# Patient Record
Sex: FEMALE | Race: WHITE | NOT HISPANIC OR LATINO | Employment: FULL TIME
[De-identification: names, ages, dates, MRNs, and addresses within clinical notes are randomized per-mention and may not be internally consistent; named-entity substitution may affect disease eponyms.]

---

## 2017-04-04 DIAGNOSIS — Z12.39 SCREENING FOR BREAST CANCER: Primary | ICD-10-CM

## 2017-07-01 ENCOUNTER — HEALTH MAINTENANCE LETTER (OUTPATIENT)
Age: 57
End: 2017-07-01

## 2017-10-24 ENCOUNTER — OFFICE VISIT (OUTPATIENT)
Dept: FAMILY MEDICINE | Facility: CLINIC | Age: 57
End: 2017-10-24

## 2017-10-24 VITALS
HEART RATE: 62 BPM | SYSTOLIC BLOOD PRESSURE: 140 MMHG | WEIGHT: 222 LBS | TEMPERATURE: 98.2 F | BODY MASS INDEX: 36.6 KG/M2 | DIASTOLIC BLOOD PRESSURE: 80 MMHG | OXYGEN SATURATION: 97 %

## 2017-10-24 DIAGNOSIS — R03.0 ELEVATED BLOOD PRESSURE READING WITHOUT DIAGNOSIS OF HYPERTENSION: ICD-10-CM

## 2017-10-24 DIAGNOSIS — R53.83 FATIGUE, UNSPECIFIED TYPE: Primary | ICD-10-CM

## 2017-10-24 DIAGNOSIS — Z12.31 ENCOUNTER FOR SCREENING MAMMOGRAM FOR BREAST CANCER: ICD-10-CM

## 2017-10-24 DIAGNOSIS — Z13.220 LIPID SCREENING: ICD-10-CM

## 2017-10-24 DIAGNOSIS — Z13.1 SCREENING FOR DIABETES MELLITUS: ICD-10-CM

## 2017-10-24 DIAGNOSIS — J06.9 VIRAL UPPER RESPIRATORY TRACT INFECTION: ICD-10-CM

## 2017-10-24 LAB
ANION GAP SERPL CALCULATED.3IONS-SCNC: 6 MMOL/L (ref 3–14)
BASOPHILS # BLD AUTO: 0 10E9/L (ref 0–0.2)
BASOPHILS NFR BLD AUTO: 0.5 %
BUN SERPL-MCNC: 17 MG/DL (ref 7–30)
CALCIUM SERPL-MCNC: 9.6 MG/DL (ref 8.5–10.1)
CHLORIDE SERPL-SCNC: 104 MMOL/L (ref 94–109)
CHOLEST SERPL-MCNC: 259 MG/DL
CO2 SERPL-SCNC: 28 MMOL/L (ref 20–32)
CREAT SERPL-MCNC: 0.9 MG/DL (ref 0.52–1.04)
DIFFERENTIAL METHOD BLD: ABNORMAL
EOSINOPHIL # BLD AUTO: 0.3 10E9/L (ref 0–0.7)
EOSINOPHIL NFR BLD AUTO: 3.4 %
ERYTHROCYTE [DISTWIDTH] IN BLOOD BY AUTOMATED COUNT: 14.5 % (ref 10–15)
GFR SERPL CREATININE-BSD FRML MDRD: 65 ML/MIN/1.7M2
GLUCOSE SERPL-MCNC: 97 MG/DL (ref 70–99)
HBA1C MFR BLD: 5.6 % (ref 4.1–5.7)
HCT VFR BLD AUTO: 43.4 % (ref 35–47)
HDLC SERPL-MCNC: 59 MG/DL
HGB BLD-MCNC: 14.2 G/DL (ref 11.7–15.7)
IMM GRANULOCYTES # BLD: 0 10E9/L (ref 0–0.4)
IMM GRANULOCYTES NFR BLD: 0.1 %
LDLC SERPL CALC-MCNC: 178 MG/DL
LYMPHOCYTES # BLD AUTO: 1.5 10E9/L (ref 0.8–5.3)
LYMPHOCYTES NFR BLD AUTO: 20.1 %
MCH RBC QN AUTO: 27.2 PG (ref 26.5–33)
MCHC RBC AUTO-ENTMCNC: 32.7 G/DL (ref 31.5–36.5)
MCV RBC AUTO: 83 FL (ref 78–100)
MONOCYTES # BLD AUTO: 0.4 10E9/L (ref 0–1.3)
MONOCYTES NFR BLD AUTO: 5.5 %
MONONUCLEOSIS SCREEN: NEGATIVE
NEUTROPHILS # BLD AUTO: 5.2 10E9/L (ref 1.6–8.3)
NEUTROPHILS NFR BLD AUTO: 70.4 %
NONHDLC SERPL-MCNC: 200 MG/DL
NRBC # BLD AUTO: 0 10*3/UL
NRBC BLD AUTO-RTO: 0 /100
PLATELET # BLD AUTO: 291 10E9/L (ref 150–450)
POTASSIUM SERPL-SCNC: 4.9 MMOL/L (ref 3.4–5.3)
RBC # BLD AUTO: 5.23 10E12/L (ref 3.8–5.2)
SODIUM SERPL-SCNC: 139 MMOL/L (ref 133–144)
TRIGL SERPL-MCNC: 110 MG/DL
TSH SERPL DL<=0.005 MIU/L-ACNC: 1.64 MU/L (ref 0.4–4)
WBC # BLD AUTO: 7.4 10E9/L (ref 4–11)

## 2017-10-24 ASSESSMENT — PAIN SCALES - GENERAL: PAINLEVEL: NO PAIN (0)

## 2017-10-24 NOTE — NURSING NOTE
Chief Complaint   Patient presents with     URI     Nasal Congestion     Breast Pain     left breast x 2 weeks, goes down under her arm     57 year old      Blood pressure 140/80, pulse 62, temperature 98.2  F (36.8  C), temperature source Oral, weight 222 lb (100.7 kg), last menstrual period 02/01/2015, SpO2 97 %, not currently breastfeeding. Body mass index is 36.6 kg/(m^2).    Wt Readings from Last 2 Encounters:   10/24/17 222 lb (100.7 kg)   03/29/16 194 lb (88 kg)     BP Readings from Last 3 Encounters:   10/24/17 140/80   03/29/16 127/87   05/13/15 128/80       Patient Active Problem List   Diagnosis     Allergic rhinitis       Current Outpatient Prescriptions   Medication Sig Dispense Refill     fexofenadine (ALLEGRA) 180 MG tablet Take 1 tablet by mouth daily.       Glucosamine-Chondroitin (GLUCOSAMINE CHONDR COMPLEX PO) Take 1 capsule by mouth 2 times daily.       Multiple Vitamin (MULTIVITAMIN) per tablet Take 1 tablet by mouth daily.         Social History   Substance Use Topics     Smoking status: Never Smoker     Smokeless tobacco: Never Used     Alcohol use No         Health Maintenance Due   Topic Date Due     HEPATITIS C SCREENING  04/28/1979     MAMMO SCREEN Q2 YR (SYSTEM ASSIGNED)  04/14/2016     ADVANCE DIRECTIVE PLANNING Q5 YRS  04/28/2016     PAP Q5 YEARS  06/19/2017     HPV Q5 YEARS (Complete with PAP)  06/19/2017     INFLUENZA VACCINE (SYSTEM ASSIGNED)  09/01/2017       JIGNESH QUINONEZ CMA  October 24, 2017 8:39 AM

## 2017-10-24 NOTE — PATIENT INSTRUCTIONS
Labs today to evaluate your recent infection and fatigue.    Your mono test is negative    Use flonase for allergy symptoms.  I do not think you have a sinus infection at this time and will recommend we avoid an antibiotic.    Please make an appointment for your physical.    Please let me know if you have any questions.  Thank you for allowing me to participate in your care.  It was my pleasure meeting you.  Nancy Kasper PA-C      Welcome to [facility], where we are committed to the art of inspired primary care.  Thank you for choosing us to be a part of your well-being.    The clinic is open Monday through Friday, 8:00 a.m. - 5:00 p.m., and Saturdays from 8:00 a.m. - 12:00 p.m.  After-hours questions are directed to our 24-hour nurse line, which can be reached by calling the clinic at [phone].  You can also contact the clinic through [employer], our online patient portal.  (Please allow 1-2 business days for a response via [employer].)  If you are not already enrolled in [employer], access instructions are below.    If you need a refill on your prescription, please contact the pharmacy where you filled it, and they will contact our clinic with the details of what is needed.  If your prescription is a controlled substance, you will have a conversation with your provider to determine if you would like to  your prescription at the clinic or have it mailed to your pharmacy.  Please allow 2-3 business days for all refill requests to be handled.    We look forward to providing you with great care!

## 2017-10-24 NOTE — MR AVS SNAPSHOT
After Visit Summary   10/24/2017    Yola Amin    MRN: [id number]           Patient Information     Date Of Birth          1960        Visit Information        Provider Department      10/24/2017 8:40 AM Falguni Kasper PA-C [facility]        Today's Diagnoses     Fatigue, unspecified type    -  1    Screening for diabetes mellitus        Lipid screening        Elevated blood pressure reading without diagnosis of hypertension        Encounter for screening mammogram for breast cancer          Care Instructions    Labs today to evaluate your recent infection and fatigue.    Your mono test is negative    Use flonase for allergy symptoms.  I do not think you have a sinus infection at this time and will recommend we avoid an antibiotic.    Please make an appointment for your physical.    Please let me know if you have any questions.  Thank you for allowing me to participate in your care.  It was my pleasure meeting you.  Nancy Kasper PA-C      Welcome to [facility], where we are committed to the art of inspired primary care.  Thank you for choosing us to be a part of your well-being.    The clinic is open Monday through Friday, 8:00 a.m. - 5:00 p.m., and Saturdays from 8:00 a.m. - 12:00 p.m.  After-hours questions are directed to our 24-hour nurse line, which can be reached by calling the clinic at [phone].  You can also contact the clinic through [email or web address], our online patient portal.  (Please allow 1-2 business days for a response via [email or web address].)  If you are not already enrolled in [email or web address], access instructions are below.    If you need a refill on your prescription, please contact the pharmacy where you filled it, and they will contact our clinic with the details of what is needed.  If your prescription is a controlled substance, you will have a conversation with your provider to determine if you would like to  your prescription at the  clinic or have it mailed to your pharmacy.  Please allow 2-3 business days for all refill requests to be handled.    We look forward to providing you with great care!          Follow-ups after your visit        Future tests that were ordered for you today     Open Future Orders        Priority Expected Expires Ordered    Mammogram - routine screening Routine  10/24/2018 10/24/2017            Who to contact     Please call your clinic at [phone] to:    Ask questions about your health    Make or cancel appointments    Discuss your medicines    Learn about your test results    Speak to your doctor   If you have compliments or concerns about an experience at your clinic, or if you wish to file a complaint, please contact [facility] Patient Relations at [phone] or email us at [email or web address]         Additional Information About Your Visit        [employer] Information     [employer] gives you secure access to your electronic health record. If you see a primary care provider, you can also send messages to your care team and make appointments. If you have questions, please call your primary care clinic.  If you do not have a primary care provider, please call [phone] and they will assist you.      [employer] is an electronic gateway that provides easy, online access to your medical records. With [employer], you can request a clinic appointment, read your test results, renew a prescription or communicate with your care team.     To access your existing account, please contact your [facility] or call [phone] for assistance.        Care EveryWhere ID     This is your Care EveryWhere ID. This could be used by other organizations to access your [facility] medical records  [id number]        Your Vitals Were     Pulse Temperature Last Period Pulse Oximetry BMI (Body Mass Index)       62 98.2  F (36.8  C) (Oral) 02/01/2015 97% 36.6 kg/m2        Blood  Pressure from Last 3 Encounters:   10/24/17 140/80   03/29/16 127/87   05/13/15 128/80    Weight from Last 3 Encounters:   10/24/17 222 lb (100.7 kg)   03/29/16 194 lb (88 kg)   05/13/15 196 lb 1.3 oz (88.9 kg)              We Performed the Following     Basic metabolic panel     CBC with platelets differential     Hemoglobin A1c ([place])     Mononucleosis Screen ([facility])     TSH with free T4 reflex     Vitamin D deficiency screening        Primary Care Provider Office Phone # Fax #    [facility] [phone]       [address], [address]  [place] [address]        Equal Access to Services     ALFRED WILLIS : Celsa Majano, fran parra, gale gallardo, jerod stein. So [facility] [phone].    ATENCIÓN: Si habla español, tiene a augustine disposición servicios gratuitos de asistencia lingüística. Llame al [phone].    We comply with applicable federal civil rights laws and [place] laws. We do not discriminate on the basis of race, color, national origin, age, disability, sex, sexual orientation, or gender identity.            Thank you!     Thank you for choosing [facility]  for your care. Our goal is always to provide you with excellent care. Hearing back from our patients is one way we can continue to improve our services. Please take a few minutes to complete the written survey that you may receive in the mail after your visit with us. Thank you!             Your Updated Medication List - Protect others around you: Learn how to safely use, store and throw away your medicines at www.disposemymeds.org.          This list is accurate as of: 10/24/17  9:30 AM.  Always use your most recent med list.                   Brand Name Dispense Instructions for use Diagnosis    fexofenadine 180 MG tablet    ALLEGRA     Take 1 tablet by mouth daily.        GLUCOSAMINE CHONDR COMPLEX PO      Take 1 capsule by mouth 2 times daily.         multivitamin per tablet      Take 1 tablet by mouth daily.

## 2017-10-24 NOTE — PROGRESS NOTES
SUBJECTIVE:   Yola Amin is a 57 year old female who presents to clinic today for the following health issues:    Acute Illness   Acute illness concerns: Cough cold and congestion started couple weeks at least.  Feels fatigued and is concerned about the possibility of mono.  Has not checked fever but has felt feverish.  Minimal sore throat.  Her sister currently has mono.  Has a history of allergies and they have been bothering for the same amount of time.  She denies facial pain and pressure.  No cough.     Fever: no     Chills/Sweats: YES    Headache (location?): YES    Sinus Pressure:YES    Conjunctivitis:  no    Ear Pain: no    Rhinorrhea: YES    Congestion: YES    Sore Throat: YES- minimal     Cough: no    Wheeze: no    Decreased Appetite: no    Nausea: no    Vomiting: no    Diarrhea:  no    Dysuria/Freq.: no    Fatigue/Achiness: YES    Sick/Strep Exposure: no     Therapies Tried and outcome: Taking allergy meds/antihistamine    Multiple other concerns:    Pain under the left upper arm that radiates into the left lateral chest and breast. Symptoms have been present for the past couple months. Started as just a fleeting  And now she notes it most of the time. She denies an associated history of trauma.    Fatigue: has been present for the past several months.  Patient admits to being under a great deal of stress with her work and her mom's illness and recent death.  She denies depression but has had issues with this in the past.She admits to neglecting her physical/health issues due to these stresses but now she is ready to work on them.  She is getting a normal amount of sleep and denies sleep disturbances.      Problem list and histories reviewed & adjusted, as indicated.  Additional history: as documented    Patient Active Problem List   Diagnosis     Allergic rhinitis     Elevated blood pressure reading without diagnosis of hypertension     Past Surgical History:   Procedure Laterality Date      CYSTECTOMY OVARIAN BENIGN      left     ENDOCERVICAL CURETTAGE         Social History   Substance Use Topics     Smoking status: Never Smoker     Smokeless tobacco: Never Used     Alcohol use No     Family History   Problem Relation Age of Onset     DIABETES Mother      Hypertension Mother      Rheumatologic Disease Mother      CANCER Maternal Grandmother      Liver Disease Maternal Grandmother      CANCER Paternal Grandmother          Current Outpatient Prescriptions   Medication Sig Dispense Refill     fexofenadine (ALLEGRA) 180 MG tablet Take 1 tablet by mouth daily.       Glucosamine-Chondroitin (GLUCOSAMINE CHONDR COMPLEX PO) Take 1 capsule by mouth 2 times daily.       Multiple Vitamin (MULTIVITAMIN) per tablet Take 1 tablet by mouth daily.       No Known Allergies      Reviewed and updated as needed this visit by clinical staffTobacco  Allergies  Meds  Problems       Reviewed and updated as needed this visit by Provider  Allergies  Meds  Problems         ROS:  Constitutional, HEENT, cardiovascular, pulmonary, gi and gu systems are negative, except as otherwise noted.      OBJECTIVE:   /80 (BP Location: Right arm, Patient Position: Sitting, Cuff Size: Adult Large)  Pulse 62  Temp 98.2  F (36.8  C) (Oral)  Wt 222 lb (100.7 kg)  LMP 02/01/2015  SpO2 97%  BMI 36.6 kg/m2  Body mass index is 36.6 kg/(m^2).  GENERAL: healthy, alert and no distress  EYES: Eyes grossly normal to inspection, PERRL and conjunctivae and sclerae normal  HENT: ear canals and TM's normal, nose and mouth without ulcers or lesions  NECK: no adenopathy, no asymmetry, masses, or scars and thyroid normal to palpation  RESP: lungs clear to auscultation - no rales, rhonchi or wheezes  BREAST: normal without masses, tenderness or nipple discharge and no palpable axillary masses or adenopathy  CV: regular rate and rhythm, normal S1 S2, no S3 or S4, no murmur, click or rub, no peripheral edema and peripheral pulses strong  ABDOMEN:  soft, nontender, no hepatosplenomegaly, no masses and bowel sounds normal  SKIN: no suspicious lesions or rashes  PSYCH: well dressed and groomed.  Good eye contact and is cooperative. Thoughts linear.  No delusions, compulsions or paranoia.  Affect flat.  Patient denies homicidal and suicidal ideation as well as no thoughts or actions of self-harm.  LYMPH: no cervical, supraclavicular, axillary, or inguinal adenopathy    Diagnostic Test Results:  Results for orders placed or performed in visit on 10/24/17 (from the past 24 hour(s))   Mononucleosis Screen ([facility])   Result Value Ref Range    Mononucleosis Screen NEGATIVE    Hemoglobin A1c ([place])   Result Value Ref Range    Hemoglobin A1C 5.6 4.1 - 5.7 %       ASSESSMENT/PLAN:      Diagnosis Comments   1. Fatigue, unspecified type  CBC with platelets differential, TSH with free T4 reflex, Vitamin D deficiency screening, Mononucleosis Screen ([facility])   Unclear etiology.  Suspect post viral   2. Viral upper respiratory tract infection  Continue with symptomatic treatment.  Avoid antibiotics   3. Elevated blood pressure reading without diagnosis of hypertension  Hemoglobin A1c ([place]), Basic metabolic panel   Make appointment for CPE and recheck   4. Screening for diabetes mellitus  Hemoglobin A1c ([place])    5. Lipid screening  Lipid test   6. Encounter for screening mammogram for breast cancer  Mammogram - routine screening      See patient instructions.  Consider CPE in the near future.  Discussed causes of fatigue.  Consider taking vitamin D daily.      Patient Instructions   Labs today to evaluate your recent infection and fatigue.    Your mono test is negative    Use flonase for allergy symptoms.  I do not think you have a sinus infection at this time and will recommend we avoid an antibiotic.    Please make an appointment for your physical.        Falguni Kasper PA-C  [facility]

## 2017-10-25 LAB — DEPRECATED CALCIDIOL+CALCIFEROL SERPL-MC: 36 UG/L (ref 20–75)

## 2017-10-28 ENCOUNTER — HEALTH MAINTENANCE LETTER (OUTPATIENT)
Age: 57
End: 2017-10-28

## 2017-12-11 NOTE — PROGRESS NOTES
SUBJECTIVE:   CC: Yola Amin is an 57 year old woman who presents for preventive health visit. Her last CPE was 2015 with JAMES Trejo CNP. She has the following concerns:  None.  Overall doing well.  She has been exercising and lost 6 pounds since her last visit. Heart healthy diet.  We will plan to recheck lipids in 6 months.  Future orders placed.    GYN history:  Currently sexually active: yes with wife    Contraception: Not needed/menopausal.  Patient's last menstrual period was 2015.  Vaginal symptoms: None  Concern for STD: no    Accepts/requests STD testing: declines  Last PAP and HPV 2012    Breast concerns:  Back and shoulder pain.  Has been seeing chiropractor for this and is concerned it may be related to her breast size and she would like to talk to someone about breast reduction.    She describes skin irritation and yeast infection under her breasts. No rash currently.  Has tried multiple kinds of bras.  Two sisters have had breast reduction.      Healthy Habits:    Do you get at least three servings of calcium containing foods daily (dairy, green leafy vegetables, etc.)? yes    Amount of exercise or daily activities, outside of work: 45 minutes on the treadmill 5 days a week    Problems taking medications regularly No    Medication side effects: No    Have you had an eye exam in the past two years? yes    Do you see a dentist twice per year? yes    Do you have sleep apnea, excessive snoring or daytime drowsiness?no        Hyperlipidemia Follow-Up    Recent Labs   Lab Test  10/24/17   0923  05/13/15   1037  14   1111   CHOL  259*  199  206.0*   HDL  59  73  62.0   LDL  178*  114  122.0   TRIG  110  61  112.0   CHOLHDLRATIO   --   2.7  3.3     The 10-year ASCVD risk score ([place] THEODORA Jr, et al., 2013) is: 2.8%    Values used to calculate the score:      Age: 57 years      Sex: Female      Is Non- : No      Diabetic: No      Tobacco smoker: No       Systolic Blood Pressure: 132 mmHg      Is BP treated: No      HDL Cholesterol: 59 mg/dL      Total Cholesterol: 259 mg/dL  BP Readings from Last 6 Encounters:   12/21/17 132/86   10/24/17 140/80   03/29/16 127/87   05/13/15 128/80   04/01/14 118/81   05/15/13 126/86     Wt Readings from Last 2 Encounters:   12/21/17 216 lb 4 oz (98.1 kg)   10/24/17 222 lb (100.7 kg)       Rate your low fat/cholesterol diet?: good    Taking statin?  No    Other lipid medications/supplements?:  Fish oil/Omega 3,  without side effects      Today's PHQ-2 Score:   PHQ-2 ( 1999 Pfizer) 12/21/2017 3/29/2016   Q1: Little interest or pleasure in doing things 0 0   Q2: Feeling down, depressed or hopeless 0 0   PHQ-2 Score 0 0       Patient Active Problem List    Diagnosis Date Noted     Elevated blood pressure reading without diagnosis of hypertension 10/24/2017     Priority: Medium     Allergic rhinitis 08/17/2012     Priority: Medium     Problem list name updated by automated process. Provider to review         History reviewed. No pertinent past medical history.    Past Surgical History:   Procedure Laterality Date     CYSTECTOMY OVARIAN BENIGN      left     ENDOCERVICAL CURETTAGE         Family History   Problem Relation Age of Onset     DIABETES Mother      Hypertension Mother      Rheumatologic Disease Mother      CANCER Maternal Grandmother      Liver Disease Maternal Grandmother      CANCER Paternal Grandmother        Social History   Substance Use Topics     Smoking status: Never Smoker     Smokeless tobacco: Never Used     Alcohol use 1.8 oz/week     3 Standard drinks or equivalent per week       Social History     Social History Narrative    Work is going well.  Lives with wife.  Two cats.        Has a good support system.    Feels safe in all environments.    Wears seatbelt 100% of the time    Wears helmet while biking.    Denies history of abuse, past or present, physical, sexual or emotional.    12/21/17    Nancy Kasper PA-C                Current Outpatient Prescriptions   Medication Sig Dispense Refill     fexofenadine (ALLEGRA) 180 MG tablet Take 1 tablet by mouth daily.       Glucosamine-Chondroitin (GLUCOSAMINE CHONDR COMPLEX PO) Take 1 capsule by mouth 2 times daily.       Multiple Vitamin (MULTIVITAMIN) per tablet Take 1 tablet by mouth daily.           Reviewed orders with patient.  Reviewed health maintenance and updated orders accordingly - Yes  BP Readings from Last 3 Encounters:   12/21/17 132/86   10/24/17 140/80   03/29/16 127/87    Wt Readings from Last 3 Encounters:   12/21/17 216 lb 4 oz (98.1 kg)   10/24/17 222 lb (100.7 kg)   03/29/16 194 lb (88 kg)                      Patient over age 50, mutual decision to screen reflected in health maintenance.      Pertinent mammograms are reviewed under the imaging tab.  History of abnormal Pap smear: NO - age 30-65 PAP every 5 years with negative HPV co-testing recommended    Reviewed and updated as needed this visit by clinical staffTobacco  Allergies  Meds  Med Hx  Surg Hx  Fam Hx  Soc Hx        Reviewed and updated as needed this visit by Provider  Tobacco  Med Hx  Surg Hx  Fam Hx  Soc Hx         ROS:  C: NEGATIVE for fever, chills, change in weight  I: NEGATIVE for worrisome rashes, moles or lesions  E: NEGATIVE for vision changes or irritation  ENT: NEGATIVE for ear, mouth and throat problems  R: NEGATIVE for significant cough or SOB  B: NEGATIVE for masses, tenderness or discharge  CV: NEGATIVE for chest pain, palpitations or peripheral edema  GI: NEGATIVE for nausea, abdominal pain, heartburn, or change in bowel habits  : NEGATIVE for unusual urinary or vaginal symptoms. No vaginal bleeding.  M: NEGATIVE for significant arthralgias or myalgia  N: NEGATIVE for weakness, dizziness or paresthesias  E: NEGATIVE for temperature intolerance, skin/hair changes  H: NEGATIVE for bleeding problems  P: NEGATIVE for changes in mood or affect     OBJECTIVE:   /86  Pulse  "66  Temp 97.5  F (36.4  C) (Oral)  Ht 5' 6.69\" (169.4 cm)  Wt 216 lb 4 oz (98.1 kg)  LMP 02/01/2015  SpO2 96%  BMI 34.18 kg/m2  EXAM:  GENERAL: healthy, alert and no distress  EYES: Eyes grossly normal to inspection, PERRL and conjunctivae and sclerae normal  HENT: ear canals and TM's normal, nose and mouth without ulcers or lesions  NECK: no adenopathy, no asymmetry, masses, or scars and thyroid normal to palpation  RESP: lungs clear to auscultation - no rales, rhonchi or wheezes  BREAST: normal without masses, tenderness or nipple discharge and no palpable axillary masses or adenopathy  CV: regular rate and rhythm, normal S1 S2, no S3 or S4, no murmur, click or rub, no peripheral edema and peripheral pulses strong  ABDOMEN: soft, nontender, no hepatosplenomegaly, no masses and bowel sounds normal   (female): normal female external genitalia, normal urethral meatus, vaginal mucosa pink, moist, well rugated, and normal cervix/adnexa/uterus without masses or discharge  MS: no gross musculoskeletal defects noted, no edema  SKIN: no suspicious lesions or rashes  NEURO: Normal strength and tone, mentation intact and speech normal  PSYCH: well dressed and groomed.  Good eye contact and is cooperative. Thoughts linear.  No delusions, compulsions or paranoia.  Affect flat.  Patient denies homicidal and suicidal ideation as well as no thoughts or actions of self-harm.  LYMPH: no cervical, supraclavicular, axillary, or inguinal adenopathy    ASSESSMENT/PLAN:       ICD-10-CM    1. Routine general medical examination at a health care facility Z00.00 Pap imaged thin layer screen with HPV - recommended age 30 - 65     HPV High Risk Types DNA Cervical     Hepatitis C antibody   2. Need for hepatitis C screening test Z11.59 Hepatitis C antibody   3. Cervical cancer screening Z12.4 Pap imaged thin layer screen with HPV - recommended age 30 - 65     HPV High Risk Types DNA Cervical   4. Hyperlipidemia LDL goal <160 E78.5 " "Lipid Panel Plus ([place])   5. Pendulous breast N64.89 BREAST CENTER REFERRAL   6. Chronic neck and back pain M54.2 BREAST CENTER REFERRAL    M54.9        COUNSELING:   Reviewed preventive health counseling, as reflected in patient instructions  Special attention given to:        Regular exercise       Healthy diet/nutrition       Vision screening       Hearing screening       Immunizations    Reviewed and are up to date.         Osteoporosis Prevention/Bone Health       Colon cancer screening       Consider Hep C screening for patients born between 1945 and 1965       ([place])menopause management       Advance Care Planning     reports that she has never smoked. She has never used smokeless tobacco.    Estimated body mass index is 34.18 kg/(m^2) as calculated from the following:    Height as of this encounter: 5' 6.69\" (169.4 cm).    Weight as of this encounter: 216 lb 4 oz (98.1 kg).   Weight management plan: Discussed healthy diet and exercise guidelines and patient will follow up in 12 months in clinic to re-evaluate.    Counseling Resources:  ATP IV Guidelines  Pooled Cohorts Equation Calculator  Breast Cancer Risk Calculator  FRAX Risk Assessment  ICSI Preventive Guidelines  Dietary Guidelines for Americans, 2010  USDA's MyPlate  ASA Prophylaxis  Lung CA Screening    Falguni Kasper PA-C  [facility]  "

## 2017-12-21 ENCOUNTER — OFFICE VISIT (OUTPATIENT)
Dept: FAMILY MEDICINE | Facility: CLINIC | Age: 57
End: 2017-12-21
Payer: COMMERCIAL

## 2017-12-21 VITALS
DIASTOLIC BLOOD PRESSURE: 86 MMHG | HEART RATE: 66 BPM | SYSTOLIC BLOOD PRESSURE: 132 MMHG | BODY MASS INDEX: 33.94 KG/M2 | OXYGEN SATURATION: 96 % | TEMPERATURE: 97.5 F | HEIGHT: 67 IN | WEIGHT: 216.25 LBS

## 2017-12-21 DIAGNOSIS — E78.5 HYPERLIPIDEMIA LDL GOAL <160: ICD-10-CM

## 2017-12-21 DIAGNOSIS — G89.29 CHRONIC NECK AND BACK PAIN: ICD-10-CM

## 2017-12-21 DIAGNOSIS — Z12.4 CERVICAL CANCER SCREENING: ICD-10-CM

## 2017-12-21 DIAGNOSIS — N64.89 PENDULOUS BREAST: ICD-10-CM

## 2017-12-21 DIAGNOSIS — M54.2 CHRONIC NECK AND BACK PAIN: ICD-10-CM

## 2017-12-21 DIAGNOSIS — Z11.59 NEED FOR HEPATITIS C SCREENING TEST: ICD-10-CM

## 2017-12-21 DIAGNOSIS — Z00.00 ROUTINE GENERAL MEDICAL EXAMINATION AT A HEALTH CARE FACILITY: Primary | ICD-10-CM

## 2017-12-21 DIAGNOSIS — M54.9 CHRONIC NECK AND BACK PAIN: ICD-10-CM

## 2017-12-21 NOTE — MR AVS SNAPSHOT
After Visit Summary   12/21/2017    Yola Amin    MRN: [id number]           Patient Information     Date Of Birth          1960        Visit Information        Provider Department      12/21/2017 10:00 AM Falguni Kasper PA-C [facility]        Today's Diagnoses     Routine general medical examination at a health care facility    -  1    Need for hepatitis C screening test        Cervical cancer screening        Hyperlipidemia LDL goal <160          Care Instructions      Preventive Health Recommendations  Female Ages 50 - 64    Yearly exam: See your health care provider every year in order to  o Review health changes.   o Discuss preventive care.    o Review your medicines if your doctor has prescribed any.      Get a Pap test every three years (unless you have an abnormal result and your provider advises testing more often).    If you get Pap tests with HPV test, you only need to test every 5 years, unless you have an abnormal result.     You do not need a Pap test if your uterus was removed (hysterectomy) and you have not had cancer.    You should be tested each year for STDs (sexually transmitted diseases) if you're at risk.     Have a mammogram every 1 to 2 years.    Have a colonoscopy at age 50, or have a yearly FIT test (stool test). These exams screen for colon cancer.      Have a cholesterol test every 5 years, or more often if advised.    Have a diabetes test (fasting glucose) every three years. If you are at risk for diabetes, you should have this test more often.     If you are at risk for osteoporosis (brittle bone disease), think about having a bone density scan (DEXA).    Shots: Get a flu shot each year. Get a tetanus shot every 10 years.    Nutrition:     Eat at least 5 servings of fruits and vegetables each day.    Eat whole-grain bread, whole-wheat pasta and brown rice instead of white grains and rice.    Talk to your provider about Calcium and Vitamin D.      Lifestyle    Exercise at least 150 minutes a week (30 minutes a day, 5 days a week). This will help you control your weight and prevent disease.    Limit alcohol to one drink per day.    No smoking.     Wear sunscreen to prevent skin cancer.     See your dentist every six months for an exam and cleaning.    See your eye doctor every 1 to 2 years.            Follow-ups after your visit        Future tests that were ordered for you today     Open Future Orders        Priority Expected Expires Ordered    Hepatitis C antibody Routine 6/21/2018 12/21/2018 12/21/2017    Lipid Panel Plus ([place]) Routine 6/21/2018 12/21/2018 12/21/2017            Who to contact     Please call your clinic at [phone] to:    Ask questions about your health    Make or cancel appointments    Discuss your medicines    Learn about your test results    Speak to your doctor   If you have compliments or concerns about an experience at your clinic, or if you wish to file a complaint, please contact [facility] Patient Relations at [phone] or email us at [email or web address]         Additional Information About Your Visit        Catacelhart Information     [employer] gives you secure access to your electronic health record. If you see a primary care provider, you can also send messages to your care team and make appointments. If you have questions, please call your primary care clinic.  If you do not have a primary care provider, please call [phone] and they will assist you.      [employer] is an electronic gateway that provides easy, online access to your medical records. With [employer], you can request a clinic appointment, read your test results, renew a prescription or communicate with your care team.     To access your existing account, please contact your [facility] or call [phone] for assistance.        Care EveryWhere ID     This is your Care EveryWhere ID.  "This could be used by other organizations to access your [facility] medical records  [id number]        Your Vitals Were     Pulse Temperature Height Last Period Pulse Oximetry BMI (Body Mass Index)    66 97.5  F (36.4  C) (Oral) 5' 6.69\" (169.4 cm) 02/01/2015 96% 34.18 kg/m2       Blood Pressure from Last 3 Encounters:   12/21/17 132/86   10/24/17 140/80   03/29/16 127/87    Weight from Last 3 Encounters:   12/21/17 216 lb 4 oz (98.1 kg)   10/24/17 222 lb (100.7 kg)   03/29/16 194 lb (88 kg)              We Performed the Following     HPV High Risk Types DNA Cervical     Pap imaged thin layer screen with HPV - recommended age 30 - 65        Primary Care Provider Office Phone # Fax #    Falguni Kasper PA-C [phone]       0 [address] [place] [address]        Equal Access to Services     ALFRED WILLIS : Hadii aad ku hadasho Soomaali, waaxda luqadaha, qaybta kaalmada adeegyada, waxay lylain rere ren . So [facility] [phone].    ATENCIÓN: Si habla español, tiene a augustine disposición servicios gratuitos de asistencia lingüística. [facility] [phone].    We comply with applicable federal civil rights laws and [place] laws. We do not discriminate on the basis of race, color, national origin, age, disability, sex, sexual orientation, or gender identity.            Thank you!     Thank you for choosing [facility]  for your care. Our goal is always to provide you with excellent care. Hearing back from our patients is one way we can continue to improve our services. Please take a few minutes to complete the written survey that you may receive in the mail after your visit with us. Thank you!             Your Updated Medication List - Protect others around you: Learn how to safely use, store and throw away your medicines at www.disposemymeds.org.          This list is accurate as of: 12/21/17 10:57 AM.  Always use your most recent med list.                   Brand Name " Dispense Instructions for use Diagnosis    fexofenadine 180 MG tablet    ALLEGRA     Take 1 tablet by mouth daily.        GLUCOSAMINE CHONDR COMPLEX PO      Take 1 capsule by mouth 2 times daily.        multivitamin per tablet      Take 1 tablet by mouth daily.

## 2017-12-21 NOTE — NURSING NOTE
"57 year old  Chief Complaint   Patient presents with     Physical     with pap test     Blood Draw     hep C testing     Patient Request     pt wants to discuss the possibility of having a breast reduction       Blood pressure 132/86, pulse 66, temperature 97.5  F (36.4  C), temperature source Oral, height 5' 6.69\" (169.4 cm), weight 216 lb 4 oz (98.1 kg), last menstrual period 02/01/2015, SpO2 96 %, not currently breastfeeding. Body mass index is 34.18 kg/(m^2).  Patient Active Problem List   Diagnosis     Allergic rhinitis     Elevated blood pressure reading without diagnosis of hypertension       Wt Readings from Last 2 Encounters:   12/21/17 216 lb 4 oz (98.1 kg)   10/24/17 222 lb (100.7 kg)     BP Readings from Last 3 Encounters:   12/21/17 132/86   10/24/17 140/80   03/29/16 127/87         Current Outpatient Prescriptions   Medication     fexofenadine (ALLEGRA) 180 MG tablet     Glucosamine-Chondroitin (GLUCOSAMINE CHONDR COMPLEX PO)     Multiple Vitamin (MULTIVITAMIN) per tablet     No current facility-administered medications for this visit.        Social History   Substance Use Topics     Smoking status: Never Smoker     Smokeless tobacco: Never Used     Alcohol use No       Health Maintenance Due   Topic Date Due     HEPATITIS C SCREENING  04/28/1979     ADVANCE DIRECTIVE PLANNING Q5 YRS  04/28/2016     PAP Q5 YEARS  06/19/2017     HPV Q5 YEARS (Complete with PAP)  06/19/2017       Lab Results   Component Value Date    PAP NIL 06/19/2012 December 21, 2017 10:00 AM  "

## 2017-12-26 LAB
COPATH REPORT: NORMAL
PAP: NORMAL

## 2017-12-27 LAB
FINAL DIAGNOSIS: NORMAL
HPV HR 12 DNA CVX QL NAA+PROBE: NEGATIVE
HPV16 DNA SPEC QL NAA+PROBE: NEGATIVE
HPV18 DNA SPEC QL NAA+PROBE: NEGATIVE
SPECIMEN DESCRIPTION: NORMAL

## 2018-02-03 ASSESSMENT — ENCOUNTER SYMPTOMS
BREAST MASS: 0
JOINT SWELLING: 0
MYALGIAS: 1
NECK PAIN: 1
ARTHRALGIAS: 0
MUSCLE CRAMPS: 0
STIFFNESS: 0
BREAST PAIN: 1
BACK PAIN: 1
MUSCLE WEAKNESS: 0

## 2018-02-06 ENCOUNTER — OFFICE VISIT (OUTPATIENT)
Dept: PLASTIC SURGERY | Facility: CLINIC | Age: 58
End: 2018-02-06
Attending: PLASTIC SURGERY
Payer: COMMERCIAL

## 2018-02-06 VITALS
SYSTOLIC BLOOD PRESSURE: 123 MMHG | WEIGHT: 209.9 LBS | BODY MASS INDEX: 33.73 KG/M2 | HEIGHT: 66 IN | OXYGEN SATURATION: 96 % | TEMPERATURE: 97.4 F | DIASTOLIC BLOOD PRESSURE: 81 MMHG | HEART RATE: 71 BPM

## 2018-02-06 DIAGNOSIS — M54.9 CHRONIC NECK AND BACK PAIN: ICD-10-CM

## 2018-02-06 DIAGNOSIS — G89.29 CHRONIC NECK AND BACK PAIN: ICD-10-CM

## 2018-02-06 DIAGNOSIS — N62 HYPERTROPHY OF BREAST: Primary | ICD-10-CM

## 2018-02-06 DIAGNOSIS — M54.2 CHRONIC NECK AND BACK PAIN: ICD-10-CM

## 2018-02-06 DIAGNOSIS — N64.89 PENDULOUS BREAST: ICD-10-CM

## 2018-02-06 PROCEDURE — G0463 HOSPITAL OUTPT CLINIC VISIT: HCPCS | Mod: ZF

## 2018-02-06 ASSESSMENT — PAIN SCALES - GENERAL: PAINLEVEL: MILD PAIN (3)

## 2018-02-06 NOTE — LETTER
2/6/2018       RE: Yola Amin  [address]    [facility] [address]     Dear Colleague,    Thank you for referring your patient, Yola Amin, to the [facility] at [facility]. Please see a copy of my visit note below.    REQUESTING PHYSICIAN:  Falguni Kasper PA-C.       PRESENTING COMPLAINT:  Consultation for breast reduction.      HISTORY OF PRESENTING COMPLAINT:  Ms. Amin is 57 years old.  She is here to discuss possible breast reduction surgery.  She has a lot of upper back, neck and shoulder pain, shoulder grooving from the bra strap and inframammary fold rashes in the summers.  She sees a massage therapist, a chiropractor and has been taking over-the-counter pain medication many years without continued relief.  She wears a DD to DDD bra.  She would like to be around a B cup.  She had a mammogram in 10/2017 that was benign, BI-RADS 2.  She has no personal history of breast cancer and no family history of breast cancer.      PAST MEDICAL HISTORY:  Nil.      PAST SURGICAL HISTORY:  Endocervical curettage and benign cyst removal from the ovary left side.      MEDICATIONS:  None.      ALLERGIES:  None.      SOCIAL HISTORY:  Does not smoke, socially drinks.  Works as a psychologist.      REVIEW OF SYSTEMS:  Denies chest pain, shortness of breath, MI, CVA, DVT and PE.      PHYSICAL EXAMINATION:  Vital signs are stable.  She is afebrile, in no obvious distress.  She is 5 feet 6-1/2 inches, 216 pounds, BMI of 34 kg/m2.  Body surface area 2.05 m2.  On examination of her breasts, she has grade 2 on 3 ptosis.  Left breast is about 5% larger than the right side.  Sternal notch to nipple distance under 40 cm.  No palpable masses, no axillary or cervical lymphadenopathy.      ASSESSMENT AND PLAN:  Based on the above findings, a diagnosis of symptomatic bilateral breast hypertrophy was made.  I had a long discussion with the patient and her  significant other about breast reduction surgery.  Explained to them what that would entail and showed them where the scars would be.  I was very clear that based on her Schnur scale, 687 grams needs to be removed from each breast and that if I took that amount off she would be at least 90% smaller than she currently is which means she would be disproportionately small for her body habitus and may require a free nipple graft.  She was okay with that.  Plan is to get prior authorization and then proceed as indicated.  Went over all the risks, benefits and alternatives including pain, infection, bleeding, scarring, asymmetry, seromas, hematomas, wound breakdown, wound dehiscence, loss of sensation in the nipple and the breast, prominent scar, hypertrophic scar, keloid scar, asymmetry of the breasts, seromas, hematomas, fat necrosis, skin necrosis, T-junction site necrosis, DVT, PE, MI, CVA, pneumonia, renal failure and death.  Consent was obtained and photographs obtained.  All exam was done in the presence of my nurse.  She was happy with the visit.  I look forward to helping her out in the near future.      Total time spent with the patient was 30 minutes, more than half was counseling.        Again, thank you for allowing me to participate in the care of your patient.      Sincerely,    AKIRA Vizcarra MD    cc:   Falguni Kasper PA-C   [facility]   [address] [place] [address]  [id number]

## 2018-02-06 NOTE — NURSING NOTE
"Oncology Rooming Note    February 6, 2018 7:40 AM   Yola Amin is a 57 year old female who presents for:    Chief Complaint   Patient presents with     Oncology Clinic Visit     New Patient-Breast Reduction     Initial Vitals: /81 (BP Location: Right arm, Patient Position: Sitting, Cuff Size: Adult Large)  Pulse 71  Temp 97.4  F (36.3  C) (Oral)  Ht 1.676 m (5' 6\")  Wt 95.2 kg (209 lb 14.4 oz)  LMP 02/01/2015  SpO2 96%  BMI 33.88 kg/m2 Estimated body mass index is 33.88 kg/(m^2) as calculated from the following:    Height as of this encounter: 1.676 m (5' 6\").    Weight as of this encounter: 95.2 kg (209 lb 14.4 oz). Body surface area is 2.11 meters squared.  Mild Pain (3) Comment: Both Shoulder-Left Worse   Patient's last menstrual period was 02/01/2015.  Allergies reviewed: Yes  Medications reviewed: Yes    Medications: Medication refills not needed today.  Pharmacy name entered into [employer]: [id number] - [address]    Clinical concerns: Questions Dr. Vizcarra was notified.    10 minutes for nursing intake (face to face time)     Denise Samayoa LPN              "

## 2018-02-06 NOTE — PROGRESS NOTES
REQUESTING PHYSICIAN:  Falguni Kasper PA-C.       PRESENTING COMPLAINT:  Consultation for breast reduction.      HISTORY OF PRESENTING COMPLAINT:  Ms. Amin is 57 years old.  She is here to discuss possible breast reduction surgery.  She has a lot of upper back, neck and shoulder pain, shoulder grooving from the bra strap and inframammary fold rashes in the summers.  She sees a massage therapist, a chiropractor and has been taking over-the-counter pain medication many years without continued relief.  She wears a DD to DDD bra.  She would like to be around a B cup.  She had a mammogram in 10/2017 that was benign, BI-RADS 2.  She has no personal history of breast cancer and no family history of breast cancer.      PAST MEDICAL HISTORY:  Nil.      PAST SURGICAL HISTORY:  Endocervical curettage and benign cyst removal from the ovary left side.      MEDICATIONS:  None.      ALLERGIES:  None.      SOCIAL HISTORY:  Does not smoke, socially drinks.  Works as a psychologist.      REVIEW OF SYSTEMS:  Denies chest pain, shortness of breath, MI, CVA, DVT and PE.      PHYSICAL EXAMINATION:  Vital signs are stable.  She is afebrile, in no obvious distress.  She is 5 feet 6-1/2 inches, 216 pounds, BMI of 34 kg/m2.  Body surface area 2.05 m2.  On examination of her breasts, she has grade 2 on 3 ptosis.  Left breast is about 5% larger than the right side.  Sternal notch to nipple distance under 40 cm.  No palpable masses, no axillary or cervical lymphadenopathy.      ASSESSMENT AND PLAN:  Based on the above findings, a diagnosis of symptomatic bilateral breast hypertrophy was made.  I had a long discussion with the patient and her significant other about breast reduction surgery.  Explained to them what that would entail and showed them where the scars would be.  I was very clear that based on her Schnur scale, 687 grams needs to be removed from each breast and that if I took that amount off she would be at least 90% smaller  than she currently is which means she would be disproportionately small for her body habitus and may require a free nipple graft.  She was okay with that.  Plan is to get prior authorization and then proceed as indicated.  Went over all the risks, benefits and alternatives including pain, infection, bleeding, scarring, asymmetry, seromas, hematomas, wound breakdown, wound dehiscence, loss of sensation in the nipple and the breast, prominent scar, hypertrophic scar, keloid scar, asymmetry of the breasts, seromas, hematomas, fat necrosis, skin necrosis, T-junction site necrosis, DVT, PE, MI, CVA, pneumonia, renal failure and death.  Consent was obtained and photographs obtained.  All exam was done in the presence of my nurse.  She was happy with the visit.  I look forward to helping her out in the near future.      Total time spent with the patient was 30 minutes, more than half was counseling.      cc:   Falguni Kasper PA-C   [facility]   [address] [facility], [place]  [address]

## 2018-02-06 NOTE — MR AVS SNAPSHOT
"              After Visit Summary   2/6/2018    Yola Amin    MRN: [id number]           Patient Information     Date Of Birth          1960        Visit Information        Provider Department      2/6/2018 8:00 AM AKIRA Vizcarra MD [facility]        Today's Diagnoses     Hypertrophy of breast    -  1    Pendulous breast        Chronic neck and back pain           Follow-ups after your visit        Follow-up notes from your care team     Return if symptoms worsen or fail to improve.      Who to contact     If you have questions or need follow up information about today's clinic visit or your schedule please contact [facility] directly at [phone].  Normal or non-critical lab and imaging results will be communicated to you by MyChart, letter or phone within 4 business days after the clinic has received the results. If you do not hear from us within 7 days, please contact the clinic through [employer] or phone. If you have a critical or abnormal lab result, we will notify you by phone as soon as possible.  Submit refill requests through [employer] or call your pharmacy and they will forward the refill request to us. Please allow 3 business days for your refill to be completed.          Additional Information About Your Visit        MyChart Information     [employer] gives you secure access to your electronic health record. If you see a primary care provider, you can also send messages to your care team and make appointments. If you have questions, please call your primary care clinic.  If you do not have a primary care provider, please call [phone] and they will assist you.        Care EveryWhere ID     This is your Care EveryWhere ID. This could be used by other organizations to access your [facility] medical records  [id number]        Your Vitals Were     Pulse Temperature Height Last Period Pulse Oximetry BMI (Body Mass Index)    71 97.4  F (36.3  C) (Oral) 5' 6\" 02/01/2015 " 96% 33.88 kg/m2       Blood Pressure from Last 3 Encounters:   02/06/18 123/81   12/21/17 132/86   10/24/17 140/80    Weight from Last 3 Encounters:   02/06/18 209 lb 14.4 oz   12/21/17 216 lb 4 oz   10/24/17 222 lb              Today, you had the following     No orders found for display       Primary Care Provider Office Phone # Fax #    Falguni Kasper PA-C [phone]       [address] [place] [address]        Equal Access to Services     [facility]: Hadii tae bruce hadasho Sorebecca, waaxda luqadaha, qaybta kaalmada sami, jerod ren . So [facility] [phone].    ATENCIÓN: Si habla español, tiene a augustine disposición servicios gratuitos de asistencia lingüística. [facility] [phone].    We comply with applicable federal civil rights laws and [place] laws. We do not discriminate on the basis of race, color, national origin, age, disability, sex, sexual orientation, or gender identity.            Thank you!     Thank you for choosing [facility]  for your care. Our goal is always to provide you with excellent care. Hearing back from our patients is one way we can continue to improve our services. Please take a few minutes to complete the written survey that you may receive in the mail after your visit with us. Thank you!             Your Updated Medication List - Protect others around you: Learn how to safely use, store and throw away your medicines at www.disposemymeds.org.          This list is accurate as of 2/6/18 11:02 AM.  Always use your most recent med list.                   Brand Name Dispense Instructions for use Diagnosis    fexofenadine 180 MG tablet    ALLEGRA     Take 1 tablet by mouth daily.        GLUCOSAMINE CHONDR COMPLEX PO      Take 1 capsule by mouth 2 times daily.        IBUPROFEN PO      Take 200 mg by mouth every 4 hours as needed for moderate pain        multivitamin per tablet      Take 1 tablet by mouth daily.

## 2018-02-12 ENCOUNTER — TELEPHONE (OUTPATIENT)
Dept: SURGERY | Facility: CLINIC | Age: 58
End: 2018-02-12

## 2018-02-12 DIAGNOSIS — N62 HYPERTROPHY OF BREAST: Primary | ICD-10-CM

## 2018-02-14 ENCOUNTER — TELEPHONE (OUTPATIENT)
Dept: SURGERY | Facility: CLINIC | Age: 58
End: 2018-02-14

## 2018-02-22 ENCOUNTER — TELEPHONE (OUTPATIENT)
Dept: SURGERY | Facility: CLINIC | Age: 58
End: 2018-02-22

## 2018-02-22 NOTE — TELEPHONE ENCOUNTER
Received fax from Kevin Zambrano -request for more documentation.  I tried calling him, but he is out of office.  So faxed him notes from ov 10/24/17 with Dr Kasper, no photograghs were taken

## 2018-02-23 ENCOUNTER — MEDICAL CORRESPONDENCE (OUTPATIENT)
Dept: HEALTH INFORMATION MANAGEMENT | Facility: CLINIC | Age: 58
End: 2018-02-23

## 2018-02-26 NOTE — PROGRESS NOTES
[facility]  [address]  [place] [facility] [place] [place] [address]  [phone]  Dept: [phone]    PRE-OP EVALUATION:  Today's date: 3/8/2018    Yola Amin (: 1960) presents for pre-operative evaluation assessment as requested by Dr. AKIRA Vizcarra.  She requires evaluation and anesthesia risk assessment prior to undergoing surgery/procedure for treatment of Mammoplasty Reduction (Bilateral) .    Proposed Surgery/ Procedure:Mammoplasty Reduction (Bilateral)  Date of Surgery/ Procedure: 3/22/2018  Time of Surgery/ Procedure: 9:40 AM  [place] [facility] [place] Physician: Falguni Kasper  Type of Anesthesia Anticipated: Combined general with block    Patient has a Health Care Directive or Living Will:  YES     1. NO - Do you have a history of heart attack, stroke, stent, bypass or surgery on an artery in the head, neck, heart or legs?  2. NO - Do you ever have any pain or discomfort in your chest?  3. NO - Do you have a history of  Heart Failure?  4. NO - Are you troubled by shortness of breath when: walking on the level, up a slight hill or at night?  5. NO - Do you currently have a cold, bronchitis or other respiratory infection?  6. NO - Do you have a cough, shortness of breath or wheezing?  7. NO - Do you sometimes get pains in the calves of your legs when you walk?  8. NO - Do you or anyone in your family have previous history of blood clots?  9. NO - Do you or does anyone in your family have a serious bleeding problem such as prolonged bleeding following surgeries or cuts?  10. NO - Have you ever had problems with anemia or been told to take iron pills?  11. NO - Have you had any abnormal blood loss such as black, tarry or bloody stools, or abnormal vaginal bleeding?  12. NO - Have you ever had a blood transfusion?  13. NO - Have you or any of your relatives ever had problems with anesthesia?  14. NO - Do you have sleep apnea, excessive  snoring or daytime drowsiness?  15. NO - Do you have any prosthetic heart valves?  16. NO - Do you have prosthetic joints?  17. NO - Is there any chance that you may be pregnant?      HPI:     HPI related to upcoming procedure: Long history of chronic neck and back pain with pendulous breast with persistent local skin irritation under her breasts.      HYPERLIPIDEMIA - Patient has a history of Hyperlipidemia requiring medication for treatment with recent good control. Patient has been taking fish oil and working on life style modifications. No other cardiac risk factors.                                                                                                                                                    .    MEDICAL HISTORY:     Patient Active Problem List    Diagnosis Date Noted     Chronic neck and back pain 03/08/2018     Priority: Medium     Pendulous breast 03/08/2018     Priority: Medium     Hypertrophy of breast 02/06/2018     Priority: Medium     Elevated blood pressure reading without diagnosis of hypertension 10/24/2017     Priority: Medium     Allergic rhinitis 08/17/2012     Priority: Medium     Problem list name updated by automated process. Provider to review        History reviewed. No pertinent past medical history.  Past Surgical History:   Procedure Laterality Date     bone marrow donation  1988     CYSTECTOMY OVARIAN BENIGN      left     ENDOCERVICAL CURETTAGE       Current Outpatient Prescriptions   Medication Sig Dispense Refill     IBUPROFEN PO Take 200 mg by mouth every 4 hours as needed for moderate pain       fexofenadine (ALLEGRA) 180 MG tablet Take 1 tablet by mouth daily.       Glucosamine-Chondroitin (GLUCOSAMINE CHONDR COMPLEX PO) Take 1 capsule by mouth daily        Multiple Vitamin (MULTIVITAMIN) per tablet Take 1 tablet by mouth daily.       OTC products: Advised to avoid fish oil,ibuprofen and aleve for one week prior to surgery.    No Known Allergies   Latex Allergy:  "NO    Social History   Substance Use Topics     Smoking status: Never Smoker     Smokeless tobacco: Never Used     Alcohol use 1.8 oz/week     3 Standard drinks or equivalent per week     History   Drug Use No       REVIEW OF SYSTEMS:   CONSTITUTIONAL: NEGATIVE for fever, chills, change in weight  INTEGUMENTARY/SKIN: NEGATIVE for worrisome rashes, moles or lesions  EYES: NEGATIVE for vision changes or irritation  ENT/MOUTH: NEGATIVE for ear, mouth and throat problems  RESP: NEGATIVE for significant cough or SOB  CV: NEGATIVE for chest pain, palpitations or peripheral edema  GI: NEGATIVE for nausea, abdominal pain, heartburn, or change in bowel habits  : NEGATIVE for frequency, dysuria, or hematuria  MUSCULOSKELETAL: NEGATIVE for significant arthralgias or myalgia  NEURO: NEGATIVE for weakness, dizziness or paresthesias  ENDOCRINE: NEGATIVE for temperature intolerance, skin/hair changes  HEME: NEGATIVE for bleeding problems  PSYCHIATRIC: NEGATIVE for changes in mood or affect    EXAM:   /77  Pulse 60  Temp 98  F (36.7  C) (Oral)  Ht 5' 5.98\" (167.6 cm)  Wt 205 lb 8 oz (93.2 kg)  LMP 02/01/2015  SpO2 96%  BMI 33.18 kg/m2    GENERAL APPEARANCE: healthy, alert and no distress     EYES: EOMI, PERRL     HENT: ear canals and TM's normal and nose and mouth without ulcers or lesions     NECK: no adenopathy, no asymmetry, masses, or scars and thyroid normal to palpation     RESP: lungs clear to auscultation - no rales, rhonchi or wheezes     CV: regular rates and rhythm, normal S1 S2, no S3 or S4 and no murmur, click or rub     ABDOMEN:  soft, nontender, no HSM or masses and bowel sounds normal     MS: extremities normal- no gross deformities noted, no evidence of inflammation in joints, FROM in all extremities.     SKIN: no suspicious lesions or rashes     NEURO: Normal strength and tone, sensory exam grossly normal, mentation intact and speech normal     PSYCH: mentation appears normal. and affect " normal/bright     LYMPHATICS: No cervical adenopathy    DIAGNOSTICS:   EKG: Not indicated due to non-vascular surgery and low risk of event (age <65 and without cardiac risk factors)    Recent Labs   Lab Test  10/24/17   0923  10/24/17   0854   HGB  14.2   --    PLT  291   --    NA  139   --    POTASSIUM  4.9   --    CR  0.90   --    A1C   --   5.6        IMPRESSION:   Reason for surgery/procedure:     ICD-10-CM    1. Preop general physical exam Z01.818    2. Hypertrophy of breast N62    3. Pendulous breast N64.89    4. Chronic neck and back pain M54.2     M54.9        Diagnosis/reason for consult: Preopevaluation    The proposed surgical procedure is considered INTERMEDIATE risk.    REVISED CARDIAC RISK INDEX  The patient has the following serious cardiovascular risks for perioperative complications such as (MI, PE, VFib and 3  AV Block):  No serious cardiac risks  INTERPRETATION: 0 risks: Class I (very low risk - 0.4% complication rate)    The patient has the following additional risks for perioperative complications:  No identified additional risks        RECOMMENDATIONS:     --Patient is on no chronic medications    APPROVAL GIVEN to proceed with proposed procedure, without further diagnostic evaluation   Patient advised to bring her advance directives with her for scanning to EMR.  See patient instructions  Signed Electronically by: Falguni Kasper PA-C    Copy of this evaluation report is provided to requesting physician.    Ana M Preop Guidelines

## 2018-02-26 NOTE — PATIENT INSTRUCTIONS
Before Your Surgery    Call your surgeon if there is any change in your health. This includes signs of a cold or flu (such as a sore throat, runny nose, cough, rash or fever).    Do not smoke, drink alcohol or take over the counter medicine (unless your surgeon or primary care doctor tells you to) for the 24 hours before and after surgery.    If you take prescribed drugs: Follow your doctor s orders about which medicines to take and which to stop until after surgery.    Eating and drinking prior to surgery: follow the instructions from your surgeon    Take a shower or bath the night before surgery. Use the soap your surgeon gave you to gently clean your skin. If you do not have soap from your surgeon, use your regular soap. Do not shave or scrub the surgery site.  Wear clean pajamas and have clean sheets on your bed.

## 2018-02-26 NOTE — PROGRESS NOTES
"  [facility]  [address]  [place] [facility] [address]  [phone]  Dept: [phone]    PRE-OP EVALUATION:  Today's date: 3/8/2018    Yola Amin (: 1960) presents for pre-operative evaluation assessment as requested by  ***.  She requires evaluation and anesthesia risk assessment prior to undergoing surgery/procedure for treatment of *** .    {PREOP QUESTIONNAIRE OPTIONS (by MA):232869}    HPI:     HPI related to upcoming procedure: ***      {Ch. Problems:961348}    MEDICAL HISTORY:     Patient Active Problem List    Diagnosis Date Noted     Hypertrophy of breast 2018     Priority: Medium     Elevated blood pressure reading without diagnosis of hypertension 10/24/2017     Priority: Medium     Allergic rhinitis 2012     Priority: Medium     Problem list name updated by automated process. Provider to review        No past medical history on file.  Past Surgical History:   Procedure Laterality Date     CYSTECTOMY OVARIAN BENIGN      left     ENDOCERVICAL CURETTAGE       Current Outpatient Prescriptions   Medication Sig Dispense Refill     IBUPROFEN PO Take 200 mg by mouth every 4 hours as needed for moderate pain       fexofenadine (ALLEGRA) 180 MG tablet Take 1 tablet by mouth daily.       Glucosamine-Chondroitin (GLUCOSAMINE CHONDR COMPLEX PO) Take 1 capsule by mouth 2 times daily.       Multiple Vitamin (MULTIVITAMIN) per tablet Take 1 tablet by mouth daily.       OTC products: {OTC ANALGESICS:698923}    No Known Allergies   Latex Allergy: {YES/NO WITH DEFAULT:636868::\"NO\"}    Social History   Substance Use Topics     Smoking status: Never Smoker     Smokeless tobacco: Never Used     Alcohol use 1.8 oz/week     3 Standard drinks or equivalent per week     History   Drug Use No       REVIEW OF SYSTEMS:   {ROS Preop Choices:130404}    EXAM:   LMP 2015  {EXAM Preop Choices:876871}    DIAGNOSTICS:   {DIAGNOSTIC FOR PREOP:468475}    Recent " "Labs   Lab Test  10/24/17   0923  10/24/17   0854   HGB  14.2   --    PLT  291   --    NA  139   --    POTASSIUM  4.9   --    CR  0.90   --    A1C   --   5.6        IMPRESSION:   {PREOP REASONS:041793::\"Reason for surgery/procedure: ***\",\"Diagnosis/reason for consult: ***\"}    The proposed surgical procedure is considered {HIGH=major cardiovascular or procedures requiring prolonged anesthesia >4 hours or large fluid shifts;    INTERMEDIATE=abdominal, most orthopedic and intrathoracic surgery; LOW= endoscopy, cataract and breast surgery:035757} risk.    REVISED CARDIAC RISK INDEX  The patient has the following serious cardiovascular risks for perioperative complications such as (MI, PE, VFib and 3  AV Block):  {PREOP REVISED CARDIAC INDEX (RCI):534206:p:\"No serious cardiac risks\"}  INTERPRETATION: {REVISED CARDIAC RISK INTERPRETATION:382619}    The patient has the following additional risks for perioperative complications:  {Additional perioperative risks:277959:p:\"No identified additional risks\"}      ICD-10-CM    1. Preop general physical exam Z01.818        RECOMMENDATIONS:     {IMPORTANT - Conditions - complete carefully!!:836209}    {IMPORTANT - Medications:821257::\"--Patient is to take all scheduled medications on the day of surgery EXCEPT for modifications listed below.\"}    {IMPORTANT - Approval:444679:p:\"APPROVAL GIVEN to proceed with proposed procedure, without further diagnostic evaluation\"}       Signed Electronically by: Falguni Kasper PA-C    Copy of this evaluation report is provided to requesting physicianMary Ann Viramontes Preop Guidelines  "

## 2018-02-28 ENCOUNTER — TELEPHONE (OUTPATIENT)
Dept: SURGERY | Facility: CLINIC | Age: 58
End: 2018-02-28

## 2018-02-28 NOTE — TELEPHONE ENCOUNTER
I called and talked to [email or web address] today, she will request denial letter be faxed to me. Usually they take up to 10 days to receive, I should receive this one within 24 hrs.  Breast reduction surgery is an exclusion under patient's policy, if I appeal, I need that denial letter.  Patient has asked the other day, if Dr Vizcarra could write a letter that might help.  She also faxed me a letter from chiropractor visit, which I will have scanned to HIM.  If I do appeal, I am being told these can take up to 30 days to review.  Patient does want to keep her 3/22/18 surgery date.  Sent email to Bhupinder to call and give her a quote.  He replied stating he would.  Staff message sent to care team

## 2018-03-02 ENCOUNTER — TELEPHONE (OUTPATIENT)
Dept: SURGERY | Facility: CLINIC | Age: 58
End: 2018-03-02

## 2018-03-03 ASSESSMENT — ENCOUNTER SYMPTOMS
PARALYSIS: 0
TREMORS: 0
STIFFNESS: 0
MEMORY LOSS: 0
DISTURBANCES IN COORDINATION: 0
NUMBNESS: 0
MUSCLE WEAKNESS: 0
ARTHRALGIAS: 0
HEADACHES: 1
JOINT SWELLING: 0
SPEECH CHANGE: 0
MUSCLE CRAMPS: 1
BACK PAIN: 1
WEAKNESS: 0
MYALGIAS: 1
NECK PAIN: 1
SEIZURES: 0
TINGLING: 0
LOSS OF CONSCIOUSNESS: 0
DIZZINESS: 0

## 2018-03-06 ENCOUNTER — TELEPHONE (OUTPATIENT)
Dept: SURGERY | Facility: CLINIC | Age: 58
End: 2018-03-06

## 2018-03-06 ENCOUNTER — OFFICE VISIT (OUTPATIENT)
Dept: PLASTIC SURGERY | Facility: CLINIC | Age: 58
End: 2018-03-06
Attending: PLASTIC SURGERY
Payer: COMMERCIAL

## 2018-03-06 VITALS
BODY MASS INDEX: 33.23 KG/M2 | HEART RATE: 84 BPM | TEMPERATURE: 97.3 F | WEIGHT: 206.8 LBS | OXYGEN SATURATION: 94 % | HEIGHT: 66 IN | DIASTOLIC BLOOD PRESSURE: 79 MMHG | SYSTOLIC BLOOD PRESSURE: 125 MMHG | RESPIRATION RATE: 18 BRPM

## 2018-03-06 DIAGNOSIS — N62 HYPERTROPHY OF BREAST: Primary | ICD-10-CM

## 2018-03-06 PROCEDURE — G0463 HOSPITAL OUTPT CLINIC VISIT: HCPCS | Mod: ZF

## 2018-03-06 ASSESSMENT — PAIN SCALES - GENERAL: PAINLEVEL: MODERATE PAIN (5)

## 2018-03-06 NOTE — LETTER
3/6/2018       RE: Yola Amin  [address]    [facility] [address]     Dear Colleague,    Thank you for referring your patient, Yola Amin, to the [facility] at [facility]. Please see a copy of my visit note below.    PRESENTING COMPLAINT:  Preoperative visit for upcoming bilateral breast reduction scheduled for 03/22/2018.      HISTORY OF PRESENTING COMPLAINT:  Ms. Amin is 57 years old.  She is scheduled to undergo a bilateral breast reduction.  We are still in the process of working with her insurance company to see if they will cover this.  She has an exclusion to the breast reduction procedure and therefore an appeal has been sent but we have not heard back.  The patient, regardless of the insurance, wants to proceed on 03/22/2018 and will pay out-of-pocket if necessary.  Otherwise, no change in her history and physical exam.      ASSESSMENT AND PLAN:  Based on above findings, a diagnosis of symptomatic bilateral breast hypertrophy was made.  I had a long discussion with the patient about the planned procedure.  If insurance pays for it, our plan is to get at least 687 grams from each side which would mean she will be at least 90% smaller and may require free nipple graft.  If she pays out-of-pocket, the patient wants to be about a B cup size and of course wants to try and prevent a free nipple graft.  This was explained in detail and we discussed this in detail.  All risks, benefits and alternatives of the potential procedures including pain, infection, bleeding, scarring, asymmetry, seromas, hematomas, wound breakdown, wound dehiscence, loss of sensation in the nipple and breast, prominent scar, hypertrophic scar, keloid scar, asymmetry of the breasts, seromas, hematomas, fat necrosis, skin necrosis, T-junction site necrosis, DVT, PE, MI, CVA, pneumonia, renal failure and death were all explained.  She understood everything  and wants to proceed.  I look forward to helping her out in the near future.  We will send her quotes for the procedure.      Total time spent with patient was 15 minutes, more than half was counseling.         Again, thank you for allowing me to participate in the care of your patient.      Sincerely,    AKIRA Vizcarra MD

## 2018-03-06 NOTE — TELEPHONE ENCOUNTER
Left voice message with patient per staff message from dr Vizcarra, see what patient would like to do at this point, not sure if denial for prior authorization will be overturned

## 2018-03-06 NOTE — TELEPHONE ENCOUNTER
I did call on this appeal, insurance states they will know for sure by 4/1/18, but the appeal is on file and in review, I can keep calling back,   So will let you know

## 2018-03-06 NOTE — NURSING NOTE
"Oncology Rooming Note    March 6, 2018 7:36 AM   Yola Amin is a 57 year old female who presents for:    Chief Complaint   Patient presents with     Oncology Clinic Visit     Pre-Op for 3/22 Surgery.     Initial Vitals: /79  Pulse 84  Temp 97.3  F (36.3  C) (Oral)  Resp 18  Ht 1.676 m (5' 5.98\")  Wt 93.8 kg (206 lb 12.8 oz)  LMP 02/01/2015  SpO2 94%  BMI 33.39 kg/m2 Estimated body mass index is 33.39 kg/(m^2) as calculated from the following:    Height as of this encounter: 1.676 m (5' 5.98\").    Weight as of this encounter: 93.8 kg (206 lb 12.8 oz). Body surface area is 2.09 meters squared.  Moderate Pain (5) Comment: left neck   Patient's last menstrual period was 02/01/2015.  Allergies reviewed: Yes  Medications reviewed: Yes    Medications: Medication refills not needed today.  Pharmacy name entered into [facility]: [id number] - [address]    Clinical concerns: None Dr Vizcarra was NOT notified.    7 minutes for nursing intake (face to face time)     Barbara Patino LPN              "

## 2018-03-06 NOTE — MR AVS SNAPSHOT
After Visit Summary   3/6/2018    Yola Amin    MRN: [id number]           Patient Information     Date Of Birth          1960        Visit Information        Provider Department      3/6/2018 7:45 AM AKIRA Vizcarra MD [facility]        Today's Diagnoses     Hypertrophy of breast    -  1       Follow-ups after your visit        Follow-up notes from your care team     Return if symptoms worsen or fail to improve.      Your next 10 appointments already scheduled     Mar 08, 2018  9:20 AM CST   PRE-OP with Falguni Kasper PA-C   [facility] ([facility])    [address], [address]  [facility] [address]   [phone]            Mar 22, 2018   Procedure with AKIRA Vizcarra MD   [facility] ([facility])    [address] [place] [address]  5th Floor  [facility] [address] [phone]           Located in the Clinics and Surgery Center at [address] [place] [address], [place] [address].   parking is very convenient and highly recommended.  is a $6 flat rate fee.  Both  and self parkers should enter the main arrival plaza from [facility]; parking attendants will direct you based on your parking preference.            Apr 03, 2018  8:00 AM CDT   (Arrive by 7:45 AM)   Post-Op with AKIRA Vizcarra MD   [facility] ([facility])    [address] [place] [address]  [address]  [facility] [address] [phone]              Who to contact     If you have questions or need follow up information about today's clinic visit or your schedule please contact [facility] directly at [phone].  Normal or non-critical lab and imaging results will be communicated to you by MyChart, letter or phone within 4 business days after the clinic has received the results. If you do not hear from us within 7 days, please contact the clinic  "through [employer] or phone. If you have a critical or abnormal lab result, we will notify you by phone as soon as possible.  Submit refill requests through [employer] or call your pharmacy and they will forward the refill request to us. Please allow 3 business days for your refill to be completed.          Additional Information About Your Visit        [employer] Information     [employer] gives you secure access to your electronic health record. If you see a primary care provider, you can also send messages to your care team and make appointments. If you have questions, please call your primary care clinic.  If you do not have a primary care provider, please call [phone] and they will assist you.        Care EveryWhere ID     This is your Care EveryWhere ID. This could be used by other organizations to access your [facility] medical records  [id number]        Your Vitals Were     Pulse Temperature Respirations Height Last Period Pulse Oximetry    84 97.3  F (36.3  C) (Oral) 18 5' 5.98\" 02/01/2015 94%    BMI (Body Mass Index)                   33.39 kg/m2            Blood Pressure from Last 3 Encounters:   03/06/18 125/79   02/06/18 123/81   12/21/17 132/86    Weight from Last 3 Encounters:   03/06/18 206 lb 12.8 oz   02/06/18 209 lb 14.4 oz   12/21/17 216 lb 4 oz              Today, you had the following     No orders found for display       Primary Care Provider Office Phone # Fax #    Falguni Kasper PA-C [phone]       [address] [place] [address]        Equal Access to Services     ALFRED WILLIS : Hadii aad ku hadasho Soomaali, waaxda luqadaha, qaybta kaalmada adeegyada, jerod ren . So [facility] [phone].    ATENCIÓN: Si habla español, tiene a augustine disposición servicios gratuitos de asistencia lingüística. Jayson al [phone].    We comply with applicable federal civil rights laws and [place] laws. We do not discriminate on the basis of race, color, " national origin, age, disability, sex, sexual orientation, or gender identity.            Thank you!     Thank you for choosing [facility]  for your care. Our goal is always to provide you with excellent care. Hearing back from our patients is one way we can continue to improve our services. Please take a few minutes to complete the written survey that you may receive in the mail after your visit with us. Thank you!             Your Updated Medication List - Protect others around you: Learn how to safely use, store and throw away your medicines at www.disposemymeds.org.          This list is accurate as of 3/6/18  1:31 PM.  Always use your most recent med list.                   Brand Name Dispense Instructions for use Diagnosis    fexofenadine 180 MG tablet    ALLEGRA     Take 1 tablet by mouth daily.        GLUCOSAMINE CHONDR COMPLEX PO      Take 1 capsule by mouth daily        IBUPROFEN PO      Take 200 mg by mouth every 4 hours as needed for moderate pain        multivitamin per tablet      Take 1 tablet by mouth daily.

## 2018-03-06 NOTE — PROGRESS NOTES
PRESENTING COMPLAINT:  Preoperative visit for upcoming bilateral breast reduction scheduled for 03/22/2018.      HISTORY OF PRESENTING COMPLAINT:  Ms. Amin is 57 years old.  She is scheduled to undergo a bilateral breast reduction.  We are still in the process of working with her insurance company to see if they will cover this.  She has an exclusion to the breast reduction procedure and therefore an appeal has been sent but we have not heard back.  The patient, regardless of the insurance, wants to proceed on 03/22/2018 and will pay out-of-pocket if necessary.  Otherwise, no change in her history and physical exam.      ASSESSMENT AND PLAN:  Based on above findings, a diagnosis of symptomatic bilateral breast hypertrophy was made.  I had a long discussion with the patient about the planned procedure.  If insurance pays for it, our plan is to get at least 687 grams from each side which would mean she will be at least 90% smaller and may require free nipple graft.  If she pays out-of-pocket, the patient wants to be about a B cup size and of course wants to try and prevent a free nipple graft.  This was explained in detail and we discussed this in detail.  All risks, benefits and alternatives of the potential procedures including pain, infection, bleeding, scarring, asymmetry, seromas, hematomas, wound breakdown, wound dehiscence, loss of sensation in the nipple and breast, prominent scar, hypertrophic scar, keloid scar, asymmetry of the breasts, seromas, hematomas, fat necrosis, skin necrosis, T-junction site necrosis, DVT, PE, MI, CVA, pneumonia, renal failure and death were all explained.  She understood everything and wants to proceed.  I look forward to helping her out in the near future.  We will send her quotes for the procedure.      Total time spent with patient was 15 minutes, more than half was counseling.

## 2018-03-07 ENCOUNTER — TELEPHONE (OUTPATIENT)
Dept: SURGERY | Facility: CLINIC | Age: 58
End: 2018-03-07

## 2018-03-07 NOTE — TELEPHONE ENCOUNTER
Received phone call from patient Tuesday before I left for day.  She would like to wait as long as possible to see if the appeal is overturned and approved.  Insurance is unable to give me any status, other than it can take up to 4/1/18 to finish reviewing.  Will send staff messge to Dr Vizcarra

## 2018-03-08 ENCOUNTER — OFFICE VISIT (OUTPATIENT)
Dept: FAMILY MEDICINE | Facility: CLINIC | Age: 58
End: 2018-03-08
Payer: COMMERCIAL

## 2018-03-08 VITALS
HEART RATE: 60 BPM | BODY MASS INDEX: 33.03 KG/M2 | OXYGEN SATURATION: 96 % | TEMPERATURE: 98 F | HEIGHT: 66 IN | DIASTOLIC BLOOD PRESSURE: 77 MMHG | WEIGHT: 205.5 LBS | SYSTOLIC BLOOD PRESSURE: 112 MMHG

## 2018-03-08 DIAGNOSIS — N62 HYPERTROPHY OF BREAST: ICD-10-CM

## 2018-03-08 DIAGNOSIS — N64.89 PENDULOUS BREAST: ICD-10-CM

## 2018-03-08 DIAGNOSIS — M54.2 CHRONIC NECK AND BACK PAIN: ICD-10-CM

## 2018-03-08 DIAGNOSIS — E78.5 HYPERLIPIDEMIA LDL GOAL <160: ICD-10-CM

## 2018-03-08 DIAGNOSIS — Z01.818 PREOP GENERAL PHYSICAL EXAM: Primary | ICD-10-CM

## 2018-03-08 DIAGNOSIS — M54.9 CHRONIC NECK AND BACK PAIN: ICD-10-CM

## 2018-03-08 DIAGNOSIS — Z11.59 NEED FOR HEPATITIS C SCREENING TEST: ICD-10-CM

## 2018-03-08 DIAGNOSIS — G89.29 CHRONIC NECK AND BACK PAIN: ICD-10-CM

## 2018-03-08 LAB
ALT SERPL-CCNC: 23 U/L (ref 0–50)
AST SERPL-CCNC: 25 U/L (ref 0–45)
CHOLEST SERPL-MCNC: 198 MG/DL (ref 0–200)
CHOLEST/HDLC SERPL: 3.4 {RATIO} (ref 0–5)
FASTING SPECIMEN: YES
GLUCOSE SERPL-MCNC: 103 MG/DL (ref 60–109)
HCV AB SERPL QL IA: NONREACTIVE
HDLC SERPL-MCNC: 59 MG/DL
LDLC SERPL CALC-MCNC: 122 MG/DL (ref 0–129)
TRIGL SERPL-MCNC: 86 MG/DL (ref 0–150)
VLDL-CHOLESTEROL: 17 (ref 7–32)

## 2018-03-08 NOTE — NURSING NOTE
"57 year old  Chief Complaint   Patient presents with     Pre-Op Exam     Breast Reduction     Patient Request     pt is requesting a Hep C check and also her chlesterol she came fasting       Blood pressure 112/77, pulse 60, temperature 98  F (36.7  C), temperature source Oral, height 5' 5.98\" (167.6 cm), weight 205 lb 8 oz (93.2 kg), last menstrual period 02/01/2015, SpO2 96 %, not currently breastfeeding. Body mass index is 33.18 kg/(m^2).  Patient Active Problem List   Diagnosis     Allergic rhinitis     Elevated blood pressure reading without diagnosis of hypertension     Hypertrophy of breast     Chronic neck and back pain     Pendulous breast       Wt Readings from Last 2 Encounters:   03/08/18 205 lb 8 oz (93.2 kg)   03/06/18 206 lb 12.8 oz (93.8 kg)     BP Readings from Last 3 Encounters:   03/08/18 112/77   03/06/18 125/79   02/06/18 123/81         Current Outpatient Prescriptions   Medication     IBUPROFEN PO     fexofenadine (ALLEGRA) 180 MG tablet     Glucosamine-Chondroitin (GLUCOSAMINE CHONDR COMPLEX PO)     Multiple Vitamin (MULTIVITAMIN) per tablet     No current facility-administered medications for this visit.        Social History   Substance Use Topics     Smoking status: Never Smoker     Smokeless tobacco: Never Used     Alcohol use 1.8 oz/week     3 Standard drinks or equivalent per week       Health Maintenance Due   Topic Date Due     HEPATITIS C SCREENING  04/28/1979     ADVANCE DIRECTIVE PLANNING Q5 YRS  04/28/2016       Lab Results   Component Value Date    PAP NIL 12/21/2017 March 8, 2018 9:16 AM  "

## 2018-03-08 NOTE — MR AVS SNAPSHOT
After Visit Summary   3/8/2018    Yola Amin    MRN: [id number]           Patient Information     Date Of Birth          1960        Visit Information        Provider Department      3/8/2018 9:20 AM Falguni Kasper PA-C [facility]        Today's Diagnoses     Preop general physical exam    -  1    Hypertrophy of breast        Pendulous breast        Chronic neck and back pain        Need for hepatitis C screening test        Hyperlipidemia LDL goal <160          Care Instructions      Before Your Surgery    Call your surgeon if there is any change in your health. This includes signs of a cold or flu (such as a sore throat, runny nose, cough, rash or fever).    Do not smoke, drink alcohol or take over the counter medicine (unless your surgeon or primary care doctor tells you to) for the 24 hours before and after surgery.    If you take prescribed drugs: Follow your doctor s orders about which medicines to take and which to stop until after surgery.    Eating and drinking prior to surgery: follow the instructions from your surgeon    Take a shower or bath the night before surgery. Use the soap your surgeon gave you to gently clean your skin. If you do not have soap from your surgeon, use your regular soap. Do not shave or scrub the surgery site.  Wear clean pajamas and have clean sheets on your bed.               Follow-ups after your visit        Your next 10 appointments already scheduled     Mar 22, 2018   Procedure with AKIRA Vizcarra MD   [facility] ([facility])    [address] [place], [place] [id number]   [phone]           Located in the Clinics and Surgery Center at [address] [place] [address].   parking is very convenient and highly recommended.  is a $6 flat rate fee.  Both  and self parkers should enter the main arrival plaza from [facility]; parking  "attendants will direct you based on your parking preference.            Apr 03, 2018  8:00 AM CDT   (Arrive by 7:45 AM)   Post-Op with AKIRA Vizcarra MD   [facility] ([facility])    [address] [facility]  [address] [place] [id number] [phone]              Who to contact     Please call your clinic at [phone] to:    Ask questions about your health    Make or cancel appointments    Discuss your medicines    Learn about your test results    Speak to your doctor            Additional Information About Your Visit        [facility] Information     [employer] gives you secure access to your electronic health record. If you see a primary care provider, you can also send messages to your care team and make appointments. If you have questions, please call your primary care clinic.  If you do not have a primary care provider, please call [phone] and they will assist you.      [employer] is an electronic gateway that provides easy, online access to your medical records. With [employer], you can request a clinic appointment, read your test results, renew a prescription or communicate with your care team.     To access your existing account, please contact your [facility] or call [phone] for assistance.        Care EveryWhere ID     This is your Care EveryWhere ID. This could be used by other organizations to access your [facility] medical records  [id number]        Your Vitals Were     Pulse Temperature Height Last Period Pulse Oximetry BMI (Body Mass Index)    60 98  F (36.7  C) (Oral) 5' 5.98\" (167.6 cm) 02/01/2015 96% 33.18 kg/m2       Blood Pressure from Last 3 Encounters:   03/08/18 112/77   03/06/18 125/79   02/06/18 123/81    Weight from Last 3 Encounters:   03/08/18 205 lb 8 oz (93.2 kg)   03/06/18 206 lb 12.8 oz (93.8 kg)   02/06/18 209 lb 14.4 oz (95.2 kg)              We Performed the Following     Hepatitis C antibody     Lipid " Panel Plus ([place])        Primary Care Provider Office Phone # Fax #    Falguni Kasper PA-C [phone]       [address] [place] [id number]        Equal Access to Services     ALFRED WILLIS : Hadii aad ku hadchaseo Sodeedeeali, waaxda luqadaha, qaybta kaalmada adeegyada, jerod jernigan mirianrose mary ziegler mikal stein. So [facility] [phone].    ATENCIÓN: Si habla español, tiene a augustine disposición servicios gratuitos de asistencia lingüística. Llame al [phone].    We comply with applicable federal civil rights laws and [place] laws. We do not discriminate on the basis of race, color, national origin, age, disability, sex, sexual orientation, or gender identity.            Thank you!     Thank you for choosing [facility]  for your care. Our goal is always to provide you with excellent care. Hearing back from our patients is one way we can continue to improve our services. Please take a few minutes to complete the written survey that you may receive in the mail after your visit with us. Thank you!             Your Updated Medication List - Protect others around you: Learn how to safely use, store and throw away your medicines at www.disposemymeds.org.          This list is accurate as of 3/8/18 10:40 AM.  Always use your most recent med list.                   Brand Name Dispense Instructions for use Diagnosis    fexofenadine 180 MG tablet    ALLEGRA     Take 1 tablet by mouth daily.        GLUCOSAMINE CHONDR COMPLEX PO      Take 1 capsule by mouth daily        IBUPROFEN PO      Take 200 mg by mouth every 4 hours as needed for moderate pain        multivitamin per tablet      Take 1 tablet by mouth daily.

## 2018-03-08 NOTE — PROGRESS NOTES
Yola Amin is here for her pre-op physical.  She also is wanting follow up labs drawn for her cholesterol.  She has lost weight and is eating a much healthier diet and getting regular exercise. No other cardiac risk factors.    Also due for hep C screening.    Will send results to patient through [facility] with recommendations for care.

## 2018-03-20 ENCOUNTER — TELEPHONE (OUTPATIENT)
Dept: SURGERY | Facility: CLINIC | Age: 58
End: 2018-03-20

## 2018-03-20 ENCOUNTER — ANESTHESIA EVENT (OUTPATIENT)
Dept: SURGERY | Facility: AMBULATORY SURGERY CENTER | Age: 58
End: 2018-03-20

## 2018-03-20 NOTE — TELEPHONE ENCOUNTER
Received a voice message from patient, wondering about appeal.  Lena-my co worker called [facility] on 3/14/18, appeal is still in review.  I called patient just now, she is interested in keeping Thursday's surgery appt.  I explained to her that once she pays for surgery and the appeal comes back 'approved' we can not bill it out to the insurance.  She understands, I called Bhupinder to call and collect from her, as she was already given a quote.

## 2018-03-22 ENCOUNTER — ANESTHESIA (OUTPATIENT)
Dept: SURGERY | Facility: AMBULATORY SURGERY CENTER | Age: 58
End: 2018-03-22

## 2018-03-22 ENCOUNTER — HOSPITAL ENCOUNTER (OUTPATIENT)
Facility: AMBULATORY SURGERY CENTER | Age: 58
End: 2018-03-22
Attending: PLASTIC SURGERY
Payer: COMMERCIAL

## 2018-03-22 ENCOUNTER — SURGERY (OUTPATIENT)
Age: 58
End: 2018-03-22

## 2018-03-22 VITALS
SYSTOLIC BLOOD PRESSURE: 140 MMHG | DIASTOLIC BLOOD PRESSURE: 91 MMHG | TEMPERATURE: 97.8 F | WEIGHT: 205.8 LBS | RESPIRATION RATE: 14 BRPM | BODY MASS INDEX: 33.07 KG/M2 | HEIGHT: 66 IN | OXYGEN SATURATION: 97 %

## 2018-03-22 DIAGNOSIS — Z98.890 S/P BILATERAL BREAST REDUCTION: Primary | ICD-10-CM

## 2018-03-22 RX ORDER — KETOROLAC TROMETHAMINE 30 MG/ML
30 INJECTION, SOLUTION INTRAMUSCULAR; INTRAVENOUS EVERY 6 HOURS PRN
Status: DISCONTINUED | OUTPATIENT
Start: 2018-03-22 | End: 2018-03-23 | Stop reason: HOSPADM

## 2018-03-22 RX ORDER — FENTANYL CITRATE 50 UG/ML
INJECTION, SOLUTION INTRAMUSCULAR; INTRAVENOUS PRN
Status: DISCONTINUED | OUTPATIENT
Start: 2018-03-22 | End: 2018-03-22

## 2018-03-22 RX ORDER — MEPERIDINE HYDROCHLORIDE 25 MG/ML
12.5 INJECTION INTRAMUSCULAR; INTRAVENOUS; SUBCUTANEOUS
Status: DISCONTINUED | OUTPATIENT
Start: 2018-03-22 | End: 2018-03-23 | Stop reason: HOSPADM

## 2018-03-22 RX ORDER — ONDANSETRON 2 MG/ML
INJECTION INTRAMUSCULAR; INTRAVENOUS PRN
Status: DISCONTINUED | OUTPATIENT
Start: 2018-03-22 | End: 2018-03-22

## 2018-03-22 RX ORDER — ONDANSETRON 2 MG/ML
4 INJECTION INTRAMUSCULAR; INTRAVENOUS EVERY 30 MIN PRN
Status: DISCONTINUED | OUTPATIENT
Start: 2018-03-22 | End: 2018-03-23 | Stop reason: HOSPADM

## 2018-03-22 RX ORDER — PROPOFOL 10 MG/ML
INJECTION, EMULSION INTRAVENOUS PRN
Status: DISCONTINUED | OUTPATIENT
Start: 2018-03-22 | End: 2018-03-22

## 2018-03-22 RX ORDER — CEFAZOLIN SODIUM 1 G/3ML
INJECTION, POWDER, FOR SOLUTION INTRAMUSCULAR; INTRAVENOUS PRN
Status: DISCONTINUED | OUTPATIENT
Start: 2018-03-22 | End: 2018-03-22

## 2018-03-22 RX ORDER — FENTANYL CITRATE 50 UG/ML
25-50 INJECTION, SOLUTION INTRAMUSCULAR; INTRAVENOUS
Status: DISCONTINUED | OUTPATIENT
Start: 2018-03-22 | End: 2018-03-22 | Stop reason: HOSPADM

## 2018-03-22 RX ORDER — OXYCODONE HYDROCHLORIDE 5 MG/1
5-10 TABLET ORAL EVERY 6 HOURS PRN
Qty: 30 TABLET | Refills: 0 | Status: SHIPPED | OUTPATIENT
Start: 2018-03-22 | End: 2018-04-03

## 2018-03-22 RX ORDER — FLUMAZENIL 0.1 MG/ML
0.2 INJECTION, SOLUTION INTRAVENOUS
Status: DISCONTINUED | OUTPATIENT
Start: 2018-03-22 | End: 2018-03-22 | Stop reason: HOSPADM

## 2018-03-22 RX ORDER — BUPIVACAINE HYDROCHLORIDE AND EPINEPHRINE 2.5; 5 MG/ML; UG/ML
INJECTION, SOLUTION INFILTRATION; PERINEURAL PRN
Status: DISCONTINUED | OUTPATIENT
Start: 2018-03-22 | End: 2018-03-22

## 2018-03-22 RX ORDER — SODIUM CHLORIDE, SODIUM LACTATE, POTASSIUM CHLORIDE, CALCIUM CHLORIDE 600; 310; 30; 20 MG/100ML; MG/100ML; MG/100ML; MG/100ML
INJECTION, SOLUTION INTRAVENOUS CONTINUOUS
Status: DISCONTINUED | OUTPATIENT
Start: 2018-03-22 | End: 2018-03-23 | Stop reason: HOSPADM

## 2018-03-22 RX ORDER — AMOXICILLIN 250 MG
1-2 CAPSULE ORAL 2 TIMES DAILY
Qty: 30 TABLET | Refills: 0 | Status: SHIPPED | OUTPATIENT
Start: 2018-03-22 | End: 2018-04-03

## 2018-03-22 RX ORDER — OXYCODONE HYDROCHLORIDE 5 MG/1
5 TABLET ORAL EVERY 4 HOURS PRN
Status: DISCONTINUED | OUTPATIENT
Start: 2018-03-22 | End: 2018-03-23 | Stop reason: HOSPADM

## 2018-03-22 RX ORDER — EPHEDRINE SULFATE 50 MG/ML
INJECTION, SOLUTION INTRAMUSCULAR; INTRAVENOUS; SUBCUTANEOUS PRN
Status: DISCONTINUED | OUTPATIENT
Start: 2018-03-22 | End: 2018-03-22

## 2018-03-22 RX ORDER — ONDANSETRON 4 MG/1
4 TABLET, ORALLY DISINTEGRATING ORAL EVERY 6 HOURS PRN
Qty: 8 TABLET | Refills: 0 | Status: SHIPPED | OUTPATIENT
Start: 2018-03-22 | End: 2018-04-03

## 2018-03-22 RX ORDER — GLYCOPYRROLATE 0.2 MG/ML
INJECTION, SOLUTION INTRAMUSCULAR; INTRAVENOUS PRN
Status: DISCONTINUED | OUTPATIENT
Start: 2018-03-22 | End: 2018-03-22

## 2018-03-22 RX ORDER — DEXAMETHASONE SODIUM PHOSPHATE 4 MG/ML
INJECTION, SOLUTION INTRA-ARTICULAR; INTRALESIONAL; INTRAMUSCULAR; INTRAVENOUS; SOFT TISSUE PRN
Status: DISCONTINUED | OUTPATIENT
Start: 2018-03-22 | End: 2018-03-22

## 2018-03-22 RX ORDER — SODIUM CHLORIDE, SODIUM LACTATE, POTASSIUM CHLORIDE, CALCIUM CHLORIDE 600; 310; 30; 20 MG/100ML; MG/100ML; MG/100ML; MG/100ML
INJECTION, SOLUTION INTRAVENOUS CONTINUOUS PRN
Status: DISCONTINUED | OUTPATIENT
Start: 2018-03-22 | End: 2018-03-22

## 2018-03-22 RX ORDER — NALOXONE HYDROCHLORIDE 0.4 MG/ML
.1-.4 INJECTION, SOLUTION INTRAMUSCULAR; INTRAVENOUS; SUBCUTANEOUS
Status: DISCONTINUED | OUTPATIENT
Start: 2018-03-22 | End: 2018-03-23 | Stop reason: HOSPADM

## 2018-03-22 RX ORDER — ACETAMINOPHEN 325 MG/1
975 TABLET ORAL ONCE
Status: COMPLETED | OUTPATIENT
Start: 2018-03-22 | End: 2018-03-22

## 2018-03-22 RX ORDER — ONDANSETRON 4 MG/1
4 TABLET, ORALLY DISINTEGRATING ORAL EVERY 30 MIN PRN
Status: DISCONTINUED | OUTPATIENT
Start: 2018-03-22 | End: 2018-03-23 | Stop reason: HOSPADM

## 2018-03-22 RX ORDER — GABAPENTIN 300 MG/1
300 CAPSULE ORAL ONCE
Status: COMPLETED | OUTPATIENT
Start: 2018-03-22 | End: 2018-03-22

## 2018-03-22 RX ORDER — NALOXONE HYDROCHLORIDE 0.4 MG/ML
.1-.4 INJECTION, SOLUTION INTRAMUSCULAR; INTRAVENOUS; SUBCUTANEOUS
Status: DISCONTINUED | OUTPATIENT
Start: 2018-03-22 | End: 2018-03-22 | Stop reason: HOSPADM

## 2018-03-22 RX ORDER — PROPOFOL 10 MG/ML
INJECTION, EMULSION INTRAVENOUS CONTINUOUS PRN
Status: DISCONTINUED | OUTPATIENT
Start: 2018-03-22 | End: 2018-03-22

## 2018-03-22 RX ADMIN — OXYCODONE HYDROCHLORIDE 5 MG: 5 TABLET ORAL at 12:41

## 2018-03-22 RX ADMIN — FENTANYL CITRATE 25 MCG: 50 INJECTION, SOLUTION INTRAMUSCULAR; INTRAVENOUS at 10:17

## 2018-03-22 RX ADMIN — SODIUM CHLORIDE, SODIUM LACTATE, POTASSIUM CHLORIDE, CALCIUM CHLORIDE: 600; 310; 30; 20 INJECTION, SOLUTION INTRAVENOUS at 09:28

## 2018-03-22 RX ADMIN — PROPOFOL 40 MG: 10 INJECTION, EMULSION INTRAVENOUS at 10:59

## 2018-03-22 RX ADMIN — FENTANYL CITRATE 25 MCG: 50 INJECTION, SOLUTION INTRAMUSCULAR; INTRAVENOUS at 10:13

## 2018-03-22 RX ADMIN — FENTANYL CITRATE 25 MCG: 50 INJECTION, SOLUTION INTRAMUSCULAR; INTRAVENOUS at 12:17

## 2018-03-22 RX ADMIN — GABAPENTIN 300 MG: 300 CAPSULE ORAL at 08:39

## 2018-03-22 RX ADMIN — FENTANYL CITRATE 50 MCG: 50 INJECTION, SOLUTION INTRAMUSCULAR; INTRAVENOUS at 09:41

## 2018-03-22 RX ADMIN — BUPIVACAINE HYDROCHLORIDE AND EPINEPHRINE 30 ML: 2.5; 5 INJECTION, SOLUTION INFILTRATION; PERINEURAL at 09:35

## 2018-03-22 RX ADMIN — Medication 200 MCG: at 10:32

## 2018-03-22 RX ADMIN — PROPOFOL 160 MG: 10 INJECTION, EMULSION INTRAVENOUS at 09:43

## 2018-03-22 RX ADMIN — CEFAZOLIN SODIUM 2 G: 1 INJECTION, POWDER, FOR SOLUTION INTRAMUSCULAR; INTRAVENOUS at 10:05

## 2018-03-22 RX ADMIN — GLYCOPYRROLATE 0.2 MG: 0.2 INJECTION, SOLUTION INTRAMUSCULAR; INTRAVENOUS at 09:41

## 2018-03-22 RX ADMIN — ONDANSETRON 4 MG: 2 INJECTION INTRAMUSCULAR; INTRAVENOUS at 11:44

## 2018-03-22 RX ADMIN — FENTANYL CITRATE 50 MCG: 50 INJECTION, SOLUTION INTRAMUSCULAR; INTRAVENOUS at 09:29

## 2018-03-22 RX ADMIN — ONDANSETRON 4 MG: 2 INJECTION INTRAMUSCULAR; INTRAVENOUS at 13:18

## 2018-03-22 RX ADMIN — FENTANYL CITRATE 50 MCG: 50 INJECTION, SOLUTION INTRAMUSCULAR; INTRAVENOUS at 12:10

## 2018-03-22 RX ADMIN — Medication 0.2 MG: at 11:37

## 2018-03-22 RX ADMIN — PROPOFOL 50 MCG/KG/MIN: 10 INJECTION, EMULSION INTRAVENOUS at 09:43

## 2018-03-22 RX ADMIN — ACETAMINOPHEN 975 MG: 325 TABLET ORAL at 08:39

## 2018-03-22 RX ADMIN — EPHEDRINE SULFATE 5 MG: 50 INJECTION, SOLUTION INTRAMUSCULAR; INTRAVENOUS; SUBCUTANEOUS at 10:34

## 2018-03-22 RX ADMIN — DEXAMETHASONE SODIUM PHOSPHATE 4 MG: 4 INJECTION, SOLUTION INTRA-ARTICULAR; INTRALESIONAL; INTRAMUSCULAR; INTRAVENOUS; SOFT TISSUE at 09:50

## 2018-03-22 RX ADMIN — FENTANYL CITRATE 25 MCG: 50 INJECTION, SOLUTION INTRAMUSCULAR; INTRAVENOUS at 12:21

## 2018-03-22 ASSESSMENT — LIFESTYLE VARIABLES: TOBACCO_USE: 0

## 2018-03-22 NOTE — IP AVS SNAPSHOT
MRN:[id number]                      After Visit Summary   3/22/2018    Yola Amin    MRN: [id number]           Thank you!     Thank you for choosing [facility] for your care. Our goal is always to provide you with excellent care. Hearing back from our patients is one way we can continue to improve our services. Please take a few minutes to complete the written survey that you may receive in the mail after you visit with us. Thank you!        Patient Information     Date Of Birth          1960        About your hospital stay     You were admitted on:  March 22, 2018 You last received care in the:  [facility]    You were discharged on:  March 22, 2018       Who to Call     For medical emergencies, please call 911.  For non-urgent questions about your medical care, please call your primary care provider or clinic, [phone]  For questions related to your surgery, please call your surgery clinic        Attending Provider     Provider Specialty    AKIRA Vizcarra MD Plastic Surgery       Primary Care Provider Office Phone # Fax #    Falguni Kasper PA-C [phone]      Your next 10 appointments already scheduled     Apr 03, 2018  8:00 AM CDT   (Arrive by 7:45 AM)   Post-Op with AKIRA Vizcarra MD   [facility] ([facility])    [address] [place] [address]  [address] [place] [id number] [phone]              Further instructions from your care team       BREAST REDUCTION POST-OPERATIVE INSTRUCTIONS    Instructions       Have someone drive you home after surgery and help you at home for 1-2      days.      Get plenty of rest.      Follow balanced diet.      Decreased activity may promote constipation, so you may want to add      more raw fruit to your diet, and be sure to increase fluid intake. Your doctor       may also order a stool softener.      Take pain medication as prescribed. Do not  take aspirin or any products      containing aspirin.      Do not drink alcohol when taking pain medications.      Even when not taking pain medications, no alcohol for 3 weeks as it      causes fluid retention.      If you are taking vitamins with iron, resume these as tolerated.      Do not smoke, as smoking delays healing and increases the risk of      complications.    Activities      Do not drive fpr 10 days following your procedure and until you are no longer taking        any pain medications (narcotics).      Do not drive until you have full range of motion with your arms and can stop the car       or swerve in an emergency.      Start walking the evening of surgery, this helps to reduce swelling and       lowers the chance of blood clots.      Refrain from vigorous activities for 2-6 weeks.  Increase activity gradually as tolerated.      After 2 weeks, you may perform lower body exercise, but must wait the full 6 weeks       prior to performing upper body exercise      Avoid lifting anything over 5 pounds for 2 weeks.      Resume social and employment activities in about 2 weeks (if not too strenuous).    Incision Care      You may shower in 48 hours.  If drainage tubes have been used, you may shower       48 hours after removal of the drains. The ACE wrap (if used) may be rewrapped as needed (if too tight or loose). Use it for support and may subtitute with a sports bra if preferred.       Avoid exposing scars to sun for at least 12 months.      Always use a strong sunblock, if sun exposure is unavoidable (SPF 50 or      greater).      Keep the tape on until it starts to curl up on the ends, and then gently remove them.        You may use moisturizing cream at 10 days to help the tape come off. By two weeks,      you may pull off the tape if still in place.      Wear your surgical bra/wrap 24/7 as directed for 4 weeks.      Avoid bras with stays and underwires for 4-6 weeks.      You may pad the incisions  "with gauze for comfort (panty liners also work well as they        are absorbent and inexpensive).      Inspect daily for signs of infection.      No tub soaking, bathing, or swimming until wounds are healed.      If your breast skin is dry after surgery, you can apply a moisturizer several times a       day.     What to Expect      Despite the three layers of sutures closing your incisions, there will be some oozing       of tissue fluid from them for 2 days or so.  This will soak up on the gauze and the bra       to look like more than it really is.  Report any significant drainage to the clinic.      Most of the higher discomfort will subside after the first few days.      You may experience temporary soreness, bruising, swelling and tightnessin the       breasts as well as discomfort in the incision area.      You may have have normal sensation in the nipples.  This may be more or less than       usual, and usually returns over a couple of months.      Your first menstruation following surgery may cause your breasts to swell and hurt.      You may have random shooting pains, tingling, or other strange sensations in the            skin for a few months.  These will subside.    Appearance      Most of the discoloration and swelling will subside in 2-4 weeks.      Your breasts will feel firm to the touch initially, but will soften with time.      A more natural shape will occur as the breasts \"settle\" in a slightly lower position over     the first few months.      Scars may be red and thick for 6-12 months (longer in lighter-skinned patients).  IN          time, these usually soften and fade.    Follow-Up Care      Typically, you will have a post op check at 1-2 weeks, and again with your surgeon in      another month.      If drainage tubes have been used, will be removed when the drainage is less than 30      ml per day for 1-2 days.  This usually happens in 1-3 weeks.    When to Call      If you have increased " swelling or bruising, particular one side greater than the other.      If swelling and redness persist after a few days.      If you have increased redness along the incision.      If you have severe or increased pain not relieved by medication.      If you have any side effects to medications; such as, rash, nausea,      headache, vomiting, or constipation.      If you have an oral temperature over 100.4 degrees.      If you have any yellowish or greenish drainage from the incisions or      notice a foul odor.      If you have bleeding from the incisions that is difficult to control with      light pressure.      If you have loss of feeling or motion.      Any unanswered concern.    For Medical Questions, Please Call:      [phone], Monday - Friday, 8 a.m. - 4:30 p.m.      After hours and on weekends, call Hospital Paging at [phone] and      ask for the Plastic Surgeon on call.      [facility]  Home Care Following Anesthesia  For 24 hours after surgery:  1. Get plenty of rest.  A responsible adult must stay with you for at least 24 hours after you leave the surgery center.  2. Do not drive or use heavy equipment.  If you have weakness or tingling, don't drive or use heavy equipment until this feeling goes away.   3. Do not drink alcohol.   4. Avoid strenuous or risky activities.  Ask for help when climbing stairs.  5. You may feel lightheaded.  IF so, sit for a few minutes before standing.  Have someone help you get up.   6. If you have nausea (feel sick to your stomach): Drink only clear liquids such as apple juice, ginger ale, broth or 7-Up.  Rest may also help.  Be sure to drink enough fluids.  Move to a regular diet as you feel able.   7. You may have a slight fever.  Call the doctor if your fever is over 100 F (37.7 C) (taken under the tongue) or lasts longer than 24 hours.  8. You may have a dry mouth, a sore throat, muscle aches or trouble sleeping. These should  "go away after 24 hours.  9. Do not make important or legal decisions.        Today you received an Exparel block to numb the nerves near your surgery site.  This is a block using local anesthetic or \"numbing\" medication injected around the nerves to anesthetize or \"numb\" the area supplied by those nerves.  This block is injected into the muscle layer near your surgical site.  This medication may numb the location where you had surgery up to 72 hours.  If your surgical site is an arm or leg you should be careful with your affected limb, since it is possible to injure your limb without being aware of it due to the numbing.  Until full feeling returns, you should guard against bumping or hitting your limb, and avoid extreme hot or cold temperatures on the skin.  As the block wears off, the feeling will return as a tingling or prickly sensation near your surgical site.  You will experince more discomfort from your incision as the feeling returns.  You may want to take a pain pill (a narcotic or Tylenol if this was prescribed by your surgeon) when you start to experience mild pain before the pain beomes more severe.  If your pain medications do not control your pain, you should notify your surgeon.    Tips for taking pain medications  To get the best pain relief possible, remember these points:    Take pain medications as directed, before pain becomes severe.    Pain medication can upset your stomach: taking it with food may help.    Constipation is a common side effect of pain medication. Drink plenty of  fluids.    Eat foods high in fiber. Take a stool softener if recommended by your doctor or pharmacist.    Do not drink alcohol, drive or operate machinery while taking pain medications.    Ask about other ways to control pain, such as with heat, ice or relaxation.    Tylenol/Acetaminophen Consumption  To help encourage the safe use of acetaminophen, the makers of TYLENOL  have lowered the maximum daily dose for " "single-ingredient Extra Strength TYLENOL  (acetaminophen) products sold in the [place] from 8 pills per day (4,000 mg) to 6 pills per day (3,000 mg). The dosing interval has also changed from 2 pills every 4-6 hours to 2 pills every 6 hours.    If you feel your pain relief is insufficient, you may take Tylenol/Acetaminophen in addition to your narcotic pain medication.     Be careful not to exceed 3,000 mg of Tylenol/Acetaminophen in a 24 hour period from all sources.    If you are taking extra strength Tylenol/acetaminophen (500 mg), the maximum dose is 6 tablets in 24 hours.    If you are taking regular strength acetaminophen (325 mg), the maximum dose is 9 tablets in 24 hours.    Call a doctor for any of the followin. Signs of infection (fever, growing tenderness at the surgery site, a large amount of drainage or bleeding, severe pain, foul-smelling drainage, redness, swelling).  2. It has been over 8 to 10 hours since surgery and you are still not able to urinate (pass water).  3. Headache for over 24 hours.  4. Numbness, tingling or weakness the day after surgery (if you had spinal anesthesia).  Your doctor is:  Dr. Tyler Vizcarra, Plastic Surgery: [phone]                    Or dial [phone] and ask for the resident on call for:  Plastics  For emergency care, call the:  [facility]:  [phone] (TTY for hearing impaired: [phone])      Information about liposomal bupivacaine (Exparel)    What is Liposomal Bupivacaine?    Liposomal Bupivacaine is a numbing medication that can help you manage your pain after surgery.  This medication is similar to \"novacaine,\" which is often used by the dentist.  Liposomal bupivacaine is released slowly and can help control pain for up to 72 hours.    What is the purpose of Liposomal Bupivacaine?    To manage your pain after surgery    To help you sleep better, take deep breaths, walk more comfortable, and feel up to visiting with others    How " is the procedure done?    Liposomal bupivacaine is a medication given by an injection.    It is usually given right before your surgery.  If this is the case, you will be awake or sedated, but you should experience minimal pain during the procedure.    For some people, the injection may be given at the very end of your surgery.  It all depends on the type of surgery and your situation.    The procedure usually takes about 5-15 minutes.  An ultrasound machine will help the anesthesiologist insert it in the right place or the surgeon will inject it under direct vision.     A needle is used to place the numbing medication under your skin.  It provides pain relief by numbing the tissue in the area where your surgeon will make the incision.    What can I expect?    You may experience numbness, tingling, or a feeling of heaviness around the area that was injected.    If you experience any of the follow symptoms IMMEDIATELY CALL THE REGIONAL ANESTHESIA PAIN SERVICE:    Numbness or tingling occurs in areas other than around the injection site    Blurry vision    Ringing in your ears    A metallic taste in your mouth    PAGE: Dial [phone].  When prompted, enter the following 4-digit ID number:  [id number].  You will be prompted to enter your phone number; and then enter the # sign.  The clinician on call will call you back.    OR  CALL: Dial [phone].  Let the hospital  know that you are having a problem with a nerve block and that you would like to speak to the regional anesthesia pain service right away.    You should not receive any other type of numbing medication within 4 days after receiving liposomal bupivacaine unless your anesthesiologist approves.    Post Operative Instructions: Regional Anesthetic with Liposomal Bupivacaine for Chest and Abdominal Surgery  General Information:   Regional anesthesia is when local anesthetic or  numbing  medication is injected around the nerves to anesthetize or  numb   "the area supplied by that set of nerves.     Types of Regional Blocks:  Transversus Abdominis Plane (TAP): A block injected beneath the covering of a muscle layer of the abdomen for abdominal surgery  Pectoral: A block injected near the breast for surgery on the breast and armpit  Paravertebral: A block injected in the back for surgery on the chest, ribs, and breast    Procedure:  The type of anesthesia your doctor used to numb your chest or abdomen will usually not wear off for 24-48 hours, but may last as long as 72 hours.     Diet:  There are no restrictions on your diet. You should drink plenty of fluids.     Discomfort:  You will have a tingling and prickly sensation in your chest or abdomen as the feeling begins to return. You can also expect some discomfort. The amount of discomfort is unpredictable, but if you have more pain than can be controlled with pain medication you should notify your physician.     Pain Medicine:   Begin taking your oral pain pills before bedtime and during the night to avoid a sudden onset of pain as part of the block wears off.  Do not engage in drinking, driving, or hazardous occupations while taking pain medication.     Stitches:   You may have stitches or special skin closures. You doctor will inform you when to return to the office to have them removed.             Pending Results     Date and Time Order Name Status Description    3/22/2018 1112 Surgical pathology exam In process             Admission Information     Date & Time Provider Department Dept. Phone    3/22/2018 AKIRA Vizcarra MD [facility] [phone]      Your Vitals Were     Blood Pressure Temperature Respirations Height Weight Last Period    125/90 98.1  F (36.7  C) (Oral) 10 1.676 m (5' 5.98\") 93.4 kg (205 lb 12.8 oz) 02/01/2015    Pulse Oximetry BMI (Body Mass Index)                97% 33.24 kg/m2          [employer] Information     [employer] gives you secure access to your " electronic health record. If you see a primary care provider, you can also send messages to your care team and make appointments. If you have questions, please call your primary care clinic.  If you do not have a primary care provider, please call [phone] and they will assist you.      [employer] is an electronic gateway that provides easy, online access to your medical records. With [employer], you can request a clinic appointment, read your test results, renew a prescription or communicate with your care team.     To access your existing account, please contact your [facility] or call [phone] for assistance.        Care EveryWhere ID     This is your Care EveryWhere ID. This could be used by other organizations to access your [facility] medical records  [id number]        Equal Access to Services     ALFRED WILLIS : Celsa Majano, waalexa parra, qaapple kaalmaapoorva gallardo, jerod stein. So [facility] [phone].    ATENCIÓN: Si habla español, tiene a augustine disposición servicios gratuitos de asistencia lingüística. Llame al [phone].    We comply with applicable federal civil rights laws and [place] laws. We do not discriminate on the basis of race, color, national origin, age, disability, sex, sexual orientation, or gender identity.               Review of your medicines      START taking        Dose / Directions    ondansetron 4 MG ODT tab   Commonly known as:  ZOFRAN-ODT   Used for:  S/P bilateral breast reduction        Dose:  4 mg   Take 1 tablet (4 mg) by mouth every 6 hours as needed for nausea   Quantity:  8 tablet   Refills:  0       oxyCODONE IR 5 MG tablet   Commonly known as:  ROXICODONE   Used for:  S/P bilateral breast reduction        Dose:  5-10 mg   Take 1-2 tablets (5-10 mg) by mouth every 6 hours as needed for other (Moderate to Severe Pain)   Quantity:  30 tablet   Refills:  0       senna-docusate 8.6-50 MG per tablet    Commonly known as:  SENOKOT-S;PERICOLACE   Used for:  S/P bilateral breast reduction        Dose:  1-2 tablet   Take 1-2 tablets by mouth 2 times daily   Quantity:  30 tablet   Refills:  0         CONTINUE these medicines which have NOT CHANGED        Dose / Directions    fexofenadine 180 MG tablet   Commonly known as:  ALLEGRA        Dose:  1 tablet   Take 1 tablet by mouth daily.   Refills:  0       GLUCOSAMINE CHONDR COMPLEX PO        Dose:  1 capsule   Take 1 capsule by mouth daily   Refills:  0       IBUPROFEN PO        Dose:  200 mg   Take 200 mg by mouth every 4 hours as needed for moderate pain   Refills:  0       multivitamin per tablet        Dose:  1 tablet   Take 1 tablet by mouth daily.   Refills:  0            Where to get your medicines      These medications were sent to [facility] [address] [place] [address] [address] [place] [address] [address] [place] [id number]    Hours:  TRANSPLANT PHONE NUMBER [phone] Phone:  [phone]     ondansetron 4 MG ODT tab    senna-docusate 8.6-50 MG per tablet         Some of these will need a paper prescription and others can be bought over the counter. Ask your nurse if you have questions.     Bring a paper prescription for each of these medications     oxyCODONE IR 5 MG tablet                Protect others around you: Learn how to safely use, store and throw away your medicines at www.disposemymeds.org.        Information about OPIOIDS     PRESCRIPTION OPIOIDS: WHAT YOU NEED TO KNOW    Prescription opioids can be used to help relieve moderate to severe pain and are often prescribed following a surgery or injury, or for certain health conditions. These medications can be an important part of treatment but also come with serious risks. It is important to work with your health care provider to make sure you are getting the safest, most effective care.    WHAT ARE THE RISKS AND SIDE EFFECTS OF OPIOID USE?  Prescription  opioids carry serious risks of addiction and overdose, especially with prolonged use. An opioid overdose, often marked by slowed breathing can cause sudden death. The use of prescription opioids can have a number of side effects as well, even when taken as directed:      Tolerance - meaning you might need to take more of a medication for the same pain relief    Physical dependence - meaning you have symptoms of withdrawal when a medication is stopped    Increased sensitivity to pain    Constipation    Nausea, vomiting, and dry mouth    Sleepiness and dizziness    Confusion    Depression    Low levels of testosterone that can result in lower sex drive, energy, and strength    Itching and sweating    RISKS ARE GREATER WITH:    History of drug misuse, substance use disorder, or overdose    Mental health conditions (such as depression or anxiety)    Sleep apnea    Older age (65 years or older)    Pregnancy    Avoid alcohol while taking prescription opioids.   Also, unless specifically advised by your health care provider, medications to avoid include:    Benzodiazepines (such as Xanax or Valium)    Muscle relaxants (such as Soma or Flexeril)    Hypnotics (such as Ambien or Lunesta)    Other prescription opioids    KNOW YOUR OPTIONS:  Talk to your health care provider about ways to manage your pain that do not involve prescription opioids. Some of these options may actually work better and have fewer risks and side effects:    Pain relievers such as acetaminophen, ibuprofen, and naproxen    Some medications that are also used for depression or seizures    Physical therapy and exercise    Cognitive behavioral therapy, a psychological, goal-directed approach, in which patients learn how to modify physical, behavioral, and emotional triggers of pain and stress    IF YOU ARE PRESCRIBED OPIOIDS FOR PAIN:    Never take opioids in greater amounts or more often than prescribed    Follow up with your primary health care provider  and work together to create a plan on how to manage your pain.    Talk about ways to help manage your pain that do not involve prescription opioids    Talk about all concerns and side effects    Help prevent misuse and abuse    Never sell or share prescription opioids    Never use another person's prescription opioids    Store prescription opioids in a secure place and out of reach of others (this may include visitors, children, friends, and family)    Visit www.cdc.gov/drugoverdose to learn about risks of opioid abuse and overdose    If you believe you may be struggling with addiction, tell your health care provider and ask for guidance or call [facility]'s National Helpline at 7-684-249-HELP    LEARN MORE / www.cdc.gov/drugoverdose/prescribing/guideline.html    Safely dispose of unused prescription opioids: Find your local drug take-back programs and more information about the importance of safe disposal at www.doseofreality.mn.gov             Medication List: This is a list of all your medications and when to take them. Check marks below indicate your daily home schedule. Keep this list as a reference.      Medications           Morning Afternoon Evening Bedtime As Needed    fexofenadine 180 MG tablet   Commonly known as:  ALLEGRA   Take 1 tablet by mouth daily.                                GLUCOSAMINE CHONDR COMPLEX PO   Take 1 capsule by mouth daily                                IBUPROFEN PO   Take 200 mg by mouth every 4 hours as needed for moderate pain                                multivitamin per tablet   Take 1 tablet by mouth daily.                                ondansetron 4 MG ODT tab   Commonly known as:  ZOFRAN-ODT   Take 1 tablet (4 mg) by mouth every 6 hours as needed for nausea                                oxyCODONE IR 5 MG tablet   Commonly known as:  ROXICODONE   Take 1-2 tablets (5-10 mg) by mouth every 6 hours as needed for other (Moderate to Severe Pain)                                 senna-docusate 8.6-50 MG per tablet   Commonly known as:  SENOKOT-S;PERICOLACE   Take 1-2 tablets by mouth 2 times daily

## 2018-03-22 NOTE — IP AVS SNAPSHOT
[facility]    [address] [place] [id number]    Phone:  [phone]    Fax:  [phone]                                       After Visit Summary   3/22/2018    Yola Amin    MRN: [id number]           After Visit Summary Signature Page     I have received my discharge instructions, and my questions have been answered. I have discussed any challenges I see with this plan with the nurse or doctor.    ..........................................................................................................................................  Patient/Patient Representative Signature      ..........................................................................................................................................  Patient Representative Print Name and Relationship to Patient    ..................................................               ................................................  Date                                            Time    ..........................................................................................................................................  Reviewed by Signature/Title    ...................................................              ..............................................  Date                                                            Time

## 2018-03-22 NOTE — ANESTHESIA PROCEDURE NOTES
Peripheral Nerve Block Procedure Note    Staff:     Anesthesiologist:  KEYA FLORES    Referred By:  AKIRA FLORES  Location: Pre-op  Procedure Start/Stop TImes:      3/22/2018 9:30 AM     3/22/2018 9:35 AM    patient identified, IV checked, site marked, risks and benefits discussed, informed consent, monitors and equipment checked, pre-op evaluation, at physician/surgeon's request and post-op pain management      Correct Patient: Yes      Correct Position: Yes      Correct Site: Yes      Correct Procedure: Yes      Correct Laterality:  Yes    Site Marked:  Yes  Procedure details:     Procedure:  Pectoralis    ASA:  2    Diagnosis:  Breast surgery    Laterality:  Bilateral    Position:  Supine    Sterile Prep: chloraprep, mask and sterile gloves      Needle:  Short bevel    Needle gauge:  21    Needle length (inches):  4    Ultrasound: Yes      Ultrasound used to identify targeted nerve, plexus, or vascular structure and placed a needle adjacent to it      Permanent Image entered into patiient's record      Abnormal pain on injection: No      Blood Aspirated: No      Paresthesias:  No    Bleeding at site: No      Bolus via:  Needle    Infusion Method:  Single Shot    Complications:  None  Assessment/Narrative:     Injection made incrementally with aspirations every (mL):  5     Informed consent obtained.  All risks and benefits of the nerve block discussed with the patient.  All questions answered and all parties agreed with the plan.   Block was placed at the surgeon's request for post operative pain control.

## 2018-03-22 NOTE — BRIEF OP NOTE
[facility]    Brief Operative Note    Pre-operative diagnosis: Breast Hypertrophy  Post-operative diagnosis * No post-op diagnosis entered *  Procedure: Procedure(s):  Bilateral Breast Reduction - Wound Class: I-Clean  Surgeon: Surgeon(s) and Role:     * AKIRA Vizcarra MD - Primary  Anesthesia: Combined General with Block   Estimated blood loss: Less than 100 ml  Drains: None  Specimens:   ID Type Source Tests Collected by Time Destination   A : right breast tissue  Tissue Breast, Right SURGICAL PATHOLOGY EXAM AKIRA Vizcarra MD 3/22/2018 10:30 AM    B : left breast tissue  Tissue Breast, Left SURGICAL PATHOLOGY EXAM AKIRA Vizcarra MD 3/22/2018 10:40 AM      Findings:   None.  Complications: None.  Implants: None.

## 2018-03-22 NOTE — OP NOTE
PREOPERATIVE DIAGNOSIS: Symptomatic bilateral breast hypertrophy.     POSTOPERATIVE DIAGNOSIS: Symptomatic bilateral breast hypertrophy.     PROCEDURES: COSMETIC Bilateral superomedial pedicle inverted T skin closure breast reduction.     SURGEON: Tyler Vizcarra MD.     FELLOW: Mtaty Butt MD    ANESTHESIA: General anesthesia with endotracheal intubation.     COMPLICATIONS: Nil.     DRAINS: Nil.     Blood Loss: 100 mL    SPECIMENS: Skin and breast tissue from right breast measuring about 700 g, left breast measuring about 500 g.     DESCRIPTION OF PROCEDURE: After informed consent was taken, the proper site and procedure was ascertained with the patient and was appropriately marked and taken to in the operating room.  She was placed in supine position with the knees comfortably flexed with pillows underneath them, and pneumoboots placed and running prior to induction of anesthesia. Preoperative antibiotics given in the OR. All pressure points were appropriately padded. General anesthesia was administered without any complications. She was placed in such a position that she could be flexed to about 50 degrees. Her arms were padded and abducted to about 50 degrees. She was prepped and draped in a standard surgical fashion. I began by first remarking the preop markings and marking a 42 mm areola on each side. I then marked out a superior medial pedicle on each side. I then de-epithelialized the pedicle on each side. I then dissected out the pedicle on each side without actually seeing the deep fascia and also released the pedicle such that it could rotate into its new nipple position without any tension. I then went ahead and on each side excised the inferomedial, inferior, inferolateral and lateral aspects of the breast according to a vertical pattern reduction. Strict hemostasis was ensured during this entire part of the case. Once this was done, I then temporarily closed the patient's breast in an inverted T  fashion, sat the patient up ensured symmetry and then marked out the new areolar opening symmetrically on each side. I then went ahead and closed the horizontal incision using 2-0 Monocryl suture in a deep dermal layer and 3-0 Strattafix suture. Then I made sure that the nipple areolar complex could be retrieved without any tension, which is could on each side. I then checked that they will both pink and viable, which they were. I then went ahead and excised the skin of the new areolar opening and de-epithelialized epithelialized the portion that was involved in the pedicle on each side. I then sutured in the nipple areolar complex using 2-0 Monocryl suture in a deep dermal fashion circumferentially. I then closed the vertical incision using 2-0 Monocryl suture to approximate the medial and lateral pillars, and then the deep dermis. I then ran all the incisions with 3-0 Monocryl suture in a running intracuticular manner followed by placement of Prineo, and then followed by an ACE wrap. At the end of the case, the patient's breasts were soft, nipples were pink, and breasts were symmetric. The patient tolerated the procedure well. All counts correct at the end of the case. The patient was extubated and sent to recovery room in a stable condition.

## 2018-03-22 NOTE — ANESTHESIA POSTPROCEDURE EVALUATION
Patient: Yola Amin    Procedure(s):  Bilateral Breast Reduction - Wound Class: I-Clean    Diagnosis:Breast Hypertrophy  Diagnosis Additional Information: No value filed.    Anesthesia Type:  General, LMA, Periph. Nerve Block for postop pain    Note:  Anesthesia Post Evaluation    Patient location during evaluation: Phase 2  Patient participation: Able to fully participate in evaluation  Level of consciousness: awake and alert  Pain management: adequate  Airway patency: patent  Cardiovascular status: acceptable  Respiratory status: acceptable  Hydration status: acceptable  PONV: none     Anesthetic complications: None          Last vitals:  Vitals:    03/22/18 1215 03/22/18 1230 03/22/18 1255   BP: 125/81 145/79 (!) 140/91   Resp: 14 14 14   Temp: 36.4  C (97.5  F) 36.4  C (97.6  F) 36.6  C (97.8  F)   SpO2: 95% 94% 97%         Electronically Signed By: Rey Charles DO  March 22, 2018  1:11 PM

## 2018-03-22 NOTE — ANESTHESIA PREPROCEDURE EVALUATION
Anesthesia Evaluation     . Pt has had prior anesthetic.     No history of anesthetic complications          ROS/MED HX    ENT/Pulmonary:     (+)allergic rhinitis, , . .   (-) tobacco use   Neurologic:  - neg neurologic ROS     Cardiovascular:  - neg cardiovascular ROS   (+) ----. : . . . :. . No previous cardiac testing       METS/Exercise Tolerance:  >4 METS   Hematologic:  - neg hematologic  ROS       Musculoskeletal:  - neg musculoskeletal ROS       GI/Hepatic:  - neg GI/hepatic ROS       Renal/Genitourinary:  - ROS Renal section negative       Endo:  - neg endo ROS       Psychiatric:  - neg psychiatric ROS       Infectious Disease:  - neg infectious disease ROS       Malignancy:      - no malignancy   Other:    - neg other ROS                 Physical Exam  Normal systems: cardiovascular, pulmonary and dental    Airway   Mallampati: II  TM distance: >3 FB  Neck ROM: full    Dental     Cardiovascular   Rhythm and rate: regular and normal      Pulmonary    breath sounds clear to auscultation                    Anesthesia Plan      History & Physical Review  History and physical reviewed and following examination; no interval change.    ASA Status:  2 .    NPO Status:  > 8 hours    Plan for General, LMA and Periph. Nerve Block for postop pain with Intravenous and Propofol induction. Maintenance will be TIVA.    PONV prophylaxis:  Ondansetron (or other 5HT-3) and Dexamethasone or Solumedrol  H/O PONV - TIVA  PEC block in pre op      Postoperative Care  Postoperative pain management:  Multi-modal analgesia and Peripheral nerve block (Single Shot).      Consents  Anesthetic plan, risks, benefits and alternatives discussed with:  Patient.  Use of blood products discussed: No .   .                          .

## 2018-03-22 NOTE — DISCHARGE INSTRUCTIONS
BREAST REDUCTION POST-OPERATIVE INSTRUCTIONS    Instructions       Have someone drive you home after surgery and help you at home for 1-2      days.      Get plenty of rest.      Follow balanced diet.      Decreased activity may promote constipation, so you may want to add      more raw fruit to your diet, and be sure to increase fluid intake. Your doctor       may also order a stool softener.      Take pain medication as prescribed. Do not take aspirin or any products      containing aspirin.      Do not drink alcohol when taking pain medications.      Even when not taking pain medications, no alcohol for 3 weeks as it      causes fluid retention.      If you are taking vitamins with iron, resume these as tolerated.      Do not smoke, as smoking delays healing and increases the risk of      complications.    Activities      Do not drive fpr 10 days following your procedure and until you are no longer taking        any pain medications (narcotics).      Do not drive until you have full range of motion with your arms and can stop the car       or swerve in an emergency.      Start walking the evening of surgery, this helps to reduce swelling and       lowers the chance of blood clots.      Refrain from vigorous activities for 2-6 weeks.  Increase activity gradually as tolerated.      After 2 weeks, you may perform lower body exercise, but must wait the full 6 weeks       prior to performing upper body exercise      Avoid lifting anything over 5 pounds for 2 weeks.      Resume social and employment activities in about 2 weeks (if not too strenuous).    Incision Care      You may shower in 48 hours.  If drainage tubes have been used, you may shower       48 hours after removal of the drains. The ACE wrap (if used) may be rewrapped as needed (if too tight or loose). Use it for support and may subtitute with a sports bra if preferred.       Avoid exposing scars to sun for at least 12 months.      Always use a strong  "sunblock, if sun exposure is unavoidable (SPF 50 or      greater).      Keep the tape on until it starts to curl up on the ends, and then gently remove them.        You may use moisturizing cream at 10 days to help the tape come off. By two weeks,      you may pull off the tape if still in place.      Wear your surgical bra/wrap 24/7 as directed for 4 weeks.      Avoid bras with stays and underwires for 4-6 weeks.      You may pad the incisions with gauze for comfort (panty liners also work well as they        are absorbent and inexpensive).      Inspect daily for signs of infection.      No tub soaking, bathing, or swimming until wounds are healed.      If your breast skin is dry after surgery, you can apply a moisturizer several times a       day.     What to Expect      Despite the three layers of sutures closing your incisions, there will be some oozing       of tissue fluid from them for 2 days or so.  This will soak up on the gauze and the bra       to look like more than it really is.  Report any significant drainage to the clinic.      Most of the higher discomfort will subside after the first few days.      You may experience temporary soreness, bruising, swelling and tightnessin the       breasts as well as discomfort in the incision area.      You may have have normal sensation in the nipples.  This may be more or less than       usual, and usually returns over a couple of months.      Your first menstruation following surgery may cause your breasts to swell and hurt.      You may have random shooting pains, tingling, or other strange sensations in the            skin for a few months.  These will subside.    Appearance      Most of the discoloration and swelling will subside in 2-4 weeks.      Your breasts will feel firm to the touch initially, but will soften with time.      A more natural shape will occur as the breasts \"settle\" in a slightly lower position over     the first few months.      Scars may " be red and thick for 6-12 months (longer in lighter-skinned patients).  IN          time, these usually soften and fade.    Follow-Up Care      Typically, you will have a post op check at 1-2 weeks, and again with your surgeon in      another month.      If drainage tubes have been used, will be removed when the drainage is less than 30      ml per day for 1-2 days.  This usually happens in 1-3 weeks.    When to Call      If you have increased swelling or bruising, particular one side greater than the other.      If swelling and redness persist after a few days.      If you have increased redness along the incision.      If you have severe or increased pain not relieved by medication.      If you have any side effects to medications; such as, rash, nausea,      headache, vomiting, or constipation.      If you have an oral temperature over 100.4 degrees.      If you have any yellowish or greenish drainage from the incisions or      notice a foul odor.      If you have bleeding from the incisions that is difficult to control with      light pressure.      If you have loss of feeling or motion.      Any unanswered concern.    For Medical Questions, Please Call:      [phone], Monday - Friday, 8 a.m. - 4:30 p.m.      After hours and on weekends, call Hospital Paging at [phone] and      ask for the Plastic Surgeon on call.      [facility]  Home Care Following Anesthesia  For 24 hours after surgery:  1. Get plenty of rest.  A responsible adult must stay with you for at least 24 hours after you leave the surgery center.  2. Do not drive or use heavy equipment.  If you have weakness or tingling, don't drive or use heavy equipment until this feeling goes away.   3. Do not drink alcohol.   4. Avoid strenuous or risky activities.  Ask for help when climbing stairs.  5. You may feel lightheaded.  IF so, sit for a few minutes before standing.  Have someone help you get up.   6. If  "you have nausea (feel sick to your stomach): Drink only clear liquids such as apple juice, ginger ale, broth or 7-Up.  Rest may also help.  Be sure to drink enough fluids.  Move to a regular diet as you feel able.   7. You may have a slight fever.  Call the doctor if your fever is over 100 F (37.7 C) (taken under the tongue) or lasts longer than 24 hours.  8. You may have a dry mouth, a sore throat, muscle aches or trouble sleeping. These should go away after 24 hours.  9. Do not make important or legal decisions.        Today you received an Exparel block to numb the nerves near your surgery site.  This is a block using local anesthetic or \"numbing\" medication injected around the nerves to anesthetize or \"numb\" the area supplied by those nerves.  This block is injected into the muscle layer near your surgical site.  This medication may numb the location where you had surgery up to 72 hours.  If your surgical site is an arm or leg you should be careful with your affected limb, since it is possible to injure your limb without being aware of it due to the numbing.  Until full feeling returns, you should guard against bumping or hitting your limb, and avoid extreme hot or cold temperatures on the skin.  As the block wears off, the feeling will return as a tingling or prickly sensation near your surgical site.  You will experince more discomfort from your incision as the feeling returns.  You may want to take a pain pill (a narcotic or Tylenol if this was prescribed by your surgeon) when you start to experience mild pain before the pain beomes more severe.  If your pain medications do not control your pain, you should notify your surgeon.    Tips for taking pain medications  To get the best pain relief possible, remember these points:    Take pain medications as directed, before pain becomes severe.    Pain medication can upset your stomach: taking it with food may help.    Constipation is a common side effect of pain " medication. Drink plenty of  fluids.    Eat foods high in fiber. Take a stool softener if recommended by your doctor or pharmacist.    Do not drink alcohol, drive or operate machinery while taking pain medications.    Ask about other ways to control pain, such as with heat, ice or relaxation.    Tylenol/Acetaminophen Consumption  To help encourage the safe use of acetaminophen, the makers of TYLENOL  have lowered the maximum daily dose for single-ingredient Extra Strength TYLENOL  (acetaminophen) products sold in the [place] from 8 pills per day (4,000 mg) to 6 pills per day (3,000 mg). The dosing interval has also changed from 2 pills every 4-6 hours to 2 pills every 6 hours.    If you feel your pain relief is insufficient, you may take Tylenol/Acetaminophen in addition to your narcotic pain medication.     Be careful not to exceed 3,000 mg of Tylenol/Acetaminophen in a 24 hour period from all sources.    If you are taking extra strength Tylenol/acetaminophen (500 mg), the maximum dose is 6 tablets in 24 hours.    If you are taking regular strength acetaminophen (325 mg), the maximum dose is 9 tablets in 24 hours.    Call a doctor for any of the followin. Signs of infection (fever, growing tenderness at the surgery site, a large amount of drainage or bleeding, severe pain, foul-smelling drainage, redness, swelling).  2. It has been over 8 to 10 hours since surgery and you are still not able to urinate (pass water).  3. Headache for over 24 hours.  4. Numbness, tingling or weakness the day after surgery (if you had spinal anesthesia).  Your doctor is:  Dr. Tyler Vizcarra, Plastic Surgery: [phone]                    Or dial [phone] and ask for the resident on call for:  Plastics  For emergency care, call the:  [facility]:  [phone] (TTY for hearing impaired: [phone])      Information about liposomal bupivacaine (Exparel)    What is Liposomal Bupivacaine?    Liposomal Bupivacaine  "is a numbing medication that can help you manage your pain after surgery.  This medication is similar to \"novacaine,\" which is often used by the dentist.  Liposomal bupivacaine is released slowly and can help control pain for up to 72 hours.    What is the purpose of Liposomal Bupivacaine?    To manage your pain after surgery    To help you sleep better, take deep breaths, walk more comfortable, and feel up to visiting with others    How is the procedure done?    Liposomal bupivacaine is a medication given by an injection.    It is usually given right before your surgery.  If this is the case, you will be awake or sedated, but you should experience minimal pain during the procedure.    For some people, the injection may be given at the very end of your surgery.  It all depends on the type of surgery and your situation.    The procedure usually takes about 5-15 minutes.  An ultrasound machine will help the anesthesiologist insert it in the right place or the surgeon will inject it under direct vision.     A needle is used to place the numbing medication under your skin.  It provides pain relief by numbing the tissue in the area where your surgeon will make the incision.    What can I expect?    You may experience numbness, tingling, or a feeling of heaviness around the area that was injected.    If you experience any of the follow symptoms IMMEDIATELY CALL THE REGIONAL ANESTHESIA PAIN SERVICE:    Numbness or tingling occurs in areas other than around the injection site    Blurry vision    Ringing in your ears    A metallic taste in your mouth    PAGE: Dial [phone].  When prompted, enter the following 4-digit ID number:  [id number].  You will be prompted to enter your phone number; and then enter the # sign.  The clinician on call will call you back.    OR  CALL: Dial [phone].  Let the hospital  know that you are having a problem with a nerve block and that you would like to speak to the regional " anesthesia pain service right away.    You should not receive any other type of numbing medication within 4 days after receiving liposomal bupivacaine unless your anesthesiologist approves.    Post Operative Instructions: Regional Anesthetic with Liposomal Bupivacaine for Chest and Abdominal Surgery  General Information:   Regional anesthesia is when local anesthetic or  numbing  medication is injected around the nerves to anesthetize or  numb  the area supplied by that set of nerves.     Types of Regional Blocks:  Transversus Abdominis Plane (TAP): A block injected beneath the covering of a muscle layer of the abdomen for abdominal surgery  Pectoral: A block injected near the breast for surgery on the breast and armpit  Paravertebral: A block injected in the back for surgery on the chest, ribs, and breast    Procedure:  The type of anesthesia your doctor used to numb your chest or abdomen will usually not wear off for 24-48 hours, but may last as long as 72 hours.     Diet:  There are no restrictions on your diet. You should drink plenty of fluids.     Discomfort:  You will have a tingling and prickly sensation in your chest or abdomen as the feeling begins to return. You can also expect some discomfort. The amount of discomfort is unpredictable, but if you have more pain than can be controlled with pain medication you should notify your physician.     Pain Medicine:   Begin taking your oral pain pills before bedtime and during the night to avoid a sudden onset of pain as part of the block wears off.  Do not engage in drinking, driving, or hazardous occupations while taking pain medication.     Stitches:   You may have stitches or special skin closures. You doctor will inform you when to return to the office to have them removed.

## 2018-03-22 NOTE — ANESTHESIA CARE TRANSFER NOTE
Patient: Yola Amin    Procedure(s):  Bilateral Breast Reduction - Wound Class: I-Clean    Diagnosis: Breast Hypertrophy  Diagnosis Additional Information: No value filed.    Anesthesia Type:   General, LMA, Periph. Nerve Block for postop pain     Note:  Airway :Nasal Cannula  Patient transferred to:PACU  Comments: Arrive PACU, Stable, Airway Intact  136/99, 102,16,97%,98  All questions answered.  Handoff Report: Identifed the Patient, Identified the Reponsible Provider, Reviewed the pertinent medical history, Discussed the surgical course, Reviewed Intra-OP anesthesia mangement and issues during anesthesia, Set expectations for post-procedure period and Allowed opportunity for questions and acknowledgement of understanding      Vitals: (Last set prior to Anesthesia Care Transfer)    CRNA VITALS  3/22/2018 1126 - 3/22/2018 1159      3/22/2018             Resp Rate (set): 10                Electronically Signed By: KAJAL Narayan CRNA  March 22, 2018  11:59 AM

## 2018-03-27 LAB — COPATH REPORT: NORMAL

## 2018-03-29 ASSESSMENT — ENCOUNTER SYMPTOMS
MYALGIAS: 1
ARTHRALGIAS: 0
JOINT SWELLING: 0
NECK PAIN: 1
MUSCLE CRAMPS: 0
MUSCLE WEAKNESS: 0
STIFFNESS: 0
BACK PAIN: 1

## 2018-04-03 ENCOUNTER — OFFICE VISIT (OUTPATIENT)
Dept: PLASTIC SURGERY | Facility: CLINIC | Age: 58
End: 2018-04-03
Attending: PLASTIC SURGERY
Payer: COMMERCIAL

## 2018-04-03 VITALS
SYSTOLIC BLOOD PRESSURE: 120 MMHG | WEIGHT: 200.5 LBS | BODY MASS INDEX: 32.22 KG/M2 | TEMPERATURE: 97.7 F | OXYGEN SATURATION: 97 % | HEART RATE: 71 BPM | RESPIRATION RATE: 16 BRPM | DIASTOLIC BLOOD PRESSURE: 79 MMHG | HEIGHT: 66 IN

## 2018-04-03 DIAGNOSIS — N62 HYPERTROPHY OF BREAST: Primary | ICD-10-CM

## 2018-04-03 PROCEDURE — G0463 HOSPITAL OUTPT CLINIC VISIT: HCPCS | Mod: ZF

## 2018-04-03 ASSESSMENT — PAIN SCALES - GENERAL: PAINLEVEL: MODERATE PAIN (5)

## 2018-04-03 NOTE — PROGRESS NOTES
PRESENTING COMPLAINT:  Postoperative visit, status post cosmetic bilateral breast reduction done on 03/22/2018.      HISTORY OF PRESENT ILLNESS:  Ms. Amin is 57 years old.  She is about 10 days out from surgery and has done extremely well, very happy with the results, no issues.  Pathology was all benign.      PHYSICAL EXAMINATION:  Vital signs stable.  She is afebrile, in no obvious distress.  Wound is healing in well.  No evidence of infection, seroma or hematoma.  Breasts are aesthetic and symmetric, some bruising.      ASSESSMENT AND PLAN:  Based on above findings, a diagnosis of cosmetic breast reduction was made.  I have encouraged the patient to start moisturizing her incisions and allow everything to heal over the next few weeks and then mature over the next few months.  I will see her back in 3-4 weeks.  All exam done in the presence of my nurse.  She was happy with the visit.

## 2018-04-03 NOTE — LETTER
4/3/2018       RE: Yola Amin  [address]    [facility] [address]     Dear Colleague,    Thank you for referring your patient, Yola Amin, to the [facility] BREAST CENTER at [facility]. Please see a copy of my visit note below.    PRESENTING COMPLAINT:  Postoperative visit, status post cosmetic bilateral breast reduction done on 03/22/2018.      HISTORY OF PRESENT ILLNESS:  Ms. Amin is 57 years old.  She is about 10 days out from surgery and has done extremely well, very happy with the results, no issues.  Pathology was all benign.      PHYSICAL EXAMINATION:  Vital signs stable.  She is afebrile, in no obvious distress.  Wound is healing in well.  No evidence of infection, seroma or hematoma.  Breasts are aesthetic and symmetric, some bruising.      ASSESSMENT AND PLAN:  Based on above findings, a diagnosis of cosmetic breast reduction was made.  I have encouraged the patient to start moisturizing her incisions and allow everything to heal over the next few weeks and then mature over the next few months.  I will see her back in 3-4 weeks.  All exam done in the presence of my nurse.  She was happy with the visit.         Again, thank you for allowing me to participate in the care of your patient.      Sincerely,    AKIRA Vizcarra MD

## 2018-04-03 NOTE — NURSING NOTE
"Oncology Rooming Note    April 3, 2018 7:53 AM   Yola Amin is a 57 year old female who presents for:    Chief Complaint   Patient presents with     Oncology Clinic Visit     Return: Pondulous Breast     Initial Vitals: /79  Pulse 71  Temp 97.7  F (36.5  C) (Oral)  Resp 16  Ht 1.676 m (5' 6\")  Wt 90.9 kg (200 lb 8 oz)  LMP 02/01/2015  SpO2 97%  BMI 32.36 kg/m2 Estimated body mass index is 32.36 kg/(m^2) as calculated from the following:    Height as of this encounter: 1.676 m (5' 6\").    Weight as of this encounter: 90.9 kg (200 lb 8 oz). Body surface area is 2.06 meters squared.  Moderate Pain (5) Comment: Data Unavailable   Patient's last menstrual period was 02/01/2015.  Allergies reviewed: Yes  Medications reviewed: Yes    Medications: Medication refills not needed today.  Pharmacy name entered into [employer]: [id number] - [address]    Clinical concerns: no new concerns Dr. Vizcarra was NOT notified.    10 minutes for nursing intake (face to face time)     Priscilla Zhao CMA              "

## 2018-04-03 NOTE — MR AVS SNAPSHOT
After Visit Summary   4/3/2018    Yola Amin    MRN: [id number]           Patient Information     Date Of Birth          1960        Visit Information        Provider Department      4/3/2018 8:00 AM AKIRA Vizcarra MD [facility]        Today's Diagnoses     Hypertrophy of breast    -  1       Follow-ups after your visit        Follow-up notes from your care team     Return in about 4 weeks (around 5/1/2018).      Your next 10 appointments already scheduled     Apr 17, 2018 11:15 AM CDT   (Arrive by 11:00 AM)   Return Visit with AKIRA Vizcarra MD   [facility] ([facility])    [address]  [address]  [facility] [facility] [address] [phone]              Who to contact     If you have questions or need follow up information about today's clinic visit or your schedule please contact [facility] directly at [phone].  Normal or non-critical lab and imaging results will be communicated to you by [employer], letter or phone within 4 business days after the clinic has received the results. If you do not hear from us within 7 days, please contact the clinic through N-Sidedt or phone. If you have a critical or abnormal lab result, we will notify you by phone as soon as possible.  Submit refill requests through [employer] or call your pharmacy and they will forward the refill request to us. Please allow 3 business days for your refill to be completed.          Additional Information About Your Visit        MyChart Information     [employer] gives you secure access to your electronic health record. If you see a primary care provider, you can also send messages to your care team and make appointments. If you have questions, please call your primary care clinic.  If you do not have a primary care provider, please call [phone] and they will assist you.        Care EveryWhere ID     This is your Care EveryWhere ID. This could  "be used by other organizations to access your [place] medical records  [id number]        Your Vitals Were     Pulse Temperature Respirations Height Last Period Pulse Oximetry    71 97.7  F (36.5  C) (Oral) 16 5' 6\" 02/01/2015 97%    BMI (Body Mass Index)                   32.36 kg/m2            Blood Pressure from Last 3 Encounters:   04/03/18 120/79   03/22/18 (!) 140/91   03/08/18 112/77    Weight from Last 3 Encounters:   04/03/18 200 lb 8 oz   03/22/18 205 lb 12.8 oz   03/08/18 205 lb 8 oz              Today, you had the following     No orders found for display       Primary Care Provider Office Phone # Fax #    Falguni Kasper PA-C [phone]       [address] [place] [address]        Equal Access to Services     [facility]: Hadii tae collins Sorebecca, waaxda lujayla, qaybta kaalmada sami, jerod ren . So [facility] [phone].    ATENCIÓN: Si habla español, tiene a augustine disposición servicios gratuitos de asistencia lingüística. Jayson al [phone].    We comply with applicable federal civil rights laws and [place] laws. We do not discriminate on the basis of race, color, national origin, age, disability, sex, sexual orientation, or gender identity.            Thank you!     Thank you for choosing [facility]  for your care. Our goal is always to provide you with excellent care. Hearing back from our patients is one way we can continue to improve our services. Please take a few minutes to complete the written survey that you may receive in the mail after your visit with us. Thank you!             Your Updated Medication List - Protect others around you: Learn how to safely use, store and throw away your medicines at www.disposemymeds.org.          This list is accurate as of 4/3/18 10:13 AM.  Always use your most recent med list.                   Brand Name Dispense Instructions for use Diagnosis    fexofenadine 180 MG tablet "    ALLEGRA     Take 1 tablet by mouth daily.        GLUCOSAMINE CHONDR COMPLEX PO      Take 1 capsule by mouth daily        IBUPROFEN PO      Take 200 mg by mouth every 4 hours as needed for moderate pain        multivitamin per tablet      Take 1 tablet by mouth daily.

## 2018-04-13 ENCOUNTER — CARE COORDINATION (OUTPATIENT)
Dept: PLASTIC SURGERY | Facility: CLINIC | Age: 58
End: 2018-04-13

## 2018-04-13 ENCOUNTER — OFFICE VISIT (OUTPATIENT)
Dept: SURGERY | Facility: CLINIC | Age: 58
End: 2018-04-13
Payer: COMMERCIAL

## 2018-04-13 DIAGNOSIS — Z98.890 S/P BILATERAL BREAST REDUCTION: Primary | ICD-10-CM

## 2018-04-13 PROCEDURE — 99024 POSTOP FOLLOW-UP VISIT: CPT | Performed by: PLASTIC SURGERY

## 2018-04-13 NOTE — PROGRESS NOTES
PRESENTING COMPLAINT:  Postoperative visit, status post cosmetic bilateral breast reduction on 03/22/2018.      HISTORY OF PRESENT ILLNESS:  Ms. Amin is 57 years old.  She is about 3 weeks out from surgery.  Got a phone call that she was having some drainage from her left breast and wanted to be seen.  Had her come in today.  Denies any fevers, minimal pain, more on the left than on the right and feels well.      PHYSICAL EXAMINATION:  On exam vital signs stable.  She is afebrile in no obvious distress.  Her right breast is healed.  Left breast is healing well.  No evidence for obvious cellulitis.  There is a small T-junction site less than 1 cm opening superficially at the inframammary fold area as well as a free nipple to vertical scar junction.      ASSESSMENT AND PLAN:  Based on above findings, a diagnosis of bilateral cosmetic breast reduction was made.  She has small superficial T-junction site skin openings but no obvious cellulitis at this time.  I reassured the patient.  Had her continue to monitor it very closely over the weekend.  If there are any signs of redness, firmness, swelling or increasing pain or fevers, she will call me, otherwise I will see her on Tuesday.  All questions were answered.  She was seen in the presence of a chaperone.

## 2018-04-13 NOTE — NURSING NOTE
"Yola HALLIE Amin's goals for this visit include: possible breast infection  She requests these members of her care team be copied on today's visit information: no    PCP: Falguni Kasper    Referring Provider:  No referring provider defined for this encounter.    Chief Complaint   Patient presents with     RECHECK     possible breast infection       Initial LMP 02/01/2015 Estimated body mass index is 32.36 kg/(m^2) as calculated from the following:    Height as of 4/3/18: 1.676 m (5' 6\").    Weight as of 4/3/18: 90.9 kg (200 lb 8 oz).  Medication Reconciliation: complete    Do you need any medication refills at today's visit? No    Isabel Antoine LPN      "

## 2018-04-13 NOTE — PROGRESS NOTES
Pt is s/p breast reduction on 3/22/18.  She called this am stating that incision on left breast has redness, increased pain, and purulent drainage.  Message sent to Dr. Vizcarra and  clinic to see if they can add pt on to Dr. Vizcarra's schedule today.

## 2018-04-17 ENCOUNTER — OFFICE VISIT (OUTPATIENT)
Dept: PLASTIC SURGERY | Facility: CLINIC | Age: 58
End: 2018-04-17
Attending: PLASTIC SURGERY
Payer: COMMERCIAL

## 2018-04-17 DIAGNOSIS — Z98.890 S/P BILATERAL BREAST REDUCTION: Primary | ICD-10-CM

## 2018-04-17 NOTE — LETTER
4/17/2018       RE: Yola Amin  [address]    [place] [facility] [address]     Dear Colleague,    Thank you for referring your patient, Yola Amin, to the [facility] BREAST CENTER at [facility]. Please see a copy of my visit note below.    PRESENTING COMPLAINT:  Postoperative visit, status post cosmetic bilateral breast reduction on 03/22/2018.       HISTORY OF PRESENT ILLNESS:  Ms. Amin is 57 years old.  She is about 3 weeks out from surgery.   Feels well and has healed in.       PHYSICAL EXAMINATION:  On exam vital signs stable.  She is afebrile in no obvious distress.  Her right and left breasts have healed.       ASSESSMENT AND PLAN:  Based on above findings, a diagnosis of bilateral cosmetic breast reduction was made. Advised to get to know her breasts well with exams. Mammograms yearly. All questions were answered.  She was seen in the presence of a chaperone.     Again, thank you for allowing me to participate in the care of your patient.      Sincerely,    AKIRA Vizcarra MD

## 2018-04-17 NOTE — PROGRESS NOTES
PRESENTING COMPLAINT:  Postoperative visit, status post cosmetic bilateral breast reduction on 03/22/2018.       HISTORY OF PRESENT ILLNESS:  Ms. Amin is 57 years old.  She is about 3 weeks out from surgery.  Feels well and has healed in.       PHYSICAL EXAMINATION:  On exam vital signs stable.  She is afebrile in no obvious distress.  Her right and left breasts have healed.       ASSESSMENT AND PLAN:  Based on above findings, a diagnosis of bilateral cosmetic breast reduction was made. Advised to get to know her breasts well with exams. Mammograms yearly. All questions were answered.  She was seen in the presence of a chaperone.

## 2018-04-17 NOTE — MR AVS SNAPSHOT
After Visit Summary   4/17/2018    Yola Amin    MRN: [id number]           Patient Information     Date Of Birth          1960        Visit Information        Provider Department      4/17/2018 11:15 AM AKIRA Vizcarra MD [facility]        Today's Diagnoses     S/P bilateral breast reduction    -  1       Follow-ups after your visit        Follow-up notes from your care team     Return in about 1 week (around 4/24/2018).      Who to contact     If you have questions or need follow up information about today's clinic visit or your schedule please contact [facility] directly at [phone].  Normal or non-critical lab and imaging results will be communicated to you by MyChart, letter or phone within 4 business days after the clinic has received the results. If you do not hear from us within 7 days, please contact the clinic through LIQVIDhart or phone. If you have a critical or abnormal lab result, we will notify you by phone as soon as possible.  Submit refill requests through [employer] or call your pharmacy and they will forward the refill request to us. Please allow 3 business days for your refill to be completed.          Additional Information About Your Visit        MyChart Information     [employer] gives you secure access to your electronic health record. If you see a primary care provider, you can also send messages to your care team and make appointments. If you have questions, please call your primary care clinic.  If you do not have a primary care provider, please call [phone] and they will assist you.        Care EveryWhere ID     This is your Care EveryWhere ID. This could be used by other organizations to access your [place] medical records  [id number]        Your Vitals Were     Last Period                   02/01/2015            Blood Pressure from Last 3 Encounters:   04/03/18 120/79   03/22/18 (!) 140/91   03/08/18 112/77    Weight from Last 3  Encounters:   04/03/18 200 lb 8 oz   03/22/18 205 lb 12.8 oz   03/08/18 205 lb 8 oz              Today, you had the following     No orders found for display       Primary Care Provider Office Phone # Fax #    Falguni Kasper PA-C [phone]       [address] [place] [address]        Equal Access to Services     [facility] : Hadii aad ku hadasho Soomaali, waaxda luqadaha, qaybta kaalmada adeegyada, waxay idiin hayaan adeeg yonatanxochitllizzy ren . So [facility] [phone].    ATENCIÓN: Si habla español, tiene a augustine disposición servicios gratuitos de asistencia lingüística. Jayson al [phone].    We comply with applicable federal civil rights laws and [place] laws. We do not discriminate on the basis of race, color, national origin, age, disability, sex, sexual orientation, or gender identity.            Thank you!     Thank you for choosing [facility]  for your care. Our goal is always to provide you with excellent care. Hearing back from our patients is one way we can continue to improve our services. Please take a few minutes to complete the written survey that you may receive in the mail after your visit with us. Thank you!             Your Updated Medication List - Protect others around you: Learn how to safely use, store and throw away your medicines at www.disposemymeds.org.          This list is accurate as of 4/17/18  4:37 PM.  Always use your most recent med list.                   Brand Name Dispense Instructions for use Diagnosis    fexofenadine 180 MG tablet    ALLEGRA     Take 1 tablet by mouth daily.        GLUCOSAMINE CHONDR COMPLEX PO      Take 1 capsule by mouth daily        IBUPROFEN PO      Take 200 mg by mouth every 4 hours as needed for moderate pain        multivitamin per tablet      Take 1 tablet by mouth daily.

## 2018-06-15 ENCOUNTER — TELEPHONE (OUTPATIENT)
Dept: PLASTIC SURGERY | Facility: CLINIC | Age: 58
End: 2018-06-15

## 2018-06-15 NOTE — TELEPHONE ENCOUNTER
Spoke with patient. She has a very hard and painful left breast which has been that way since surgery but not getting better. Since this is not a new problem I will asj schedulers to get her on sooner then 7/10 but since dr Vizcarra is on vaction July 10 might be the first available.  Pt is ok with this plan. Rosa Elena Gold    [facility] Call Center    Phone Message    May a detailed message be left on voicemail: yes    Reason for Call: Other: Pt has concerns about her L breast. She states it's very hard and she is wanting to discuss that to see if she needs a sooner appt or if her appt on 7/10 is okay.     Action Taken: Message routed to:  [facility] [employer]

## 2018-07-09 ASSESSMENT — ENCOUNTER SYMPTOMS
BREAST PAIN: 1
BREAST MASS: 1

## 2018-07-10 ENCOUNTER — OFFICE VISIT (OUTPATIENT)
Dept: PLASTIC SURGERY | Facility: CLINIC | Age: 58
End: 2018-07-10
Attending: PLASTIC SURGERY
Payer: COMMERCIAL

## 2018-07-10 DIAGNOSIS — Z98.890 S/P BILATERAL BREAST REDUCTION: Primary | ICD-10-CM

## 2018-07-10 PROCEDURE — G0463 HOSPITAL OUTPT CLINIC VISIT: HCPCS | Mod: ZF

## 2018-07-10 NOTE — MR AVS SNAPSHOT
After Visit Summary   7/10/2018    Yola Amin    MRN: [id number]           Patient Information     Date Of Birth          1960        Visit Information        Provider Department      7/10/2018 9:00 AM AKIRA Vizcarra MD [facility]        Today's Diagnoses     S/P bilateral breast reduction    -  1       Follow-ups after your visit        Follow-up notes from your care team     Return in about 6 months (around 1/10/2019).      Who to contact     If you have questions or need follow up information about today's clinic visit or your schedule please contact [facility] directly at [phone].  Normal or non-critical lab and imaging results will be communicated to you by MyChart, letter or phone within 4 business days after the clinic has received the results. If you do not hear from us within 7 days, please contact the clinic through Heckylhart or phone. If you have a critical or abnormal lab result, we will notify you by phone as soon as possible.  Submit refill requests through [employer] or call your pharmacy and they will forward the refill request to us. Please allow 3 business days for your refill to be completed.          Additional Information About Your Visit        MyChart Information     [employer] gives you secure access to your electronic health record. If you see a primary care provider, you can also send messages to your care team and make appointments. If you have questions, please call your primary care clinic.  If you do not have a primary care provider, please call [phone] and they will assist you.        Care EveryWhere ID     This is your Care EveryWhere ID. This could be used by other organizations to access your [place] medical records  [id number]        Your Vitals Were     Last Period                   02/01/2015            Blood Pressure from Last 3 Encounters:   04/03/18 120/79   03/22/18 (!) 140/91   03/08/18 112/77    Weight from Last  3 Encounters:   04/03/18 200 lb 8 oz   03/22/18 205 lb 12.8 oz   03/08/18 205 lb 8 oz              Today, you had the following     No orders found for display       Primary Care Provider Office Phone # Fax #    Falguni Kasper PA-C [phone]       [address] [place] [address]        Equal Access to Services     [facility] : Hadii aad ku hadasho Soomaali, waaxda luqadaha, qaybta kaalmada adeegyada, waxay idiin hayaan aderose mary tamxochitllizzy ren . So [facility] [phone].    ATENCIÓN: Si habla espdaniela, tiene a augustine disposición servicios gratuitos de asistencia lingüística. Jayson al [phone].    We comply with applicable federal civil rights laws and [place] laws. We do not discriminate on the basis of race, color, national origin, age, disability, sex, sexual orientation, or gender identity.            Thank you!     Thank you for choosing [facility]  for your care. Our goal is always to provide you with excellent care. Hearing back from our patients is one way we can continue to improve our services. Please take a few minutes to complete the written survey that you may receive in the mail after your visit with us. Thank you!             Your Updated Medication List - Protect others around you: Learn how to safely use, store and throw away your medicines at www.disposemymeds.org.          This list is accurate as of 7/10/18  3:30 PM.  Always use your most recent med list.                   Brand Name Dispense Instructions for use Diagnosis    fexofenadine 180 MG tablet    ALLEGRA     Take 1 tablet by mouth daily.        GLUCOSAMINE CHONDR COMPLEX PO      Take 1 capsule by mouth daily        IBUPROFEN PO      Take 200 mg by mouth every 4 hours as needed for moderate pain        multivitamin per tablet      Take 1 tablet by mouth daily.

## 2018-07-10 NOTE — LETTER
7/10/2018     RE: Yola Amin  [address]  [address]  [facility] [address]     Dear Colleague,    Thank you for referring your patient, Yola Amin, to the [facility] BREAST CENTER at [facility]. Please see a copy of my visit note below.    PRESENTING COMPLAINT:  Postoperative visit, status post cosmetic bilateral breast reduction on 03/22/2018.       HISTORY OF PRESENT ILLNESS:  Ms. Amin is 57 years old.  She is about 4 months out from surgery.  Feels well and has healed in. Noticed a lump in the left breast that has been there since surgery and is slightly smaller and less tender now.       PHYSICAL EXAMINATION:  On exam vital signs stable.  She is afebrile in no obvious distress.  Her right and left breasts have healed. There is a 3x3 cm firmness in the left lateral area consistent with fat necrosis. No tenderness. No external depression or indentation.      ASSESSMENT AND PLAN:  Based on above findings, a diagnosis of bilateral cosmetic breast reduction was made. She has some fat necrosis on the left. Described the pathophysiology. Advised time and massage. Nothing to do as long as it is stable or smaller with time and no aesthetic concerns. Advised to get to know her breasts well with exams. Mammograms yearly. RTC 6 months or if things change. All questions were answered.  She was seen in the presence of a chaperone.     Again, thank you for allowing me to participate in the care of your patient.      Sincerely,    AKIRA Vizcarra MD

## 2018-07-10 NOTE — PROGRESS NOTES
PRESENTING COMPLAINT:  Postoperative visit, status post cosmetic bilateral breast reduction on 03/22/2018.       HISTORY OF PRESENT ILLNESS:  Ms. Amin is 57 years old.  She is about 4 months out from surgery.  Feels well and has healed in. Noticed a lump in the left breast that has been there since surgery and is slightly smaller and less tender now.       PHYSICAL EXAMINATION:  On exam vital signs stable.  She is afebrile in no obvious distress.  Her right and left breasts have healed. There is a 3x3 cm firmness in the left lateral area consistent with fat necrosis. No tenderness. No external depression or indentation.      ASSESSMENT AND PLAN:  Based on above findings, a diagnosis of bilateral cosmetic breast reduction was made. She has some fat necrosis on the left. Described the pathophysiology. Advised time and massage. Nothing to do as long as it is stable or smaller with time and no aesthetic concerns. Advised to get to know her breasts well with exams. Mammograms yearly. RTC 6 months or if things change. All questions were answered.  She was seen in the presence of a chaperone.

## 2018-07-10 NOTE — NURSING NOTE
"Oncology Rooming Note    July 10, 2018 9:09 AM   Yola Amin is a 58 year old female who presents for:    Chief Complaint   Patient presents with     Oncology Clinic Visit     return - concerns left breast      Initial Vitals: LMP 02/01/2015 Estimated body mass index is 32.36 kg/(m^2) as calculated from the following:    Height as of 4/3/18: 1.676 m (5' 6\").    Weight as of 4/3/18: 90.9 kg (200 lb 8 oz). There is no height or weight on file to calculate BSA.  Data Unavailable Comment: Data Unavailable   Patient's last menstrual period was 02/01/2015.  Allergies reviewed: Yes  Medications reviewed: Yes    Medications: Medication refills not needed today.  Pharmacy name entered into [facility]: [facility] #[id number] - [address]    Clinical concerns: left breast      6 minutes for nursing intake (face to face time)     Alejandra Zepeda CMA            "

## 2018-09-04 ENCOUNTER — VIRTUAL VISIT (OUTPATIENT)
Dept: FAMILY MEDICINE | Facility: OTHER | Age: 58
End: 2018-09-04

## 2018-09-04 NOTE — PROGRESS NOTES
"Date:   Clinician: Antonio Mendoza  Clinician NPI: [id number]  Patient: Yola Amin  Patient : 1960  Patient Address: [address] [place] [address] [address], [place] [address]  Patient Phone: [phone]  Visit Protocol: Ear pain  Patient Summary:  Yola is a 58 year old ( : 1960 ) female who initiated a Visit for swimmer's ear (ear pain). When asked the question \"Please sign me up to receive news, health information and promotions. \", Yola responded \"No\".    Yola uploaded images of her ear(s).   Yola reports that her ear pain started more than 7 days ago. The ear pain is located outside (external surface) and inside the left ear.   In addition to the ear pain, Yola is experiencing a feeling of fullness, itchiness, and tenderness in the ear(s). Her ear(s) is tender to the touch on the tragus and ear canal. Yola reports having fluid draining from the ear(s).   Symptom Details     Pain: Yola is experiencing moderate pain. It gets worse when she gently pulls on the earlobe(s). It does not get worse when she eats or chews.     Drainage: The color of the fluid coming out of her ear(s) is white. The fluid does not have a bad odor.      Yola denies redness in the ear(s). She also denies feeling feverish, recent injuries near the ear(s), ever having ear tubes, and the possibility of a foreign object in the ear(s). Yola denies flying and swimming within the past week.    Weight: 200 lbs   She denies pregnancy and denies breastfeeding. She does not menstruate.   She does not smoke or use smokeless tobacco.   Additional health information pertinent to this Visit as reported by the patient (free text): i need to take a flight 5 days from now   MEDICATIONS: No current medications, ALLERGIES: NKDA  Clinician Response:  Dear Yola,  Based upon the information you provided, you may have otitis externa. External otitis, also known as swimmer's ear, is a condition that occurs when the " ear canal becomes irritated or infected.   I am prescribing:   Neomycin-polymyxin-hydrocortisone 1% otic ear drops. Instill 4 drops into affected ear(s) 3 times per day for 7 days. There are no refills with this prescription.   Instructions for using eardrops:     Lie on your side or tilt your head    Insert the drops into your ear canal    Lie on your side or insert a cotton ball in the ear canal for 5 minutes    Use the medication for the number of days recommended, even if you begin to feel better within a few days after starting the drops     Avoid getting water inside your ear while you are being treated for swimmer's ear.  Use a cotton ball coated with petroleum jelly to protect your ears when bathing and showering.  Do not go swimming or diving for the next 7-10 days.  Do not wear headphones or hearing aid in the infected ears until your symptoms improve.  Please see your healthcare provider or urgent care clinic if your symptoms do not improve within 2 days.   Diagnosis: Otitis externa  Diagnosis ICD: H60.399  Prescription: neomycin-polymyxin-HC 3.5-10,000-1 mg/mL-unit/mL-% otic (ear) drops,suspension 1 10 ml dropper bottle, 7 days supply. Instill 4 drops into affected ear(s) 3 times per day for 7 days. Refills: 0, Refill as needed: no, Allow substitutions: yes  Pharmacy: [facility] - [phone] - [address] [address]

## 2018-10-22 ENCOUNTER — OFFICE VISIT (OUTPATIENT)
Dept: FAMILY MEDICINE | Facility: CLINIC | Age: 58
End: 2018-10-22
Payer: COMMERCIAL

## 2018-10-22 VITALS
TEMPERATURE: 98.6 F | WEIGHT: 207 LBS | DIASTOLIC BLOOD PRESSURE: 86 MMHG | RESPIRATION RATE: 16 BRPM | HEIGHT: 65 IN | OXYGEN SATURATION: 97 % | SYSTOLIC BLOOD PRESSURE: 134 MMHG | HEART RATE: 84 BPM | BODY MASS INDEX: 34.49 KG/M2

## 2018-10-22 DIAGNOSIS — H92.02 LEFT EAR PAIN: Primary | ICD-10-CM

## 2018-10-22 DIAGNOSIS — H61.302 ACQUIRED STENOSIS OF LEFT EXTERNAL EAR CANAL, UNSPECIFIED: ICD-10-CM

## 2018-10-22 RX ORDER — FLUTICASONE PROPIONATE 50 MCG
1-2 SPRAY, SUSPENSION (ML) NASAL DAILY
Qty: 1 BOTTLE | Refills: 11 | Status: SHIPPED | OUTPATIENT
Start: 2018-10-22 | End: 2022-08-19

## 2018-10-22 RX ORDER — FEXOFENADINE HCL AND PSEUDOEPHEDRINE HCL 180; 240 MG/1; MG/1
1 TABLET, EXTENDED RELEASE ORAL DAILY
Qty: 14 TABLET | Refills: 1 | Status: SHIPPED | OUTPATIENT
Start: 2018-10-22

## 2018-10-22 ASSESSMENT — ENCOUNTER SYMPTOMS
DIZZINESS: 0
RHINORRHEA: 1
TROUBLE SWALLOWING: 0
CHILLS: 0
HEADACHES: 0
LIGHT-HEADEDNESS: 0
EYE PAIN: 0
SINUS PRESSURE: 0
VOICE CHANGE: 0
FACIAL SWELLING: 0
SORE THROAT: 0
EYE DISCHARGE: 0
FACIAL ASYMMETRY: 0
SINUS PAIN: 0
FATIGUE: 0
FEVER: 0
EYE ITCHING: 0

## 2018-10-22 NOTE — NURSING NOTE
"58 year old  Chief Complaint   Patient presents with     Otalgia     Pt. presents to the clinic with Left ear pain X 2 months, drainage off and on, ringing. No hearing changes.        Blood pressure 149/87, pulse 84, temperature 98.6  F (37  C), temperature source Oral, resp. rate 16, height 5' 4.9\" (164.8 cm), weight 207 lb (93.9 kg), last menstrual period 02/01/2015, SpO2 97 %, not currently breastfeeding. Body mass index is 34.55 kg/(m^2).  BP completed using cuff size:    Patient Active Problem List   Diagnosis     Allergic rhinitis     Elevated blood pressure reading without diagnosis of hypertension     Hypertrophy of breast     Chronic neck and back pain     Pendulous breast     S/P bilateral breast reduction       Wt Readings from Last 2 Encounters:   10/22/18 207 lb (93.9 kg)   04/03/18 200 lb 8 oz (90.9 kg)     BP Readings from Last 3 Encounters:   10/22/18 149/87   04/03/18 120/79   03/22/18 (!) 140/91       No Known Allergies    Current Outpatient Prescriptions   Medication     fexofenadine (ALLEGRA) 180 MG tablet     Glucosamine-Chondroitin (GLUCOSAMINE CHONDR COMPLEX PO)     IBUPROFEN PO     Multiple Vitamin (MULTIVITAMIN) per tablet     No current facility-administered medications for this visit.        Social History   Substance Use Topics     Smoking status: Never Smoker     Smokeless tobacco: Never Used     Alcohol use 1.8 oz/week     3 Standard drinks or equivalent per week         Honoring Choices - Health Care Directive Guide offered to patient at time of visit.    Health Maintenance Due   Topic Date Due     ADVANCE DIRECTIVE PLANNING Q5 YRS  04/28/2016     INFLUENZA VACCINE (1) 09/01/2018     LIPID MONITORING Q6 MO  09/08/2018       Immunization History   Administered Date(s) Administered     HEPA 04/24/1997, 01/21/1999     HepB 04/24/1997, 05/22/1997, 01/21/1999     Influenza (IIV3) PF 10/01/2011, 09/21/2014     Poliovirus, inactivated (IPV) 04/24/1997     TD (ADULT, 7+) 05/22/1997     Tdap " (Adacel,Boostrix) 04/14/2009     Typhoid IM 04/27/1997       Lab Results   Component Value Date    PAP NIL 12/21/2017         Recent Labs   Lab Test  03/08/18   0935  10/24/17   0923  10/24/17   0854  05/13/15   1037   09/28/12   1704   A1C   --    --   5.6   --    --    --    LDL  122.0  178*   --   114   < >   --    HDL  59.0  59   --   73   < >   --    TRIG  86.0  110   --   61   < >   --    ALT  23.0   --    --    --    --    --    CR   --   0.90   --    --    --    --    GFRESTIMATED   --   65   --    --    --    --    GFRESTBLACK   --   78   --    --    --    --    POTASSIUM   --   4.9   --    --    --    --    TSH   --   1.64   --    --    --   2.38    < > = values in this interval not displayed.       PHQ-2 ( 1999 Pfizer) 10/22/2018 12/21/2017   Q1: Little interest or pleasure in doing things 0 0   Q2: Feeling down, depressed or hopeless 0 0   PHQ-2 Score 0 0       PHQ-9 SCORE 5/15/2013   Total Score 1       No flowsheet data found.    No flowsheet data found.    Tiffanie Shirley CMA  October 22, 2018 1:57 PM

## 2018-10-22 NOTE — MR AVS SNAPSHOT
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     If anything gets worse or changes.              Follow-ups after your visit        Additional Services     OTOLARYNGOLOGY REFERRAL       Your provider has referred you to: [facility]: [facility] (Otolaryngology) - [place] [phone]  [email or web address]    Please be aware that coverage of these services is subject to the terms and limitations of your health insurance plan.  Call member services at your health plan with any benefit or coverage questions.      Please bring the following with you to your appointment:    (1) Any X-Rays, CTs or MRIs which have been performed.  Contact the facility where they were done to arrange for  prior to your scheduled appointment.   (2) List of current medications  (3) This referral request   (4) Any documents/labs given to you for this referral                  Your next 10 appointments already scheduled     Jan 08, 2019  9:00 AM CST   (Arrive by 8:45 AM)   Return Visit with AKIRA Vizcarra MD   [facility] ([facility])    [address] [place] [address]  [address]  [facility] [address] [phone]              Who to contact     Please call your clinic at [phone] to:    Ask questions about your health    Make or cancel appointments    Discuss your medicines    Learn about your test results    Speak to your doctor            Additional Information About Your Visit        MyChart Information     MyChart gives you  "secure access to your electronic health record. If you see a primary care provider, you can also send messages to your care team and make appointments. If you have questions, please call your primary care clinic.  If you do not have a primary care provider, please call [phone] and they will assist you.      [employer] is an electronic gateway that provides easy, online access to your medical records. With [employer] [facility], you can request a clinic appointment, read your test results, renew a prescription or communicate with your care team.     To access your existing account, please contact your [facility] or call [phone] for assistance.        Care EveryWhere ID     This is your Care EveryWhere ID. This could be used by other organizations to access your [facility] medical records  [id number]        Your Vitals Were     Pulse Temperature Respirations Height Last Period Pulse Oximetry    84 98.6  F (37  C) (Oral) 16 5' 4.9\" (164.8 cm) 02/01/2015 97%    BMI (Body Mass Index)                   34.55 kg/m2            Blood Pressure from Last 3 Encounters:   10/22/18 134/86   04/03/18 120/79   03/22/18 (!) 140/91    Weight from Last 3 Encounters:   10/22/18 207 lb (93.9 kg)   04/03/18 200 lb 8 oz (90.9 kg)   03/22/18 205 lb 12.8 oz (93.4 kg)              We Performed the Following     OTOLARYNGOLOGY REFERRAL          Today's Medication Changes          These changes are accurate as of 10/22/18  2:20 PM.  If you have any questions, ask your nurse or doctor.               Start taking these medicines.        Dose/Directions    fexofenadine-pseudoePHEDrine 180-240 MG per 24 hr tablet   Commonly known as:  ALLEGRA-D 24   Used for:  Left ear pain   Started by:  Karen Chino APRN CNP        Dose:  1 tablet   Take 1 tablet by mouth daily   Quantity:  14 tablet   Refills:  1       fluticasone 50 MCG/ACT spray   Commonly known as:  FLONASE   Used for:  Left ear pain, Acquired stenosis of left " external ear canal, unspecified   Started by:  Karen Chino APRN CNP        Dose:  1-2 spray   Spray 1-2 sprays into both nostrils daily   Quantity:  1 Bottle   Refills:  11         Stop taking these medicines if you haven't already. Please contact your care team if you have questions.     fexofenadine 180 MG tablet   Commonly known as:  ALLEGRA   Stopped by:  Karen Chino APRN CNP                Where to get your medicines      These medications were [id number] - [address] [address]     Phone:  [phone]     fluticasone 50 MCG/ACT spray         Some of these will need a paper prescription and others can be bought over the counter.  Ask your nurse if you have questions.     Bring a paper prescription for each of these medications     fexofenadine-pseudoePHEDrine 180-240 MG per 24 hr tablet                Primary Care Provider Office Phone # Fax #    Falguni Kasper PA-C [phone]       [address] [place] [address]        Equal Access to Services     [facility]: Hadii tae bruce hadashlito Sorebecca, waaxda luqadaha, qaybta kaalmaapoorva gallardo, jerod stein. So [facility] [phone].    ATENCIÓN: Si habla español, tiene a augustine disposición servicios gratuitos de asistencia lingüística. [facility] [phone].    We comply with applicable federal civil rights laws and [place] laws. We do not discriminate on the basis of race, color, national origin, age, disability, sex, sexual orientation, or gender identity.            Thank you!     Thank you for choosing [facility]  for your care. Our goal is always to provide you with excellent care. Hearing back from our patients is one way we can continue to improve our services. Please take a few minutes to complete the written survey that you may receive in the mail after your visit with us. Thank you!              Your Updated Medication List - Protect others around you: Learn how to safely use, store and throw away your medicines at www.disposemymeds.org.          This list is accurate as of 10/22/18  2:20 PM.  Always use your most recent med list.                   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

## 2018-10-22 NOTE — PROGRESS NOTES
"       HPI       Yola Amin is a 58 year old  who presents for   Chief Complaint   Patient presents with     Otalgia     Pt. presents to the clinic with Left ear pain X 2 months, drainage off and on, ringing. No hearing changes.      Has had left sided ear pain for the past 2 months. Has done a Fredi DAWN visit and was given corticosporin drops with no improvement. Has never had previously. Has had some intermittent ear drinage.     Does have chronic allergies and is on allegra daily. Daniel been having ongoing left nostril discharge and \"crusties\" which started around the same time as the ear discomfort. Patient denies any hearing loss or dizziness. No headaches or mastoid tenderness. No sinus pain, congestion, rhinorrhea, sore throat, or issues swallowing. Believes that drainage is mostly clear in color. No fevers, eye drainage, or any other associated symptoms. Has been told previouls that he rleft ear has a \"weird\" shape to it.     Problem, Medication and Allergy Lists were       Current Outpatient Prescriptions   Medication Sig Dispense Refill     fexofenadine-pseudoePHEDrine (ALLEGRA-D 24) 180-240 MG per 24 hr tablet Take 1 tablet by mouth daily 14 tablet 1     fluticasone (FLONASE) 50 MCG/ACT spray Spray 1-2 sprays into both nostrils daily 1 Bottle 11     Glucosamine-Chondroitin (GLUCOSAMINE CHONDR COMPLEX PO) Take 1 capsule by mouth daily        IBUPROFEN PO Take 200 mg by mouth every 4 hours as needed for moderate pain       Multiple Vitamin (MULTIVITAMIN) per tablet Take 1 tablet by mouth daily.         No Known Allergies.    Patient is a new patient to this clinic and so  I reviewed/updated the Past Medical History, the Family History and the Social History .         Review of Systems:   Review of Systems   Constitutional: Negative for chills, fatigue and fever.   HENT: Positive for ear discharge, ear pain and rhinorrhea. Negative for congestion, facial swelling, hearing loss, nosebleeds, postnasal " "drip, sinus pain, sinus pressure, sneezing, sore throat, tinnitus, trouble swallowing and voice change.    Eyes: Negative for pain, discharge and itching.   Allergic/Immunologic: Positive for environmental allergies.   Neurological: Negative for dizziness, facial asymmetry, light-headedness and headaches.     See under HPI for additional ROS       Physical Exam:     Vitals:    10/22/18 1356 10/22/18 1410   BP: 149/87 134/86   BP Location: Left arm Right arm   Patient Position: Chair    Cuff Size: Adult Regular    Pulse: 84    Resp: 16    Temp: 98.6  F (37  C)    TempSrc: Oral    SpO2: 97%    Weight: 207 lb (93.9 kg)    Height: 5' 4.9\" (164.8 cm)      Body mass index is 34.55 kg/(m^2).  Vitals were reviewed and were normal     Physical Exam   Constitutional: She is oriented to person, place, and time. She appears well-developed and well-nourished. No distress.   HENT:   Head: Normocephalic and atraumatic.   Right Ear: Hearing, tympanic membrane, external ear and ear canal normal.   Left Ear: Hearing normal. There is swelling. Tympanic membrane is scarred.   Nose: Nose normal. Right sinus exhibits no maxillary sinus tenderness and no frontal sinus tenderness. Left sinus exhibits no maxillary sinus tenderness and no frontal sinus tenderness.   Mouth/Throat: Uvula is midline, oropharynx is clear and moist and mucous membranes are normal. No oropharyngeal exudate.   Left inner ear canal appears swollen and immovable. Was unable to visualize entire left TM - only able to see roughly 12 o'clock to 4 o'clock with noted scar tissue at 1-2 o'clock. No erythema, bulging, retraction, injection, infection, or foreign body noted with visible area.    Eyes: Conjunctivae are normal. Right eye exhibits no discharge. Left eye exhibits no discharge. No scleral icterus.   Neck: Normal range of motion. Neck supple. No thyromegaly present.   Cardiovascular: Normal rate, regular rhythm and normal heart sounds.  Exam reveals no gallop and " no friction rub.    No murmur heard.  Pulmonary/Chest: Effort normal and breath sounds normal. No respiratory distress. She has no wheezes. She has no rales. She exhibits no tenderness.   Lymphadenopathy:     She has cervical adenopathy.   Neurological: She is alert and oriented to person, place, and time.   Skin: Skin is warm and dry. No rash noted. She is not diaphoretic. No erythema. No pallor.   Psychiatric: She has a normal mood and affect. Her behavior is normal.   Vitals reviewed.        Results:   No testing ordered today    Assessment and Plan        1. Left ear pain  Discussed with patient with inability to visualize entire TM and reports of clear ear drainage, cannot prescribe additional medication in drop form to reduce swelling of canal with concern of possible perforation of TM. Without any visibile signs of infection, antibiotic pharmacotherapy not indicated at this time. Advised temporary change in allergy medication to decongestant and flonase and follow up with ENT for additional evaluation and management.   - OTOLARYNGOLOGY REFERRAL  - fexofenadine-pseudoePHEDrine (ALLEGRA-D 24) 180-240 MG per 24 hr tablet; Take 1 tablet by mouth daily  Dispense: 14 tablet; Refill: 1  - fluticasone (FLONASE) 50 MCG/ACT spray; Spray 1-2 sprays into both nostrils daily  Dispense: 1 Bottle; Refill: 11    2. 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 Referral to ENT for further evaluation and management.   - OTOLARYNGOLOGY REFERRAL  - fluticasone (FLONASE) 50 MCG/ACT spray; Spray 1-2 sprays into both nostrils daily  Dispense: 1 Bottle; Refill: 11       There are no discontinued medications.    Options for treatment and follow-up care were reviewed with the patient. Yola Amin  engaged in the decision making process and verbalized understanding of the options discussed and agreed with the final  plan.    KAJAL Jeffries CNP

## 2018-11-20 DIAGNOSIS — H91.90 HEARING LOSS, UNSPECIFIED HEARING LOSS TYPE, UNSPECIFIED LATERALITY: Primary | ICD-10-CM

## 2018-11-27 ENCOUNTER — OFFICE VISIT (OUTPATIENT)
Dept: AUDIOLOGY | Facility: CLINIC | Age: 58
End: 2018-11-27
Payer: COMMERCIAL

## 2018-11-27 ENCOUNTER — OFFICE VISIT (OUTPATIENT)
Dept: OTOLARYNGOLOGY | Facility: CLINIC | Age: 58
End: 2018-11-27
Payer: COMMERCIAL

## 2018-11-27 ENCOUNTER — PRE VISIT (OUTPATIENT)
Dept: OTOLARYNGOLOGY | Facility: CLINIC | Age: 58
End: 2018-11-27

## 2018-11-27 VITALS — WEIGHT: 215 LBS | HEIGHT: 66 IN | BODY MASS INDEX: 34.55 KG/M2

## 2018-11-27 DIAGNOSIS — H90.3 BILATERAL SENSORINEURAL HEARING LOSS: ICD-10-CM

## 2018-11-27 DIAGNOSIS — H91.90 HEARING LOSS, UNSPECIFIED HEARING LOSS TYPE, UNSPECIFIED LATERALITY: ICD-10-CM

## 2018-11-27 DIAGNOSIS — H61.23 EXCESSIVE CERUMEN IN BOTH EAR CANALS: ICD-10-CM

## 2018-11-27 DIAGNOSIS — H90.3 SENSORY HEARING LOSS, BILATERAL: Primary | ICD-10-CM

## 2018-11-27 DIAGNOSIS — H92.02 LEFT EAR PAIN: Primary | ICD-10-CM

## 2018-11-27 ASSESSMENT — PAIN SCALES - GENERAL: PAINLEVEL: NO PAIN (0)

## 2018-11-27 NOTE — LETTER
11/27/2018       RE: Yola Amin  [address]  [place] [address]     Dear Colleague,    Thank you for referring your patient, Yola Amin, to the [facility] EAR NOSE AND THROAT at [facility]. Please see a copy of my visit note below.    Dear Falguni Reza:    I had the pleasure of meeting Yola Amin in consultation today at the [facility] Ot[facility] at your request.    CHIEF COMPLAINT: Left ear pain    HISTORY OF PRESENT ILLNESS: Patient is a 58-year-old referred in today for left ear pain and abnormal left ear exam.  Says he has been having pressure and a swollen feeling in her ears, especially left side for almost 3-1/2 months.  She has had occasional drainage.  She says the drainage is just moisture to her finger when she places it to the ear, does not stain the pillow.  She has not had any temperature or fever.  She does have bilateral tinnitus she has had for years, not problematic.  She does have some hearing issues, she feels her hearing is equal and symmetrical.  She has been around noise in the [place], concerts, etc.  She denies any dysphagia, hoarseness, facial paresthesias.  There is no dizziness.  She did see a nurse practitioner in October and some drops were given, told the ear canal was swollen.  Do itch occasionally.    ALLERGIES:  No Known Allergies    HABITS:   Alcohol use Yes 1.8 oz/week   Comment: 5-6 drinks per week    History   Smoking Status     Never Smoker   Smokeless Tobacco     Never Used         PAST MEDICAL HISTORY: Please see today's intake form (for the remainder of the PMH) which I reviewed and signed.  Past Medical History:   Diagnosis Date     Allergic rhinitis 2013     Hearing problem early 1990s    Hearing loss for high-pitched tones     Reduced vision 2017    visual migraines     Seasonal allergies      Tinnitus as long as I can remember    constant, but ignorable  most of the time       FAMILY HISTORY/SOCIAL HISTORY:   Family History   Problem Relation Age of Onset     Diabetes Mother      Hypertension Mother      Rheumatologic Disease Mother      Cancer Maternal Grandmother      liver or pancreatic (not sure which)     Liver Disease Maternal Grandmother      Cancer Paternal Grandmother      Cancer Father      Prostate cancer     Cerebrovascular Disease Maternal Grandfather      Cancer Paternal Grandfather      Prostate cancer     No Known Problems Sister      No Known Problems Sister      No Known Problems Sister      Diabetes Sister     Social History     Social History     Marital status: Single     Spouse name: N/A     Number of children: N/A     Years of education: N/A     Occupational History     Industrial organizational psychologist      Social History Main Topics     Smoking status: Never Smoker     Smokeless tobacco: Never Used     Alcohol use 1.8 oz/week      Comment: 5-6 drinks per week     Drug use: No     Sexual activity: Not Currently     Partners: Female     Birth control/ protection: Post-menopausal     Other Topics Concern     Not on file     Social History Narrative    Work is going well.  Lives with wife.  Two cats.        Has a good support system.    Feels safe in all environments.    Wears seatbelt 100% of the time    Wears helmet while biking.    Denies history of abuse, past or present, physical, sexual or emotional.    12/21/17    Nancy Kasper PA-C             PHYSICIAL EXAMINATION:  Constitutional: The patient was well-groomed and in no acute distress.   Skin: Warm and pink.  Psychiatric: The patient's affect was calm, cooperative, and appropriate.   Respiratory: Breathing comfortably without stridor or exertion of accessory muscles.  Eyes: Pupils were equal and reactive. Extraocular movement intact.   Head: Normocephalic and atraumatic. No lesions or scars.  Ears: Both ears initially examined and impacted cerumen found in the  ear next to the TM.   Patient placed supine under the microscope for microscopic cleaning and assessment.   The impacted ear was initially examined microsopically and under high power magnification, cleaned of impacted material with curets, suction, and alligator forceps.  Following cleaning, the TM and middle fully visualized and have normal appearance. The opposite was then examined and cleaned of excessive cerumen using microscope, similar instruments and techniques. Following cleaning, TM fully visualized and has normal appearance with normal middle ear.  Nose: Sinuses were nontender. Anterior rhinoscopy revealed midline septum and absence of purulence or polyps.  Oral Cavity: Normal tongue, floor of mouth, buccal mucosa, and palate. No lesions or masses on inspection or palpation. No abnormal lymph tissue in the oropharynx.   Neck: The parotid is soft without masses. Supple with normal laryngeal and tracheal landmarks.   Lymphatic: There is no palpable lymphadenopathy or other masses in the neck.   Neurologic: Alert and oriented x 3. Cranial nerves III-XI within normal limits. Voice quality normal.  Cerebellar Function Tests:  Grossly normal    Audiogram: Audiogram performed shows a bilateral high-frequency sensorineural hearing loss above 2000 Hz.  Maintains 100% discrimination bilaterally.    IMPRESSION AND PLAN:   1. Impacted cerumen left ear, excessive cerumen right: Cleaned today using microscope and instruments, normal exam after cleaning, no further treatment needed, monitor.  2. Bilateral high-frequency sensorineural hearing loss: Protect ears from noise.  Hearing aids discussed, she will pursue that at her discretion in place and location.  Monitor with follow-up in 1 year to be sure not progressing and then periodically if stable.  3. Bilateral tinnitus: Secondary to sensorineural hearing loss, not problematic, monitor, no further treatment needed at this time.    Thank you very much for the opportunity to participate in  the care of your patient.    Rick L Nissen MD

## 2018-11-27 NOTE — PATIENT INSTRUCTIONS
1.  You were seen in the ENT Clinic today by Dr. Nissen.  If you have any questions or concerns after your appointment, please call [phone]. Press option #1 for scheduling related needs. Press option #3 for Nurse advice.  2.  Plan is to return to clinic as needed.    Noy Antoine LPN  [facility] ENT

## 2018-11-27 NOTE — MR AVS SNAPSHOT
After Visit Summary   11/27/2018    Yola Amin    MRN: [id number]           Patient Information     Date Of Birth          1960        Visit Information        Provider Department      11/27/2018 3:00 PM Nissen, Rick L, MD [facility] Nose [facility]        Today's Diagnoses     Left ear pain    -  1    Excessive cerumen in both ear canals        Bilateral sensorineural hearing loss          Care Instructions    1.  You were seen in the ENT Clinic today by Dr. Nissen.  If you have any questions or concerns after your appointment, please call [phone]. Press option #1 for scheduling related needs. Press option #3 for Nurse advice.  2.  Plan is to return to clinic as needed.    Noy Antoine LPN  [facility] ENT            Follow-ups after your visit        Your next 10 appointments already scheduled     Bienvenido 15, 2019  9:30 AM CST   (Arrive by 9:15 AM)   Return Visit with AKIRA Vizcarra MD   [facility] ([facility])    [address]  [address] [place] [id number] [phone]              Who to contact     Please call your clinic at [phone] to:    Ask questions about your health    Make or cancel appointments    Discuss your medicines    Learn about your test results    Speak to your doctor            Additional Information About Your Visit        July SystemsharCuÃ­date Information     [employer] gives you secure access to your electronic health record. If you see a primary care provider, you can also send messages to your care team and make appointments. If you have questions, please call your primary care clinic.  If you do not have a primary care provider, please call [phone] and they will assist you.      [employer] is an electronic gateway that provides easy, online access to your medical records. With [employer], you can request a clinic appointment, read your test results, renew a prescription or communicate with your care  "team.     To access your existing account, please contact your [facility] or call [phone] for assistance.        Care EveryWhere ID     This is your Care EveryWhere ID. This could be used by other organizations to access your [place] medical records  [id number]        Your Vitals Were     Height Last Period BMI (Body Mass Index)             1.67 m (5' 5.75\") 02/01/2015 34.97 kg/m2          Blood Pressure from Last 3 Encounters:   10/22/18 134/86   04/03/18 120/79   03/22/18 (!) 140/91    Weight from Last 3 Encounters:   11/27/18 97.5 kg (215 lb)   10/22/18 93.9 kg (207 lb)   04/03/18 90.9 kg (200 lb 8 oz)              We Performed the Following     BINOCULAR MICROSCOPY        Primary Care Provider Office Phone # Fax #    Falguni Kasper PA-C [phone]       [address] [place] [id number]        Equal Access to Services     LAKESHIA WILLIS : Hadii aad ku hadasho Soomaali, waaxda luqadaha, qaybta kaalmada adeegyada, waxay idiin hayalaina ren . So [facility] [phone].    ATENCIÓN: Si habla español, tiene a augustine disposición servicios gratuitos de asistencia lingüística. [facility] [phone].    We comply with applicable federal civil rights laws and [place] laws. We do not discriminate on the basis of race, color, national origin, age, disability, sex, sexual orientation, or gender identity.            Thank you!     Thank you for choosing [facility] EAR NOSE AND THROAT  for your care. Our goal is always to provide you with excellent care. Hearing back from our patients is one way we can continue to improve our services. Please take a few minutes to complete the written survey that you may receive in the mail after your visit with us. Thank you!             Your Updated Medication List - Protect others around you: Learn how to safely use, store and throw away your medicines at www.disposemymeds.org.          This list is accurate as of " 11/27/18  7:39 PM.  Always use your most recent med list.                   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

## 2018-11-27 NOTE — TELEPHONE ENCOUNTER
FUTURE VISIT INFORMATION      FUTURE VISIT INFORMATION:    Date: 11-27-18    Time:     Location:   REFERRAL INFORMATION:    Referring provider:  YASMINE FRY     Referring providers clinic:      Reason for visit/diagnosis  left ear pain     RECORDS REQUESTED FROM:       Clinic name Comments Records Status Imaging Status   All records internal

## 2018-11-27 NOTE — PROGRESS NOTES
AUDIOLOGY REPORT    SUMMARY: Audiology visit completed. See audiogram for results.      RECOMMENDATIONS: Follow-up with ENT.    Jemma Mcnair.  Licensed Audiologist  [place] # [id number]

## 2018-11-27 NOTE — NURSING NOTE
"Chief Complaint   Patient presents with     Consult     left ear pain      Height 1.67 m (5' 5.75\"), weight 97.5 kg (215 lb), last menstrual period 02/01/2015, not currently breastfeeding.    Jordon Galaviz LPN    "

## 2018-11-28 NOTE — PROGRESS NOTES
Dear Falguni Reza:    I had the pleasure of meeting Yola Amin in consultation today at the [facility] at your request.    CHIEF COMPLAINT: Left ear pain    HISTORY OF PRESENT ILLNESS: Patient is a 58-year-old referred in today for left ear pain and abnormal left ear exam.  Says he has been having pressure and a swollen feeling in her ears, especially left side for almost 3-1/2 months.  She has had occasional drainage.  She says the drainage is just moisture to her finger when she places it to the ear, does not stain the pillow.  She has not had any temperature or fever.  She does have bilateral tinnitus she has had for years, not problematic.  She does have some hearing issues, she feels her hearing is equal and symmetrical.  She has been around noise in the past [place] farm, concerts, etc.  She denies any dysphagia, hoarseness, facial paresthesias.  There is no dizziness.  She did see a nurse practitioner in October and some drops were given, told the ear canal was swollen.  Do itch occasionally.    ALLERGIES:  No Known Allergies    HABITS:   Alcohol use Yes 1.8 oz/week   Comment: 5-6 drinks per week    History   Smoking Status     Never Smoker   Smokeless Tobacco     Never Used         PAST MEDICAL HISTORY: Please see today's intake form (for the remainder of the PMH) which I reviewed and signed.  Past Medical History:   Diagnosis Date     Allergic rhinitis 2013     Hearing problem early 1990s    Hearing loss for high-pitched tones     Reduced vision 2017    visual migraines     Seasonal allergies      Tinnitus as long as I can remember    constant, but ignorable most of the time       FAMILY HISTORY/SOCIAL HISTORY:   Family History   Problem Relation Age of Onset     Diabetes Mother      Hypertension Mother      Rheumatologic Disease Mother      Cancer Maternal Grandmother      liver or pancreatic (not sure which)     Liver Disease Maternal Grandmother       Cancer Paternal Grandmother      Cancer Father      Prostate cancer     Cerebrovascular Disease Maternal Grandfather      Cancer Paternal Grandfather      Prostate cancer     No Known Problems Sister      No Known Problems Sister      No Known Problems Sister      Diabetes Sister     Social History     Social History     Marital status: Single     Spouse name: N/A     Number of children: N/A     Years of education: N/A     Occupational History     Industrial organizational psychologist      Social History Main Topics     Smoking status: Never Smoker     Smokeless tobacco: Never Used     Alcohol use 1.8 oz/week      Comment: 5-6 drinks per week     Drug use: No     Sexual activity: Not Currently     Partners: Female     Birth control/ protection: Post-menopausal     Other Topics Concern     Not on file     Social History Narrative    Work is going well.  Lives with wife.  Two cats.        Has a good support system.    Feels safe in all environments.    Wears seatbelt 100% of the time    Wears helmet while biking.    Denies history of abuse, past or present, physical, sexual or emotional.    12/21/17    Nancy Kasper PA-C               REVIEW OF SYSTEMS: Patient Supplied Answers to Review of Systems   ENT ROS 11/26/2018   Constitutional Weight gain   Ears, Nose, Throat Ear pain, Ringing/noise in ears   Musculoskeletal Back pain, Neck pain       The remainder of the 10 point ROS is negative    PHYSICIAL EXAMINATION:  Constitutional: The patient was well-groomed and in no acute distress.   Skin: Warm and pink.  Psychiatric: The patient's affect was calm, cooperative, and appropriate.   Respiratory: Breathing comfortably without stridor or exertion of accessory muscles.  Eyes: Pupils were equal and reactive. Extraocular movement intact.   Head: Normocephalic and atraumatic. No lesions or scars.  Ears: Both ears initially examined and impacted cerumen found in the  ear next to the TM.  Patient placed supine under the  microscope for microscopic cleaning and assessment.   The impacted ear was initially examined microsopically and under high power magnification, cleaned of impacted material with curets, suction, and alligator forceps.  Following cleaning, the TM and middle fully visualized and have normal appearance. The opposite was then examined and cleaned of excessive cerumen using microscope, similar instruments and techniques. Following cleaning, TM fully visualized and has normal appearance with normal middle ear.  Nose: Sinuses were nontender. Anterior rhinoscopy revealed midline septum and absence of purulence or polyps.  Oral Cavity: Normal tongue, floor of mouth, buccal mucosa, and palate. No lesions or masses on inspection or palpation. No abnormal lymph tissue in the oropharynx.   Neck: The parotid is soft without masses. Supple with normal laryngeal and tracheal landmarks.   Lymphatic: There is no palpable lymphadenopathy or other masses in the neck.   Neurologic: Alert and oriented x 3. Cranial nerves III-XI within normal limits. Voice quality normal.  Cerebellar Function Tests:  Grossly normal    Audiogram: Audiogram performed shows a bilateral high-frequency sensorineural hearing loss above 2000 Hz.  Maintains 100% discrimination bilaterally.      IMPRESSION AND PLAN:   1. Impacted cerumen left ear, excessive cerumen right: Cleaned today using microscope and instruments, normal exam after cleaning, no further treatment needed, monitor.  2. Bilateral high-frequency sensorineural hearing loss: Protect ears from noise.  Hearing aids discussed, she will pursue that at her discretion in place and location.  Monitor with follow-up in 1 year to be sure not progressing and then periodically if stable.  3. Bilateral tinnitus: Secondary to sensorineural hearing loss, not problematic, monitor, no further treatment needed at this time.    Thank you very much for the opportunity to participate in the care of your  patient.    Rick L Nissen MD

## 2019-01-13 ASSESSMENT — ENCOUNTER SYMPTOMS
SINUS CONGESTION: 0
NECK MASS: 0
SORE THROAT: 0
SMELL DISTURBANCE: 0
TASTE DISTURBANCE: 0
HOARSE VOICE: 0
SINUS PAIN: 1
TROUBLE SWALLOWING: 0
BREAST MASS: 0
BREAST PAIN: 1

## 2019-01-15 ENCOUNTER — OFFICE VISIT (OUTPATIENT)
Dept: PLASTIC SURGERY | Facility: CLINIC | Age: 59
End: 2019-01-15
Attending: PLASTIC SURGERY
Payer: COMMERCIAL

## 2019-01-15 DIAGNOSIS — N64.4 BREAST PAIN: Primary | ICD-10-CM

## 2019-01-15 NOTE — LETTER
1/15/2019       RE: Yola Amin  [address]  [place] [address]     Dear Colleague,    Thank you for referring your patient, Yola Amin, to the [facility] BREAST CENTER at [facility]. Please see a copy of my visit note below.    PRESENTING COMPLAINT:  Postoperative visit, status post cosmetic bilateral breast reduction on 03/22/2018.       HISTORY OF PRESENT ILLNESS:  Ms. Amin is 57 years old.  She is about 9 months out from surgery.  Feels well and has healed in. The firm area on the left is stable and minimally painful.       PHYSICAL EXAMINATION:  On exam vital signs stable.  She is afebrile in no obvious distress.  Her right and left breasts have healed. There is an area of firmness in the left lateral area consistent with fat necrosis/scarring. Minimal tenderness. No external depression or indentation.      ASSESSMENT AND PLAN:  Based on above findings, a diagnosis of bilateral cosmetic breast reduction was made. She has some fat necrosis/scarring that is palpable on the left. Described the pathophysiology. Advised time and massage. There are no aesthetic concerns. Offered an US to rule out a cyst.  Mammograms yearly. RTC  if things change. All questions were answered.  She was seen in the presence of a chaperone.         Again, thank you for allowing me to participate in the care of your patient.      Sincerely,    AKIRA Vizcarra MD      
13-May-2017 13:39

## 2019-01-15 NOTE — PROGRESS NOTES
PRESENTING COMPLAINT:  Postoperative visit, status post cosmetic bilateral breast reduction on 03/22/2018.       HISTORY OF PRESENT ILLNESS:  Ms. Amin is 57 years old.  She is about 9 months out from surgery.  Feels well and has healed in. The firm area on the left is stable and minimally painful.       PHYSICAL EXAMINATION:  On exam vital signs stable.  She is afebrile in no obvious distress.  Her right and left breasts have healed. There is an area of firmness in the left lateral area consistent with fat necrosis/scarring. Minimal tenderness. No external depression or indentation.      ASSESSMENT AND PLAN:  Based on above findings, a diagnosis of bilateral cosmetic breast reduction was made. She has some fat necrosis/scarring that is palpable on the left. Described the pathophysiology. Advised time and massage. There are no aesthetic concerns. Offered an US to rule out a cyst.  Mammograms yearly. RTC  if things change. All questions were answered.  She was seen in the presence of a chaperone.

## 2019-08-01 ENCOUNTER — OFFICE VISIT (OUTPATIENT)
Dept: FAMILY MEDICINE | Facility: CLINIC | Age: 59
End: 2019-08-01
Payer: COMMERCIAL

## 2019-08-01 VITALS
SYSTOLIC BLOOD PRESSURE: 127 MMHG | HEIGHT: 67 IN | OXYGEN SATURATION: 95 % | DIASTOLIC BLOOD PRESSURE: 85 MMHG | TEMPERATURE: 97.9 F | WEIGHT: 223.75 LBS | HEART RATE: 74 BPM | BODY MASS INDEX: 35.12 KG/M2

## 2019-08-01 DIAGNOSIS — Z13.1 SCREENING FOR DIABETES MELLITUS: ICD-10-CM

## 2019-08-01 DIAGNOSIS — Z23 NEED FOR VACCINATION: ICD-10-CM

## 2019-08-01 DIAGNOSIS — E78.5 HYPERLIPIDEMIA LDL GOAL <130: ICD-10-CM

## 2019-08-01 DIAGNOSIS — J20.9 ACUTE BRONCHITIS WITH SYMPTOMS > 10 DAYS: Primary | ICD-10-CM

## 2019-08-01 PROBLEM — G89.29 CHRONIC NECK AND BACK PAIN: Status: RESOLVED | Noted: 2018-03-08 | Resolved: 2019-08-01

## 2019-08-01 PROBLEM — R03.0 ELEVATED BLOOD PRESSURE READING WITHOUT DIAGNOSIS OF HYPERTENSION: Status: RESOLVED | Noted: 2017-10-24 | Resolved: 2019-08-01

## 2019-08-01 PROBLEM — M54.9 CHRONIC NECK AND BACK PAIN: Status: RESOLVED | Noted: 2018-03-08 | Resolved: 2019-08-01

## 2019-08-01 PROBLEM — M54.2 CHRONIC NECK AND BACK PAIN: Status: RESOLVED | Noted: 2018-03-08 | Resolved: 2019-08-01

## 2019-08-01 PROBLEM — N64.89 PENDULOUS BREAST: Status: RESOLVED | Noted: 2018-03-08 | Resolved: 2019-08-01

## 2019-08-01 LAB
% GRANULOCYTES: 72 %G (ref 40–75)
ANION GAP SERPL CALCULATED.3IONS-SCNC: 4 MMOL/L (ref 3–14)
BUN SERPL-MCNC: 17 MG/DL (ref 7–30)
CALCIUM SERPL-MCNC: 9.4 MG/DL (ref 8.5–10.1)
CHLORIDE SERPL-SCNC: 107 MMOL/L (ref 94–109)
CHOLEST SERPL-MCNC: 277 MG/DL (ref 0–200)
CHOLEST/HDLC SERPL: 4 {RATIO} (ref 0–5)
CO2 SERPL-SCNC: 27 MMOL/L (ref 20–32)
CREAT SERPL-MCNC: 0.81 MG/DL (ref 0.52–1.04)
ERYTHROCYTE [DISTWIDTH] IN BLOOD BY AUTOMATED COUNT: 13.9 %
FASTING SPECIMEN: NO
GFR SERPL CREATININE-BSD FRML MDRD: 79 ML/MIN/{1.73_M2}
GLUCOSE SERPL-MCNC: 92 MG/DL (ref 70–99)
GRANULOCYTES #: 5.7 K/UL (ref 1.6–8.3)
HBA1C MFR BLD: 5.8 % (ref 4.1–5.7)
HCT VFR BLD AUTO: 44.6 % (ref 35–47)
HDLC SERPL-MCNC: 69 MG/DL
HEMOGLOBIN: 14.1 G/DL (ref 11.7–15.7)
LDLC SERPL CALC-MCNC: 185 MG/DL (ref 0–129)
LYMPHOCYTES # BLD AUTO: 2 K/UL (ref 0.8–5.3)
LYMPHOCYTES NFR BLD AUTO: 25.8 %L (ref 20–48)
MCH RBC QN AUTO: 26.7 PG (ref 26.5–35)
MCHC RBC AUTO-ENTMCNC: 31.6 G/DL (ref 32–36)
MCV RBC AUTO: 84.5 FL (ref 78–100)
MID #: 0.2 K/UL (ref 0–2.2)
MID %: 2.2 %M (ref 0–20)
PLATELET # BLD AUTO: 327 K/UL (ref 150–450)
POTASSIUM SERPL-SCNC: 4.5 MMOL/L (ref 3.4–5.3)
RBC # BLD AUTO: 5.28 M/UL (ref 3.8–5.2)
SODIUM SERPL-SCNC: 138 MMOL/L (ref 133–144)
TRIGL SERPL-MCNC: 117 MG/DL (ref 0–150)
VLDL-CHOLESTEROL: 23 (ref 7–32)
WBC # BLD AUTO: 7.9 K/UL (ref 4–11)

## 2019-08-01 RX ORDER — AZITHROMYCIN 250 MG/1
TABLET, FILM COATED ORAL
Qty: 6 TABLET | Refills: 0 | Status: SHIPPED | OUTPATIENT
Start: 2019-08-01 | End: 2022-08-19

## 2019-08-01 ASSESSMENT — MIFFLIN-ST. JEOR: SCORE: 1620.16

## 2019-08-01 NOTE — NURSING NOTE
"59 year old  Chief Complaint   Patient presents with     URI     chest congestion / wheezing / productive cough and sinus pressure,  post sinus drainage. x 3-4mons        Blood pressure 127/85, pulse 74, temperature 97.9  F (36.6  C), temperature source Oral, height 1.69 m (5' 6.54\"), weight 101.5 kg (223 lb 12 oz), last menstrual period 02/01/2015, SpO2 95 %, not currently breastfeeding. Body mass index is 35.54 kg/m .  Patient Active Problem List   Diagnosis     Allergic rhinitis     Elevated blood pressure reading without diagnosis of hypertension     Hypertrophy of breast     Chronic neck and back pain     Pendulous breast     S/P bilateral breast reduction       Wt Readings from Last 2 Encounters:   08/01/19 101.5 kg (223 lb 12 oz)   11/27/18 97.5 kg (215 lb)     BP Readings from Last 3 Encounters:   08/01/19 127/85   10/22/18 134/86   04/03/18 120/79         Current Outpatient Medications   Medication     fexofenadine-pseudoePHEDrine (ALLEGRA-D 24) 180-240 MG per 24 hr tablet     fluticasone (FLONASE) 50 MCG/ACT spray     Glucosamine-Chondroitin (GLUCOSAMINE CHONDR COMPLEX PO)     IBUPROFEN PO     Multiple Vitamin (MULTIVITAMIN) per tablet     No current facility-administered medications for this visit.        Social History     Tobacco Use     Smoking status: Never Smoker     Smokeless tobacco: Never Used   Substance Use Topics     Alcohol use: Yes     Alcohol/week: 1.8 oz     Comment: 5-6 drinks per week     Drug use: No       Health Maintenance Due   Topic Date Due     ADVANCE CARE PLANNING  1960     ZOSTER IMMUNIZATION (1 of 2) 04/28/2011     PREVENTIVE CARE VISIT  12/21/2018     PHQ-2  01/01/2019     LIPID  03/08/2019     DTAP/TDAP/TD IMMUNIZATION (3 - Td) 04/14/2019       Lab Results   Component Value Date    PAP NIL 12/21/2017 August 1, 2019 9:05 AM    "

## 2019-08-01 NOTE — PATIENT INSTRUCTIONS
You have been prescribed an antibiotic to treat a bacterial infection. Watch for signs of allergic reaction including swelling around the mouth or eyes and the development of a rash.  If you develop any of these symptoms, stop your medication, take a benadryl (25-50 mg) and give our office a call. Consider probiotic therapy to decrease the possibility of diarrhea associated with antibiotic use.    Cough medication with expectorant such as Mucinex-DM.    Consider adding flonase 2 sprays each nostril once daily.  Continue with the Allegra

## 2019-08-01 NOTE — NURSING NOTE
Screening Questionnaire for Adult Immunization    Are you sick today?   No   Do you have allergies to medications, food, a vaccine component or latex?   No   Have you ever had a serious reaction after receiving a vaccination?   No   Do you have a long-term health problem with heart disease, lung disease, asthma, kidney disease, metabolic disease (e.g. diabetes), anemia, or other blood disorder?   No   Do you have cancer, leukemia, HIV/AIDS, or any other immune system problem?   No   In the past 3 months, have you taken medications that affect  your immune system, such as prednisone, other steroids, or anticancer drugs; drugs for the treatment of rheumatoid arthritis, Crohn s disease, or psoriasis; or have you had radiation treatments?   No   Have you had a seizure, or a brain or other nervous system problem?   No   During the past year, have you received a transfusion of blood or blood     products, or been given immune (gamma) globulin or antiviral drug?   No   For women: Are you pregnant or is there a chance you could become        pregnant during the next month?   No   Have you received any vaccinations in the past 4 weeks?   No     Immunization questionnaire answers were all negative.      Given by Paris Han. Patient instructed to remain in clinic for 15 minutes afterwards, and to report any adverse reaction to me immediately.       Screening performed by Paris Han on 8/1/2019 at 9:32 AM.

## 2019-08-01 NOTE — PROGRESS NOTES
Subjective     Yola Amin is a 59 year old female who presents to clinic today for the following health issues:    HPI   Persistent Cough   Acute illness concerns: Yola reports she first developed a bad cough in April, which has not fully resolved, along with another flare in June.  She notes she feels congestion, and feels the need to cough up sputum, but notes today her sx feel clearer than they have been.  Her cough has primarily been dry.  She notes her sinuses feel full, which may be allergy related, and has lead to headaches.  She has had some post nasal drainage.  She states she can feel congestion and wheezing when sleeping and resting.  She mentions that she prefers to avoid taking antibiotics.  No shortness of breath or fever.  Onset: 3-4 months    Fever: no    Chills/Sweats: no    Headache (location?): YES- sinus area    Sinus Pressure:YES    Conjunctivitis:  no    Ear Pain: no    Rhinorrhea: YES    Congestion: YES    Sore Throat: no     Cough: YES - mostly a dry cough    Wheeze: YES - no shortness of breath. No history of asthma. She hears and feels wheezing?/congestion at night.    Decreased Appetite: no    Nausea: no    Vomiting: no    Diarrhea:  no    Dysuria/Freq.: no    Fatigue/Achiness: no    Sick/Strep Exposure: no     Therapies Tried and outcome: Allegra for allergies daily, occasionally will use Flonase a couple days per week.  She notes Flonase has helped her sinus pressure, but not resolved cough.  She tried Mucinex DM, and notes this did not resolve sx.  She has never used an inhaler.      Immunizations: Discussed need for TDAP.  Ordered, completed today    Health maintenance issues reviewed.    Recommend CPE in the near future.  Will order labs today as noted below.      Patient Active Problem List   Diagnosis     Allergic rhinitis     Hypertrophy of breast     S/P bilateral breast reduction     Past Surgical History:   Procedure Laterality Date     bone marrow donation  1988      CYSTECTOMY OVARIAN BENIGN      left     ENDOCERVICAL CURETTAGE       MAMMOPLASTY REDUCTION BILATERAL Bilateral 3/22/2018    Procedure: MAMMOPLASTY REDUCTION BILATERAL;  Bilateral Breast Reduction;  Surgeon: AKIRA Vizcarra MD;  Location:  OR       Social History     Tobacco Use     Smoking status: Never Smoker     Smokeless tobacco: Never Used   Substance Use Topics     Alcohol use: Yes     Alcohol/week: 1.8 oz     Comment: 5-6 drinks per week     Family History   Problem Relation Age of Onset     Diabetes Mother      Hypertension Mother      Rheumatologic Disease Mother      Cancer Maternal Grandmother         liver or pancreatic (not sure which)     Liver Disease Maternal Grandmother      Cancer Paternal Grandmother      Cancer Father         Prostate cancer     Cerebrovascular Disease Maternal Grandfather      Cancer Paternal Grandfather         Prostate cancer     No Known Problems Sister      No Known Problems Sister      No Known Problems Sister      Diabetes Sister          Current Outpatient Medications   Medication Sig Dispense Refill     azithromycin (ZITHROMAX) 250 MG tablet Two tablets first day, then one tablet daily for four days. 6 tablet 0     fexofenadine-pseudoePHEDrine (ALLEGRA-D 24) 180-240 MG per 24 hr tablet Take 1 tablet by mouth daily 14 tablet 1     fluticasone (FLONASE) 50 MCG/ACT spray Spray 1-2 sprays into both nostrils daily 1 Bottle 11     Glucosamine-Chondroitin (GLUCOSAMINE CHONDR COMPLEX PO) Take 1 capsule by mouth daily        IBUPROFEN PO Take 200 mg by mouth every 4 hours as needed for moderate pain       Multiple Vitamin (MULTIVITAMIN) per tablet Take 1 tablet by mouth daily.       No Known Allergies  BP Readings from Last 3 Encounters:   08/01/19 127/85   10/22/18 134/86   04/03/18 120/79    Wt Readings from Last 3 Encounters:   08/01/19 101.5 kg (223 lb 12 oz)   11/27/18 97.5 kg (215 lb)   10/22/18 93.9 kg (207 lb)              Reviewed and updated as needed this visit  "by Provider  Problems         Review of Systems   ROS COMP: Constitutional, HEENT, cardiovascular, pulmonary, gi and gu systems are negative, except as otherwise noted.      Objective    /85 (BP Location: Left arm, Patient Position: Sitting, Cuff Size: Adult Large)   Pulse 74   Temp 97.9  F (36.6  C) (Oral)   Ht 1.69 m (5' 6.54\")   Wt 101.5 kg (223 lb 12 oz)   LMP 02/01/2015   SpO2 95%   BMI 35.54 kg/m    Body mass index is 35.54 kg/m .  Physical Exam   GENERAL: healthy, alert and no distress  EYES:  PERRL, EOMI, conjunctiva normal  HEENT: TMS and canals clear, mouth and throat normal, posterior pharynx without inflammation or exudate, nasal mucosa: swollen and boggy.  NO purulent nasal discharge. No tenderness with palp over sinuses  RESP: lungs clear to auscultation - no rales, rhonchi or wheezes - Harch breath sounds.  No wheeze with forced expiration  CV: regular rate and rhythm, normal S1 S2, no S3 or S4, no murmur, click or rub, no peripheral edema and peripheral pulses strong  SKIN: no suspicious lesions or rashes          Assessment & Plan       ICD-10-CM    1. Acute bronchitis with symptoms > 10 days J20.9 CBC with Diff Plt ([facility])     azithromycin (ZITHROMAX) 250 MG tablet   2. Hyperlipidemia LDL goal <130 E78.5 Lipid Panel ([place])   3. Screening for diabetes mellitus Z13.1 Hemoglobin A1c ([place])     Basic metabolic panel     CANCELED: Basic Metabolic Panel ([place])   4. Need for vaccination Z23 TDAP ( BOOSTRIX AGES 10-64)        Patient Instructions   You have been prescribed an antibiotic to treat a bacterial infection. Watch for signs of allergic reaction including swelling around the mouth or eyes and the development of a rash.  If you develop any of these symptoms, stop your medication, take a benadryl (25-50 mg) and give our office a call. Consider probiotic therapy to decrease the possibility of diarrhea associated with antibiotic use.    Cough medication with expectorant " such as Mucinex-DM.    Consider adding flonase 2 sprays each nostril once daily.  Continue with the Allegra            Last CPE 2017.  Consider appointment for CPE in the near future      Return for Physical Exam.    Falguni Kasper PA-C  [facility]    I, Tone Mckeon, am serving as a scribe to document services personally performed by Falguni Kasper PA-C, based on data collection and the provider's statements to me. Falguni Kasper PA-C, has reviewed, edited, and approv

## 2019-10-04 ENCOUNTER — HEALTH MAINTENANCE LETTER (OUTPATIENT)
Age: 59
End: 2019-10-04

## 2020-02-08 ENCOUNTER — HEALTH MAINTENANCE LETTER (OUTPATIENT)
Age: 60
End: 2020-02-08

## 2020-09-18 NOTE — LETTER
4/13/2018         RE: Yola Amin  [address]    [place] [address]        Dear Colleague,    Thank you for referring your patient, Yola Amin, to the [facility]. Please see a copy of my visit note below.    PRESENTING COMPLAINT:  Postoperative visit, status post cosmetic bilateral breast reduction on 03/22/2018.      HISTORY OF PRESENT ILLNESS:  Ms. Amin is 57 years old.  She is about 3 weeks out from surgery.  Got a phone call that she was having some drainage from her left breast and wanted to be seen.  Had her come in today.  Denies any fevers, minimal pain, more on the left than on the right and feels well.      PHYSICAL EXAMINATION:  On exam vital signs stable.  She is afebrile in no obvious distress.  Her right breast is healed.  Left breast is healing well.  No evidence for obvious cellulitis.  There is a small T-junction site less than 1 cm opening superficially at the inframammary fold area as well as a free nipple to vertical scar junction.      ASSESSMENT AND PLAN:  Based on above findings, a diagnosis of bilateral cosmetic breast reduction was made.  She has small superficial T-junction site skin openings but no obvious cellulitis at this time.  I reassured the patient.  Had her continue to monitor it very closely over the weekend.  If there are any signs of redness, firmness, swelling or increasing pain or fevers, she will call me, otherwise I will see her on Tuesday.  All questions were answered.  She was seen in the presence of a chaperone.         Again, thank you for allowing me to participate in the care of your patient.        Sincerely,        AKIRA Vizcarra MD     (4) walks frequently

## 2020-11-08 ENCOUNTER — HEALTH MAINTENANCE LETTER (OUTPATIENT)
Age: 60
End: 2020-11-08

## 2021-03-29 ENCOUNTER — IMMUNIZATION (OUTPATIENT)
Dept: FAMILY MEDICINE | Facility: CLINIC | Age: 61
End: 2021-03-29
Payer: COMMERCIAL

## 2021-03-29 PROCEDURE — 0011A PR COVID VAC MODERNA 100 MCG/0.5 ML IM: CPT

## 2021-03-29 PROCEDURE — 91301 PR COVID VAC MODERNA 100 MCG/0.5 ML IM: CPT

## 2021-04-26 ENCOUNTER — IMMUNIZATION (OUTPATIENT)
Dept: FAMILY MEDICINE | Facility: CLINIC | Age: 61
End: 2021-04-26
Attending: FAMILY MEDICINE
Payer: COMMERCIAL

## 2021-04-26 PROCEDURE — 91301 PR COVID VAC MODERNA 100 MCG/0.5 ML IM: CPT

## 2021-04-26 PROCEDURE — 0012A PR COVID VAC MODERNA 100 MCG/0.5 ML IM: CPT

## 2021-09-11 ENCOUNTER — HEALTH MAINTENANCE LETTER (OUTPATIENT)
Age: 61
End: 2021-09-11

## 2022-01-01 ENCOUNTER — HEALTH MAINTENANCE LETTER (OUTPATIENT)
Age: 62
End: 2022-01-01

## 2022-02-26 ENCOUNTER — HEALTH MAINTENANCE LETTER (OUTPATIENT)
Age: 62
End: 2022-02-26

## 2022-08-19 ENCOUNTER — OFFICE VISIT (OUTPATIENT)
Dept: FAMILY MEDICINE | Facility: CLINIC | Age: 62
End: 2022-08-19
Payer: COMMERCIAL

## 2022-08-19 VITALS
HEART RATE: 72 BPM | DIASTOLIC BLOOD PRESSURE: 76 MMHG | TEMPERATURE: 98.1 F | OXYGEN SATURATION: 97 % | RESPIRATION RATE: 16 BRPM | SYSTOLIC BLOOD PRESSURE: 122 MMHG | HEIGHT: 65 IN | BODY MASS INDEX: 37.49 KG/M2 | WEIGHT: 225 LBS

## 2022-08-19 DIAGNOSIS — Z12.31 VISIT FOR SCREENING MAMMOGRAM: ICD-10-CM

## 2022-08-19 DIAGNOSIS — Z13.220 SCREENING FOR HYPERLIPIDEMIA: ICD-10-CM

## 2022-08-19 DIAGNOSIS — H92.03 DISCOMFORT OF BOTH EARS: ICD-10-CM

## 2022-08-19 DIAGNOSIS — Z23 NEED FOR VACCINATION: ICD-10-CM

## 2022-08-19 DIAGNOSIS — L81.9 ATYPICAL PIGMENTED SKIN LESION: ICD-10-CM

## 2022-08-19 DIAGNOSIS — N95.0 POSTMENOPAUSAL BLEEDING: Primary | ICD-10-CM

## 2022-08-19 DIAGNOSIS — E78.5 DYSLIPIDEMIA: ICD-10-CM

## 2022-08-19 LAB
BASOPHILS # BLD AUTO: 0 10E3/UL (ref 0–0.2)
BASOPHILS NFR BLD AUTO: 1 %
EOSINOPHIL # BLD AUTO: 0.2 10E3/UL (ref 0–0.7)
EOSINOPHIL NFR BLD AUTO: 3 %
ERYTHROCYTE [DISTWIDTH] IN BLOOD BY AUTOMATED COUNT: 14.6 % (ref 10–15)
HCT VFR BLD AUTO: 45.1 % (ref 35–47)
HGB BLD-MCNC: 14.9 G/DL (ref 11.7–15.7)
LYMPHOCYTES # BLD AUTO: 1.8 10E3/UL (ref 0.8–5.3)
LYMPHOCYTES NFR BLD AUTO: 24 %
MCH RBC QN AUTO: 27.6 PG (ref 26.5–33)
MCHC RBC AUTO-ENTMCNC: 33 G/DL (ref 31.5–36.5)
MCV RBC AUTO: 84 FL (ref 78–100)
MONOCYTES # BLD AUTO: 0.4 10E3/UL (ref 0–1.3)
MONOCYTES NFR BLD AUTO: 6 %
NEUTROPHILS # BLD AUTO: 4.9 10E3/UL (ref 1.6–8.3)
NEUTROPHILS NFR BLD AUTO: 66 %
PLATELET # BLD AUTO: 295 10E3/UL (ref 150–450)
RBC # BLD AUTO: 5.4 10E6/UL (ref 3.8–5.2)
WBC # BLD AUTO: 7.4 10E3/UL (ref 4–11)

## 2022-08-19 PROCEDURE — 90750 HZV VACC RECOMBINANT IM: CPT | Performed by: INTERNAL MEDICINE

## 2022-08-19 PROCEDURE — 80050 GENERAL HEALTH PANEL: CPT | Performed by: INTERNAL MEDICINE

## 2022-08-19 PROCEDURE — 90471 IMMUNIZATION ADMIN: CPT | Performed by: INTERNAL MEDICINE

## 2022-08-19 PROCEDURE — 36415 COLL VENOUS BLD VENIPUNCTURE: CPT | Performed by: INTERNAL MEDICINE

## 2022-08-19 PROCEDURE — 99204 OFFICE O/P NEW MOD 45 MIN: CPT | Mod: 25 | Performed by: INTERNAL MEDICINE

## 2022-08-19 PROCEDURE — 80061 LIPID PANEL: CPT | Performed by: INTERNAL MEDICINE

## 2022-08-19 ASSESSMENT — PAIN SCALES - GENERAL: PAINLEVEL: NO PAIN (0)

## 2022-08-19 NOTE — PATIENT INSTRUCTIONS
As discussed, will do pertinent lab work.     Placed Ultrasound transvaginal for your spotting .    Referral to dermatology placed.    [facility]  ( [facility] )   Call : [phone]  Toll Free : [phone]    ==============================    [facility]  ( [facility] )   [facility]  Call : [phone]  Toll Free : [phone]    ==============================  [facility]  Phone : [phone]    ===============================    [facility]   ( All Locations )   Call : [phone]  Toll Free : [phone]       =====================

## 2022-08-19 NOTE — PROGRESS NOTES
Assessment and Plan  1. Postmenopausal bleeding  New problem, Pt states that she has been Spotting 2x/week for past 1 year. Given her age and possible Endometrial hyperplasia needs to be excluded before considering this as atrophic vaginitis. Pt understood and agreed with the plan. Will do CBC and TSH for any metabolic causes. Placed Transvaginal USG and AVS given with details of the plan.   - CBC with platelets and differential; Future  - US Pelvic Complete with Transvaginal; Future  - CBC with platelets and differential  - TSH with free T4 reflex; Future    2. Discomfort of both ears  Not currently but only when water enters her ears during shower. ENT exam normal. Reassurance given and recommended to wear ear plugs to prevent entry of water into the ears to prevent her symptoms.    3. Dyslipidemia  - Comprehensive metabolic panel (BMP + Alb, Alk Phos, ALT, AST, Total. Bili, TP); Future  - Comprehensive metabolic panel (BMP + Alb, Alk Phos, ALT, AST, Total. Bili, TP)    4. Visit for screening mammogram  - MA SCREENING DIGITAL BILAT - Future  (s+30); Future    5. Screening for hyperlipidemia  - Lipid panel reflex to direct LDL Non-fasting; Future  - Lipid panel reflex to direct LDL Non-fasting    6. Need for vaccination  - ZOSTER VACCINE RECOMBINANT ADJUVANTED IM NJX    7. Atypical pigmented skin lesion  New problem, positive for atypical pigmented lesion on the Rt temporal region measuring 1 cm in diameter. Will give referral to dermatology as per shared decision for need of excision biopsy possibly.   - Adult Dermatology Referral; Future     Patient Instructions   As discussed, will do pertinent lab work.     Placed Ultrasound transvaginal for your spotting .    Referral to dermatology placed.    [facility]  ( [facility] )   Call : [phone]  Toll Free : [phone]    ==============================    [facility]  ( [facility] )   [facility]  Call :  [phone]  Toll Free : [phone]    ==============================  [facility]  Phone : [phone]    ===============================    [facility]   ( All Locations )   Call : [phone]  Toll Free : [phone]       =====================        Return in about 3 months (around 11/19/2022), or if symptoms worsen or fail to improve, for Preventative Visit.    Queenie Carcamo MD  [facility] SATYA Aceves is a 62 year old presenting for the following health issues:  Otalgia and Vaginal Bleeding      History of Present Illness       Reason for visit:  3 years since last checkup, need to check on cholesterol and blood sugars, also wondering about continued spotting even though I'm post-menopause    She eats 2-3 servings of fruits and vegetables daily.She consumes 0 sweetened beverage(s) daily.She exercises with enough effort to increase her heart rate 20 to 29 minutes per day.  She exercises with enough effort to increase her heart rate 4 days per week.   She is taking medications regularly.    Pt is new to me, last seen [facility] in 8/2019 for acute symptoms. She is here for acute concerns - ear isues , post menopausal spotting. Last labs in 2019 positive for dyslipidemia.  Has been getting PAP smear till 2019 . WIll schedule physical with PAP.        No Known Allergies     Past Medical History:   Diagnosis Date     Allergic rhinitis 2013     Hearing problem early 1990s    Hearing loss for high-pitched tones     Reduced vision 2017    visual migraines     Seasonal allergies      Tinnitus as long as I can remember    constant, but ignorable most of the time       Past Surgical History:   Procedure Laterality Date     bone marrow donation  1988     CYSTECTOMY OVARIAN BENIGN      left     ENDOCERVICAL CURETTAGE       MAMMOPLASTY REDUCTION BILATERAL Bilateral 3/22/2018    Procedure: MAMMOPLASTY REDUCTION  "BILATERAL;  Bilateral Breast Reduction;  Surgeon: AKIRA Vizcarra MD;  Location: [facility] OR       Family History   Problem Relation Age of Onset     Diabetes Mother      Hypertension Mother      Rheumatologic Disease Mother      Cancer Maternal Grandmother         liver or pancreatic (not sure which)     Liver Disease Maternal Grandmother      Cancer Paternal Grandmother      Cancer Father         Prostate cancer     Cerebrovascular Disease Maternal Grandfather      Cancer Paternal Grandfather         Prostate cancer     No Known Problems Sister      No Known Problems Sister      No Known Problems Sister      Diabetes Sister        Social History     Tobacco Use     Smoking status: Never Smoker     Smokeless tobacco: Never Used   Substance Use Topics     Alcohol use: Yes     Alcohol/week: 3.0 standard drinks     Comment: 5-6 drinks per week        Current Outpatient Medications   Medication     fexofenadine-pseudoePHEDrine (ALLEGRA-D 24) 180-240 MG per 24 hr tablet     Glucosamine-Chondroitin (GLUCOSAMINE CHONDR COMPLEX PO)     IBUPROFEN PO     Multiple Vitamin (MULTIVITAMIN) per tablet     No current facility-administered medications for this visit.        Review of Systems   Constitutional, HEENT, cardiovascular, pulmonary, GI, , musculoskeletal, neuro, skin, endocrine and psych systems are negative, except as otherwise noted.      Objective    /76   Pulse 72   Temp 98.1  F (36.7  C) (Temporal)   Resp 16   Ht 1.651 m (5' 5\")   Wt 102.1 kg (225 lb)   LMP 02/01/2015   SpO2 97%   BMI 37.44 kg/m    Body mass index is 37.44 kg/m .  Physical Exam   GENERAL: healthy, alert and no distress  ENT Exam : Ear canals and TM's normal, nose and mouth without ulcers or lesions, NO pharyngeal erythema, Cervical lymphadenopathy or Sinus tenderness on palpation.  NECK: no adenopathy, no asymmetry, masses, or scars and thyroid normal to palpation  RESP: lungs clear to auscultation - no rales, rhonchi or wheezes  CV: " regular rate and rhythm, normal S1 S2, no S3 or S4, no murmur, click or rub, no peripheral edema and peripheral pulses strong  ABDOMEN: soft, nontender, no hepatosplenomegaly, no masses and bowel sounds normal  MS: no gross musculoskeletal defects noted, no edema  SKIN : Positive for atypical pigmented lesion on the Rt temporal region measuring 1 cm in diameter.

## 2022-08-20 LAB
ALBUMIN SERPL-MCNC: 4 G/DL (ref 3.4–5)
ALP SERPL-CCNC: 60 U/L (ref 40–150)
ALT SERPL W P-5'-P-CCNC: 38 U/L (ref 0–50)
ANION GAP SERPL CALCULATED.3IONS-SCNC: 7 MMOL/L (ref 3–14)
AST SERPL W P-5'-P-CCNC: 20 U/L (ref 0–45)
BILIRUB SERPL-MCNC: 0.4 MG/DL (ref 0.2–1.3)
BUN SERPL-MCNC: 16 MG/DL (ref 7–30)
CALCIUM SERPL-MCNC: 9.4 MG/DL (ref 8.5–10.1)
CHLORIDE BLD-SCNC: 108 MMOL/L (ref 94–109)
CHOLEST SERPL-MCNC: 245 MG/DL
CO2 SERPL-SCNC: 25 MMOL/L (ref 20–32)
CREAT SERPL-MCNC: 0.79 MG/DL (ref 0.52–1.04)
FASTING STATUS PATIENT QL REPORTED: YES
GFR SERPL CREATININE-BSD FRML MDRD: 85 ML/MIN/1.73M2
GLUCOSE BLD-MCNC: 92 MG/DL (ref 70–99)
HDLC SERPL-MCNC: 58 MG/DL
LDLC SERPL CALC-MCNC: 160 MG/DL
NONHDLC SERPL-MCNC: 187 MG/DL
POTASSIUM BLD-SCNC: 4.7 MMOL/L (ref 3.4–5.3)
PROT SERPL-MCNC: 7.6 G/DL (ref 6.8–8.8)
SODIUM SERPL-SCNC: 140 MMOL/L (ref 133–144)
TRIGL SERPL-MCNC: 136 MG/DL

## 2022-08-22 LAB — TSH SERPL DL<=0.005 MIU/L-ACNC: 1.21 MU/L (ref 0.4–4)

## 2022-08-22 NOTE — RESULT ENCOUNTER NOTE
Your Cholesterol remaining high. Given your age and no cardiac risk factors , recommend dietery management of avoiding high fat foods and red meat. Life style measures on weight reduction recommended. Your ASCVD score of cardiac risk is low , and no statin needed as per guidelines.     All your other labs normal, you may see some highlighted which do not have Clinical significance.    Please let me know if you have any questions.  Queenie Carcamo MD on 8/22/2022 at 6:31 PM  [facility]

## 2022-08-23 ENCOUNTER — TELEPHONE (OUTPATIENT)
Dept: FAMILY MEDICINE | Facility: CLINIC | Age: 62
End: 2022-08-23

## 2022-08-23 ENCOUNTER — ANCILLARY PROCEDURE (OUTPATIENT)
Dept: MAMMOGRAPHY | Facility: CLINIC | Age: 62
End: 2022-08-23
Attending: INTERNAL MEDICINE
Payer: COMMERCIAL

## 2022-08-23 DIAGNOSIS — N85.00 ENDOMETRIAL HYPERPLASIA: Primary | ICD-10-CM

## 2022-08-23 DIAGNOSIS — Z12.31 VISIT FOR SCREENING MAMMOGRAM: ICD-10-CM

## 2022-08-23 PROCEDURE — 77067 SCR MAMMO BI INCL CAD: CPT | Mod: GC | Performed by: RADIOLOGY

## 2022-08-23 NOTE — TELEPHONE ENCOUNTER
Pebbles calling from [facility] ([phone])    Pebbles reporting an incidental finding on the pelvic ultrasound from today, 8/23/2022. Found thickened endometrium measuring up to 2 cm and recommend OBGYN referral and tissue sampling.       Coretta Cornelius, RN BSN MSN  [facility]

## 2022-08-24 ENCOUNTER — TELEPHONE (OUTPATIENT)
Dept: FAMILY MEDICINE | Facility: CLINIC | Age: 62
End: 2022-08-24

## 2022-08-24 ENCOUNTER — MYC MEDICAL ADVICE (OUTPATIENT)
Dept: FAMILY MEDICINE | Facility: CLINIC | Age: 62
End: 2022-08-24

## 2022-08-24 NOTE — TELEPHONE ENCOUNTER
Please see separate results telephone encounter from today 8/24/22.   Zari YOUNG RN  [facility]

## 2022-08-24 NOTE — TELEPHONE ENCOUNTER
----- Message from Queenie Carcamo MD sent at 8/23/2022 10:00 PM CDT -----  Your Ultrasound results are showing abnormal Endometrial thickening as discussed with you in your Office visit with me on this possibility. Will need Biopsy and further recommendations from OBGYN. , referral placed as priority to get you in sooner. Please keep up your appointment with referral placed. Let me know if you have any questions,     Queenie Carcamo MD on 8/23/2022 at 10:00 PM

## 2022-08-26 NOTE — TELEPHONE ENCOUNTER
PCP result note seen by pt via my chart. No further action needed.    Tiffanie BLACK RN  EP Triage

## 2022-09-07 ENCOUNTER — LAB REQUISITION (OUTPATIENT)
Dept: LAB | Facility: CLINIC | Age: 62
End: 2022-09-07
Payer: COMMERCIAL

## 2022-09-07 PROCEDURE — 88305 TISSUE EXAM BY PATHOLOGIST: CPT | Mod: TC,ORL | Performed by: OBSTETRICS & GYNECOLOGY

## 2022-09-07 PROCEDURE — 88305 TISSUE EXAM BY PATHOLOGIST: CPT | Mod: 26 | Performed by: PATHOLOGY

## 2022-09-08 LAB
PATH REPORT.COMMENTS IMP SPEC: NORMAL
PATH REPORT.COMMENTS IMP SPEC: NORMAL
PATH REPORT.FINAL DX SPEC: NORMAL
PATH REPORT.GROSS SPEC: NORMAL
PATH REPORT.MICROSCOPIC SPEC OTHER STN: NORMAL
PATH REPORT.RELEVANT HX SPEC: NORMAL
PHOTO IMAGE: NORMAL

## 2022-10-26 NOTE — PROGRESS NOTES
[facility] Dermatology Note  Encounter Date: Oct 27, 2022  Office Visit     Dermatology Problem List:  # NUB, right temple, s/p shave bx 10/27/2022  ____________________________________________    Assessment & Plan:    # NUB, right temple, biopsy ddx Atypical nevus r/o melanoma, less likely flat SK  - Shave biopsy today, see procedure below    # Seborrheic keratosis  # Solar lentigines  - Reassured of benign nature, no treatment necessary.  - Sun precaution was advised including the use of sun screens of SPF 30 or higher, sun protective clothing, and avoidance of tanning beds.    Procedures Performed:   - Shave biopsy procedure note, location(s): see above. After discussion of benefits and risks including but not limited to bleeding, infection, scar, incomplete removal, recurrence, and non-diagnostic biopsy, written consent and photographs were obtained. The area was cleaned with isopropyl alcohol. 0.5mL of 1% lidocaine with epinephrine was injected to obtain adequate anesthesia of lesion(s). Shave biopsy at site(s) performed. Hemostasis was achieved with aluminium chloride. Petrolatum ointment and a sterile dressing were applied. The patient tolerated the procedure and no complications were noted. The patient was provided with verbal and written post care instructions.     Follow-up: pending path results    Staff and Scribe:     Scribe Disclosure:  NGOC, Carlo Markham, am serving as a scribe to document services personally performed by Uche Martinez MD based on data collection and the provider's statements to me.     Provider Disclosure:   The documentation recorded by the scribe accurately reflects the services I personally performed and the decisions made by me.    Uche Martinez MD    Department of Dermatology  [facility] Clinics: Phone: [phone], Fax:[phone]  [facility]  Surgery Center: Phone: [phone] Fax: [phone]  ____________________________________________    CC: Derm Problem (Mole of concern on the right side of the temple.  Changed in color. )    HPI:  Ms. Yola Amin is a(n) 62 year old female who presents today as a new patient for spot check. Referred by Dr. Carcamo for pigmented lesion on the right temporal region.    Today, patient reports a spot on the right temple that has been present for several years, but it seems to have changed color. Denies associated itch, bleeding, or tenderness. No personal history of skin cancer or atypical moles. No family history of skin cancer. Typically tans with sun exposure. No history of tanning bed use. 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    Patient is otherwise feeling well, without additional skin concerns.    Labs Reviewed:  N/A    Physical Exam:  Vitals: LMP 02/01/2015   SKIN: Focused examination of face and scalp was performed.  - 9 mm x 5 mm, right temple, brown macule with irregular granular pigment surrounding hair follicles.  - There is a waxy stuck on tan to brown papule on the left nasal sidewall.  - Scattered brown macules on face.     - No other lesions of concern on areas examined.         Medications:  Current Outpatient Medications   Medication     fexofenadine-pseudoePHEDrine (ALLEGRA-D 24) 180-240 MG per 24 hr tablet     Glucosamine-Chondroitin (GLUCOSAMINE CHONDR COMPLEX PO)     IBUPROFEN PO     Multiple Vitamin (MULTIVITAMIN) per tablet     No current facility-administered medications for this visit.      Past Medical History:   Patient Active Problem List   Diagnosis     Allergic rhinitis     S/P bilateral breast reduction     Past Medical History:   Diagnosis Date     Allergic rhinitis 2013     Hearing problem early 1990s    Hearing loss for high-pitched tones     Reduced vision 2017    visual migraines     Seasonal allergies      Tinnitus as long as I  can remember    constant, but ignorable most of the time        HOSSEIN Carcamo MD  [address] [place] [address] on close of this encounter.

## 2022-10-27 ENCOUNTER — OFFICE VISIT (OUTPATIENT)
Dept: DERMATOLOGY | Facility: CLINIC | Age: 62
End: 2022-10-27
Attending: INTERNAL MEDICINE
Payer: COMMERCIAL

## 2022-10-27 DIAGNOSIS — L81.4 SOLAR LENTIGO: ICD-10-CM

## 2022-10-27 DIAGNOSIS — L81.9 ATYPICAL PIGMENTED SKIN LESION: ICD-10-CM

## 2022-10-27 DIAGNOSIS — D49.2 NEOPLASM OF UNSPECIFIED BEHAVIOR OF BONE, SOFT TISSUE, AND SKIN: Primary | ICD-10-CM

## 2022-10-27 DIAGNOSIS — L82.1 SEBORRHEIC KERATOSIS: ICD-10-CM

## 2022-10-27 PROCEDURE — 99203 OFFICE O/P NEW LOW 30 MIN: CPT | Mod: 25 | Performed by: DERMATOLOGY

## 2022-10-27 PROCEDURE — 11102 TANGNTL BX SKIN SINGLE LES: CPT | Performed by: DERMATOLOGY

## 2022-10-27 PROCEDURE — 88305 TISSUE EXAM BY PATHOLOGIST: CPT | Mod: 26 | Performed by: DERMATOLOGY

## 2022-10-27 PROCEDURE — 88305 TISSUE EXAM BY PATHOLOGIST: CPT | Mod: TC | Performed by: DERMATOLOGY

## 2022-10-27 ASSESSMENT — PAIN SCALES - GENERAL: PAINLEVEL: NO PAIN (0)

## 2022-10-27 NOTE — NURSING NOTE
Dermatology Rooming Note    Yola Amin's goals for this visit include:   Chief Complaint   Patient presents with     Derm Problem     Mole of concern on the right side of the temple.  Changed in color.      Falguni Pak, CMA

## 2022-10-27 NOTE — LETTER
10/27/2022       RE: Yola Amin  [address] [place] [address]  [place] [address]     Dear Colleague,    Thank you for referring your patient, Yola Amin, to the [facility] DERMATOLOGY CLINIC [place] at [facility]. Please see a copy of my visit note below.    [facility] Dermatology Note  Encounter Date: Oct 27, 2022  Office Visit     Dermatology Problem List:  # NUB, right temple, s/p shave bx 10/27/2022  ____________________________________________    Assessment & Plan:    # NUB, right temple, biopsy ddx Atypical nevus r/o melanoma, less likely flat SK  - Shave biopsy today, see procedure below    # Seborrheic keratosis  # Solar lentigines  - Reassured of benign nature, no treatment necessary.  - Sun precaution was advised including the use of sun screens of SPF 30 or higher, sun protective clothing, and avoidance of tanning beds.    Procedures Performed:   - Shave biopsy procedure note, location(s): see above. After discussion of benefits and risks including but not limited to bleeding, infection, scar, incomplete removal, recurrence, and non-diagnostic biopsy, written consent and photographs were obtained. The area was cleaned with isopropyl alcohol. 0.5mL of 1% lidocaine with epinephrine was injected to obtain adequate anesthesia of lesion(s). Shave biopsy at site(s) performed. Hemostasis was achieved with aluminium chloride. Petrolatum ointment and a sterile dressing were applied. The patient tolerated the procedure and no complications were noted. The patient was provided with verbal and written post care instructions.     Follow-up: pending path results    Staff and Scribe:     Scribe Disclosure:  I, Carlo Markham, am serving as a scribe to document services personally performed by Uche Martinez MD based on data collection and the provider's statements to me.     Provider Disclosure:   The documentation  recorded by the scribe accurately reflects the services I personally performed and the decisions made by me.    Uche Martinez MD    Department of Dermatology  [facility][note 3]: Phone: [phone], Fax:[phone]  [facility]: Phone: [phone] Fax: [phone]  ____________________________________________    CC: Derm Problem (Mole of concern on the right side of the temple.  Changed in color. )    HPI:  Ms. Yola Amin is a(n) 62 year old female who presents today as a new patient for spot check. Referred by Dr. Carcamo for pigmented lesion on the right temporal region.    Today, patient reports a spot on the right temple that has been present for several years, but it seems to have changed color. Denies associated itch, bleeding, or tenderness. No personal history of skin cancer or atypical moles. No family history of skin cancer. Typically tans with sun exposure. No history of tanning bed use. 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    Patient is otherwise feeling well, without additional skin concerns.    Labs Reviewed:  N/A    Physical Exam:  Vitals: LMP 02/01/2015   SKIN: Focused examination of face and scalp was performed.  - 9 mm x 5 mm, right temple, brown macule with irregular granular pigment surrounding hair follicles.  - There is a waxy stuck on tan to brown papule on the left nasal sidewall.  - Scattered brown macules on face.     - No other lesions of concern on areas examined.         Medications:  Current Outpatient Medications   Medication     fexofenadine-pseudoePHEDrine (ALLEGRA-D 24) 180-240 MG per 24 hr tablet     Glucosamine-Chondroitin (GLUCOSAMINE CHONDR COMPLEX PO)     IBUPROFEN PO     Multiple Vitamin (MULTIVITAMIN) per tablet     No current facility-administered medications for this visit.      Past Medical History:    Patient Active Problem List   Diagnosis     Allergic rhinitis     S/P bilateral breast reduction     Past Medical History:   Diagnosis Date     Allergic rhinitis 2013     Hearing problem early 1990s    Hearing loss for high-pitched tones     Reduced vision 2017    visual migraines     Seasonal allergies      Tinnitus as long as I can remember    constant, but ignorable most of the time        CC Queenie Carcamo MD  [address] [place] [address] [place] [address] on close of this encounter.

## 2022-10-27 NOTE — NURSING NOTE
Lidocaine-epinephrine 1-1:945326 % injection   1mL once for one use, starting 10/27/2022 ending 10/27/2022,  2mL disp, R-0, injection  Injected by Falguni Pak CMA

## 2022-10-27 NOTE — PATIENT INSTRUCTIONS
Patient Education     Checking for Skin Cancer  You can find cancer early by checking your skin each month. There are 3 kinds of skin cancer. They are melanoma, basal cell carcinoma, and squamous cell carcinoma. Doing monthly skin checks is the best way to find new marks or skin changes. Follow the instructions below for checking your skin.   The ABCDEs of checking moles for melanoma   Check your moles or growths for signs of melanoma using ABCDE:   Asymmetry: the sides of the mole or growth don t match  Border: the edges are ragged, notched, or blurred  Color: the color within the mole or growth varies  Diameter: the mole or growth is larger than 6 mm (size of a pencil eraser)  Evolving: the size, shape, or color of the mole or growth is changing (evolving is not shown in the images below)    Checking for other types of skin cancer  Basal cell carcinoma or squamous cell carcinoma have symptoms such as:     A spot or mole that looks different from all other marks on your skin  Changes in how an area feels, such as itching, tenderness, or pain  Changes in the skin's surface, such as oozing, bleeding, or scaliness  A sore that does not heal  New swelling or redness beyond the border of a mole    Who s at risk?  Anyone can get skin cancer. But you are at greater risk if you have:   Fair skin, light-colored hair, or light-colored eyes  Many moles or abnormal moles on your skin  A history of sunburns from sunlight or tanning beds  A family history of skin cancer  A history of exposure to radiation or chemicals  A weakened immune system  If you have had skin cancer in the past, you are at risk for recurring skin cancer.   How to check your skin  Do your monthly skin checkups in front of a full-length mirror. Check all parts of your body, including your:   Head (ears, face, neck, and scalp)  Torso (front, back, and sides)  Arms (tops, undersides, upper, and lower armpits)  Hands (palms, backs, and fingers, including  under the nails)  Buttocks and genitals  Legs (front, back, and sides)  Feet (tops, soles, toes, including under the nails, and between toes)  If you have a lot of moles, take digital photos of them each month. Make sure to take photos both up close and from a distance. These can help you see if any moles change over time.   Most skin changes are not cancer. But if you see any changes in your skin, call your doctor right away. Only he or she can diagnose a problem. If you have skin cancer, seeing your doctor can be the first step toward getting the treatment that could save your life.   Yor[employer] last reviewed this educational content on 4/1/2019 2000-2020 [employer]. [address] [place] [address], [place] [address]. All rights reserved. This information is not intended as a substitute for professional medical care. Always follow your healthcare professional's instructions.       When should I call my doctor?  If you are worsening or not improving, please, contact us or seek urgent care as noted below.     Who should I call with questions (adults)?  [facility] (adult and pediatric): [phone]  [facility] (adult): [phone]  For urgent needs outside of business hours call the [facility] at [phone] and ask for the dermatology resident on call to be paged  If this is a medical emergency and you are unable to reach an ER, Call [phone]    Who should I call with questions (pediatric)?  [facility]- Pediatric Dermatology  Dr. Lulu Aviles, Dr. Pranav Bennett, Dr. Cici Garcia, CASSIDY Tobin, Dr. Carey Pike, Dr. Wilma Fofana & Dr. Armando Burrows  Non-urgent nurse triage line; [phone]- Denia and Angelika GASTELUM Care Coordinatorelba Pan (/Complex ) [phone]    If you need a prescription refill, please contact your pharmacy. Refills are approved or denied by our  Physicians during normal business hours, Monday through Fridays  Per office policy, refills will not be granted if you have not been seen within the past year (or sooner depending on your child's condition)    Scheduling Information:  Pediatric Appointment Scheduling and Call Center [phone]  Radiology Scheduling- [phone]  Sedation Unit Scheduling- [phone]  [place] Scheduling- General [phone]; Pediatric Dermatology [phone]  Main  Services: [phone]  [place]wana: [phone]  Filipino: [phone]  Hmong/Guatemalan/Occitan: [phone]  Preadmission Nursing Department Fax Number: [phone] (Fax all pre-operative paperwork to this number)    For urgent matters arising during evenings, weekends, or holidays that cannot wait for normal business hours please call [phone] and ask for the dermatology resident on call to be paged.     Wound Care After a Biopsy    What is a skin biopsy?  A skin biopsy allows the doctor to examine a very small piece of tissue under the microscope to determine the diagnosis and the best treatment for the skin condition. A local anesthetic (numbing medicine)  is injected with a very small needle into the skin area to be tested. A small piece of skin is taken from the area. Sometimes a suture (stitch) is used.     What are the risks of a skin biopsy?  I will experience scar, bleeding, swelling, pain, crusting and redness. I may experience incomplete removal or recurrence. Risks of this procedure are excessive bleeding, bruising, infection, nerve damage, numbness, thick (hypertrophic or keloidal) scar and non-diagnostic biopsy.    How should I care for my wound for the first 24 hours?  Keep the wound dry and covered for 24 hours  If it bleeds, hold direct pressure on the area for 15 minutes. If bleeding does not stop then go to the emergency room  Avoid strenuous exercise the first 1-2 days or as your doctor instructs you    How should I care for the  wound after 24 hours?  After 24 hours, remove the bandage  You may bathe or shower as normal  If you had a scalp biopsy, you can shampoo as usual and can use shower water to clean the biopsy site daily  Clean the wound twice a day with gentle soap and water  Do not scrub, be gentle  Apply white petroleum/Vaseline after cleaning the wound with a cotton swab or a clean finger, and keep the site covered with a Bandaid /bandage. Bandages are not necessary with a scalp biopsy  If you are unable to cover the site with a Bandaid /bandage, re-apply ointment 2-3 times a day to keep the site moist. Moisture will help with healing  Avoid strenuous activity for first 1-2 days  Avoid lakes, rivers, pools, and oceans until the stitches are removed or the site is healed    How do I clean my wound?  Wash hands thoroughly with soap or use hand  before all wound care  Clean the wound with gentle soap and water  Apply white petroleum/Vaseline  to wound after it is clean  Replace the Bandaid /bandage to keep the wound covered for the first few days or as instructed by your doctor  If you had a scalp biopsy, warm shower water to the area on a daily basis should suffice    What should I use to clean my wound?   Cotton-tipped applicators (Qtips )  White petroleum jelly (Vaseline ). Use a clean new container and use Q-tips to apply.  Bandaids   as needed  Gentle soap     How should I care for my wound long term?  Do not get your wound dirty  Keep up with wound care for one week or until the area is healed.  A small scab will form and fall off by itself when the area is completely healed. The area will be red and will become pink in color as it heals. Sun protection is very important for how your scar will turn out. Sunscreen with an SPF 30 or greater is recommended once the area is healed.  You should have some soreness but it should be mild and slowly go away over several days. Talk to your doctor about using tylenol for  pain,    When should I call my doctor?  If you have increased:   Pain or swelling  Pus or drainage (clear or slightly yellow drainage is ok)  Temperature over 100F  Spreading redness or warmth around wound    When will I hear about my results?  The biopsy results can take 2 weeks to come back.  Your results will automatically release to [employer] before your provider has even reviewed them.  The clinic will call you with the results, send you a [employer] message, or have you schedule a follow-up clinic or phone time to discuss the results.  Contact our clinics if you do not hear from us in 2 weeks.    Who should I call with questions?  [facility]: [phone]  [facility]: [phone]  For urgent needs outside of business hours call the [facility] at [phone] and ask for the dermatology resident on call

## 2022-10-28 LAB
PATH REPORT.COMMENTS IMP SPEC: ABNORMAL
PATH REPORT.COMMENTS IMP SPEC: ABNORMAL
PATH REPORT.COMMENTS IMP SPEC: YES
PATH REPORT.FINAL DX SPEC: ABNORMAL
PATH REPORT.GROSS SPEC: ABNORMAL
PATH REPORT.MICROSCOPIC SPEC OTHER STN: ABNORMAL
PATH REPORT.RELEVANT HX SPEC: ABNORMAL

## 2022-10-28 NOTE — RESULT ENCOUNTER NOTE
Attempted to call patient x 2. Left generic voicemail message.   Will send mychart with results.   Once ready, please place derm surg referral and schedule for MMS x 1 on the R temple.   Also follow-up with me in 3 months for a FBSE - can use BERENICE slot or transplant skin check slot if needed.   Thanks!    Uche Martinez MD  Pronouns: he/him/his    Department of Dermatology  [facility]: Phone: [phone], Fax:[phone]  [facility]: Phone: [phone] Fax: [phone]

## 2022-11-02 ENCOUNTER — TELEPHONE (OUTPATIENT)
Dept: DERMATOLOGY | Facility: CLINIC | Age: 62
End: 2022-11-02

## 2022-11-02 DIAGNOSIS — D03.9 MELANOMA IN SITU (H): Primary | ICD-10-CM

## 2022-11-02 NOTE — TELEPHONE ENCOUNTER
Attempted to call patient x 2. Left generic voicemail message.   Will send mychart with results.   Once ready, please place derm surg referral and schedule for MMS x 1 on the R temple.   Also follow-up with me in 3 months for a FBSE - can use BERENICE slot or transplant skin check slot if needed.   Thanks!

## 2022-11-03 ENCOUNTER — TELEPHONE (OUTPATIENT)
Dept: DERMATOLOGY | Facility: CLINIC | Age: 62
End: 2022-11-03

## 2022-11-03 NOTE — TELEPHONE ENCOUNTER
Left patient a voicemail to schedule a consult & mohs for Right [place], Melanoma in Situ with Dr. Francis. Provided my direct phone number.    Janet Rojas on 11/3/2022 at 3:02 PM

## 2022-11-07 NOTE — TELEPHONE ENCOUNTER
Called patient to schedule surgery with Dr. Francis    Date of Surgery: TBD    Surgery type: Mohs vs excision    Consult scheduled: Yes    Has patient had mohs with us before? No    Additional comments: will need melanoma slot after consult.      Janet Rojas on 11/7/2022 at 11:35 AM

## 2022-11-08 NOTE — TELEPHONE ENCOUNTER
FUTURE VISIT INFORMATION      FUTURE VISIT INFORMATION:    Date: 11.9.22    Time: 2:00    Location: [facility]  REFERRAL INFORMATION:    Referring provider:  Michelle    Referring providers clinic:  Derm    Reason for visit/diagnosis  Right [place], Melanoma in Situ. need melanoma slot.    RECORDS REQUESTED FROM:       Clinic name Comments Records Status Imaging Status   Derm 10.27.22  Path # [id number] Epic Epic

## 2022-11-09 ENCOUNTER — OFFICE VISIT (OUTPATIENT)
Dept: DERMATOLOGY | Facility: CLINIC | Age: 62
End: 2022-11-09
Payer: COMMERCIAL

## 2022-11-09 ENCOUNTER — PRE VISIT (OUTPATIENT)
Dept: DERMATOLOGY | Facility: CLINIC | Age: 62
End: 2022-11-09

## 2022-11-09 DIAGNOSIS — D03.30 MELANOMA IN SITU OF FACE (H): Primary | ICD-10-CM

## 2022-11-09 PROCEDURE — 99214 OFFICE O/P EST MOD 30 MIN: CPT | Mod: GC | Performed by: DERMATOLOGY

## 2022-11-09 ASSESSMENT — PAIN SCALES - GENERAL: PAINLEVEL: NO PAIN (0)

## 2022-11-09 NOTE — LETTER
11/9/2022       RE: Yola Amin  [address] [address]     Dear Colleague,    Thank you for referring your patient, Yola Amin, to the [facility] DERMATOLOGIC SURGERY CLINIC [place] at [facility]. Please see a copy of my visit note below.    Mohs Micrographic Surgery Consult Note    Nov 9, 2022    Dermatology Problem List:  1. MIS, right temple s/p shave bx 10/27/2022    Subjective: The patient is a 62 year old woman who presents today for Mohs micrographic surgery consultation for a recent diagnosis of skin cancer.    Skin cancer(s): Melanoma in situ  Location(s): Right temple  Associated symptoms: none  Onset: within last 1 year    No other associated symptoms, modifying factors, or prior treatments, except when noted above. The patient denies any constitutional symptoms, lymphadenopathy, unintentional weight loss or decreased appetite. No other skin concerns today.    Objective:   Gen: This is a well appearing individual in no acute distress. The patient is alert and oriented x 3.  An exam of the right temple was performed today and visualized the following:  - 0.7 cm irregular brown macule on the right temple.    Assessment and Plan:     1. Plan for Mohs micrographic surgery for skin cancer(s) above:  - We discussed the nature of the diagnosis/condition above. We discussed the treatment options, including the risks benefits and expectations of these options. We recommend micrographic surgery as the most effective and most tissue sparing option for treatment, and the patient agrees to proceed with this.  The patient is aware of the risks, benefits and expectations of this procedure. The patient will be scheduled for this procedure, if not already done so.  - We anticipate the following closure type: Sliding or lifting flap    The patient was discussed with and evaluated by attending physician, Krish Francis MD.    Randy  MD Matthew  Dermatology, PGY-5  Mohs surgery fellow    Scribe Disclosure:  I, Roland Acosta, am serving as a scribe to document services personally performed by Krish Francis MD based on data collection and the provider's statements to me.     Attending Attestation  I attest that I discussed the case with the Fellow.  I agree with the plan.   I have reviewed the note and edited it as necessary.    Krish Francis M.D.  Professor  Director of Dermatologic Surgery  Department of Dermatology  [facility]        Again, thank you for allowing me to participate in the care of your patient.      Sincerely,    Krish Francis MD

## 2022-11-09 NOTE — NURSING NOTE
Chief Complaint   Patient presents with     Derm Problem     Patient is here today for a melanoma consult regarding right temple.     Hollie CHAMBERS RN

## 2022-11-09 NOTE — LETTER
Date:November 14, 2022      Provider requested that no letter be sent. Do not send.       [facility]

## 2022-11-09 NOTE — PROGRESS NOTES
Mohs Micrographic Surgery Consult Note    Nov 9, 2022    Dermatology Problem List:  1. MIS, right temple s/p shave bx 10/27/2022    Subjective: The patient is a 62 year old woman who presents today for Mohs micrographic surgery consultation for a recent diagnosis of skin cancer.    Skin cancer(s): Melanoma in situ  Location(s): Right temple  Associated symptoms: none  Onset: within last 1 year    No other associated symptoms, modifying factors, or prior treatments, except when noted above. The patient denies any constitutional symptoms, lymphadenopathy, unintentional weight loss or decreased appetite. No other skin concerns today.    Objective:   Gen: This is a well appearing individual in no acute distress. The patient is alert and oriented x 3.  An exam of the right temple was performed today and visualized the following:  - 0.7 cm irregular brown macule on the right temple.    Assessment and Plan:     1. Plan for Mohs micrographic surgery for skin cancer(s) above:  - We discussed the nature of the diagnosis/condition above. We discussed the treatment options, including the risks benefits and expectations of these options. We recommend micrographic surgery as the most effective and most tissue sparing option for treatment, and the patient agrees to proceed with this.  The patient is aware of the risks, benefits and expectations of this procedure. The patient will be scheduled for this procedure, if not already done so.  - We anticipate the following closure type: Sliding or lifting flap    The patient was discussed with and evaluated by attending physician, Krish Francis MD.    Randy Castro MD  Dermatology, PGY-5  Mohs surgery fellow    Scribe Disclosure:  I, Roland Acosta, am serving as a scribe to document services personally performed by Krish Francis MD based on data collection and the provider's statements to me.     Attending Attestation  I attest that I discussed the case with the Fellow.  I agree with the  plan.   I have reviewed the note and edited it as necessary.    Krish Francis M.D.  Professor  Director of Dermatologic Surgery  Department of Dermatology  [facility]

## 2022-11-14 NOTE — TELEPHONE ENCOUNTER
Left vm with pt with direct ph#. Called to schedule pt's mohs with Dr. Francis. Per dr Castro, okay to add mohs to any 8:00am/8:30am mohs slot where there are only 5 mohs that morning.   Janet Rojas, Procedure  11/14/2022 8:38 AM

## 2022-11-17 NOTE — TELEPHONE ENCOUNTER
Left vm with pt with direct ph#. Called to schedule pt's mohs with Dr. Francis. Per dr Castro, okay to add mohs to any 8:00am/8:30am mohs slot where there are only 5 mohs that morning.     Janet Rojas, Procedure  11/17/2022 3:29 PM

## 2022-11-21 ENCOUNTER — VIRTUAL VISIT (OUTPATIENT)
Dept: FAMILY MEDICINE | Facility: CLINIC | Age: 62
End: 2022-11-21
Payer: COMMERCIAL

## 2022-11-21 DIAGNOSIS — U07.1 INFECTION DUE TO 2019 NOVEL CORONAVIRUS: Primary | ICD-10-CM

## 2022-11-21 PROCEDURE — 99213 OFFICE O/P EST LOW 20 MIN: CPT | Mod: CS | Performed by: NURSE PRACTITIONER

## 2022-11-21 NOTE — PROGRESS NOTES
"Yola is a 62 year old who is being evaluated via a billable telephone visit.      How would you like to obtain your AVS? MyChart  If the video visit is dropped, the invitation should be resent by: Text to cell phone: [phone]  Will anyone else be joining your video visit? No        Assessment & Plan     Infection due to 2019 novel coronavirus  Discussed risks and benefits of treatment with paxlovid for covid 19.  She is obese.  She has normal renal function 3 months ago.  Not on any prescribed medications. Supportive cares discussed . Discussed masking and current CDC recommendations.  Has been fully vaccinated.  Follow up in clinic if no improvement, worsening symptoms, or concerns.   - nirmatrelvir and ritonavir (PAXLOVID) therapy pack; Take 3 tablets by mouth 2 times daily for 5 days (Take 2 Nirmatrelvir tablets and 1 Ritonavir tablet twice daily for 5 days)    22 minutes spent on the date of the encounter doing chart review, patient visit and documentation        BMI:   Estimated body mass index is 37.44 kg/m  as calculated from the following:    Height as of 8/19/22: 1.651 m (5' 5\").    Weight as of 8/19/22: 102.1 kg (225 lb).   Weight management plan: Discussed healthy diet and exercise guidelines      Return in about 4 weeks (around 12/19/2022) for recheck with PCP.    Lisseth Miranda NP  [facility]    Odessa Aceves is a 62 year old, presenting for the following health issues:  Telephone      HPI     Covid Treatment options    Tested positive yesterday morning.  Had symptoms started in [facility]- thought had a cold and when home came up positive.  Symptoms started Wednesday evening 16th evening.  Day 5    She feels her symptoms have felt like a bad cold.  She is concerned about or down turn after a few days.  Feels progressing.      BMI- obesity    Medications- vitamins/ sudafed/ allegra, normal kidney funciton    Has had vaccinations- flu and covid and bivalent booster.  "       Review of Systems   Constitutional, HEENT, cardiovascular, pulmonary, GI, , musculoskeletal, neuro, skin, endocrine and psych systems are negative, except as otherwise noted.      Objective           Vitals:  No vitals were obtained today due to virtual visit.    Physical Exam   GENERAL: Healthy, alert and no distress  PSYCH: Mentation appears normal, affect normal/bright, judgement and insight intact, normal speech and appearance well-groomed.

## 2022-11-21 NOTE — TELEPHONE ENCOUNTER
Called and spoke with pt.     Called patient to schedule surgery with Dr. Church    Date of Surgery: 12/21    Surgery type: mohs for melanoma    Consult scheduled: Yes    Has patient had mohs with us before? No    Additional comments: pt requested the week of 12/19 and chose 12/21 for mohs.      Janet Guzmanied on 11/21/2022 at 12:05 PM

## 2022-11-21 NOTE — PATIENT INSTRUCTIONS
COVID-19 Outpatient Treatments  Your care team can help you find the best treatments for COVID-19. Talk to a health care provider or refer to the FDA medicine fact sheets below.    Important: You CAN'T have molnupiravir or Paxlovid if you are starting the medicine more than 5 days after your symptoms have started.  Paxlovid: https://www.fda.gov/media/647224/download  Molnupiravir: https://www.fda.gov/media/511915/download  Monoclonal antibodies: https://combatcovid.hhs.gov/what-are-monoclonal-antibodies  Paxlovid (nimatrelvir and ritonavir)  How it works  Two medicines (nirmatrelvir and ritonavir) are taken together. They stop the virus from growing. Less amount of virus is easier for your body to fight.  Benefits  Lowers risk of a hospital stay or death from COVID-19.  How to take    Medicine comes in a daily container with both medicine tablets. Take by mouth twice daily (once in the morning, once at night) for 5 days.    The number of tablets to take varies by patient.    Don't chew or break capsules. Swallow whole.  When to take  Take as soon as possible after positive COVID-19 test result, and within 5 days of your first symptoms.  Who can take it  Patients must be 12 years or older, weigh at least 88 pounds, and have tested positive for COVID-19. This is the preferred treatment for pregnant patients.  Possible side effects  Can cause altered sense of taste, diarrhea (loose, watery stools), high blood pressure, muscle aches.  Medicine conflicts    Some medicines may conflict with Paxlovid and may cause serious side effects.    Tell your care team about all the medicines you take, including prescription and over-the-counter medicines, vitamins and herbal supplements.    Your provider will review your medicines to make sure that you can safely take Paxlovid.  Cautions    Paxlovid is not advised for patients with severe kidney or liver disease. If you have kidney or liver problems, the dose may need to be  changed.    If you are pregnant or breastfeeding, talk to your care team about your options.    If you are sexually active, use trusted birth control while taking Paxlovid.  Molnupiravir  How it works  Stops the virus from growing. Less amount of virus is easier for your body to fight.  Benefits  Lowers risk of a hospital stay or death from COVID-19.  How to take  Take 4 capsules by mouth every 12 hours (4 in the morning and 4 at night) for 5 days. Don't chew or break capsules. Swallow whole.  When to take  Take as soon as possible after positive COVID-19 test result, and within 5 days of your first symptoms.  Who can take it  Patients must be 18 years or older and have tested positive for COVID-19.  Possible side effects  Diarrhea (loose, watery stools), nausea (feeling sick to your stomach), dizziness, headaches.  Medicine conflicts  Right now, there are no known conflicts with other drugs. But tell your care team all medicines you take.  Cautions    This is not advised for patients who are pregnant.    Patients who could become pregnant should use trusted birth control until 4 days after their last dose.    Sexually active people of any gender should use trusted birth control for 3 months after their last dose.  Monoclonal antibodies  How it works  Monoclonal antibodies can detect pieces of the COVID virus and stop it from infecting your cells.  Benefits  Lowers risk of a hospital stay or death from COVID-19. Monoclonal antibodies are known to work well against the omicron variant.  How it is given to you  You will receive the treatment either by an infusion through your vein (IV) or shots.  When to take  Get as soon as possible after you test positive for COVID-19, and within 7 days of your first symptoms.  Who can take it  Patients must be 12 years or older, weigh at least 88 pounds and have tested positive for COVID-19.  Possible side effects  Fever, chills, diarrhea (loose, watery stools), dizziness,  itchiness and rash.  More serious side effects include: fever, difficulty breathing, low oxygen level in your blood, chills, tiredness, fast or slow heart rate, chest discomfort or pain, weakness, confusion, nausea, headache, shortness of breath, low or high blood pressure, wheezing, swelling of your lips, face, or throat, rash including hives, itching, muscle aches, dizziness, feeling faint and sweating.  If you receive an IV, you may have brief pain, bleeding and bruising of the skin, soreness, swelling and possible infection at the place where you get the IV needle.  Medicine conflicts  Please tell you care team other medicines you take so they can assess if there are any conflicts.  Cautions  Your doctor will talk with you about risks and benefits of this treatment and will help choose the best option for you.  For informational purposes only. Not to replace the advice of your health care provider.  Copyright   2022 [facility]. All rights reserved. Clinically reviewed by Jen Kurtz. Review[employer] [id number] - 08/22.

## 2022-12-21 ENCOUNTER — OFFICE VISIT (OUTPATIENT)
Dept: DERMATOLOGY | Facility: CLINIC | Age: 62
End: 2022-12-21
Payer: COMMERCIAL

## 2022-12-21 VITALS — SYSTOLIC BLOOD PRESSURE: 142 MMHG | HEART RATE: 74 BPM | DIASTOLIC BLOOD PRESSURE: 85 MMHG

## 2022-12-21 DIAGNOSIS — D03.30 MELANOMA IN SITU OF FACE (H): Primary | ICD-10-CM

## 2022-12-21 PROCEDURE — 88305 TISSUE EXAM BY PATHOLOGIST: CPT | Mod: 26 | Performed by: DERMATOLOGY

## 2022-12-21 PROCEDURE — 88342 IMHCHEM/IMCYTCHM 1ST ANTB: CPT | Mod: GC | Performed by: DERMATOLOGY

## 2022-12-21 PROCEDURE — 14041 TIS TRNFR F/C/C/M/N/A/G/H/F: CPT | Mod: GC | Performed by: DERMATOLOGY

## 2022-12-21 PROCEDURE — 17311 MOHS 1 STAGE H/N/HF/G: CPT | Mod: GC | Performed by: DERMATOLOGY

## 2022-12-21 PROCEDURE — 88305 TISSUE EXAM BY PATHOLOGIST: CPT | Mod: TC | Performed by: DERMATOLOGY

## 2022-12-21 NOTE — LETTER
12/21/2022       RE: Yola Amin  [address] [address]     Dear Colleague,    Thank you for referring your patient, Yola Amin, to the [facility] DERMATOLOGIC SURGERY CLINIC [place] at [facility]. Please see a copy of my visit note below.    [facility] Mohs Surgery Procedure Note    Case #: 1  Date of Service:  Dec 21, 2022  Surgery: Mohs micrographic surgery (MMS)  Staff surgeon: Krish Francis MD  Fellow surgeon: Randy Castro MD  Resident surgeon: Kate Harrison MD  Nurse: Sapphire Bañuelos CMA    Tumor Type: Melanoma in situ (MIS)  Location: Right temple  Derm-Path Accession #: [id number]    Mohs Accession #:   Pre-Op Size: 1.0 cm x 0.8 cm  Final Defect Size: 2.0 cm x 2.0 cm  Number of Mohs stages: 1  Level of Defect: Fat  Local anesthetic: 12 mL 1% lidocaine with epinephrine  Repair Type: Rhombic transposition flap  Repair Size: 5.5 cm x 3.5 cm  Suture Material: 4-0 Monocryl; 5-0 fast absorbing gut    Procedure:    Stage I  We discussed the principles of treatment and most likely complications including scarring, bleeding, infection, swelling, pain, crusting, nerve damage, large wound,  incomplete excision, wound dehiscence,  nerve damage, recurrence, and a second procedure may be recommended to obtain the best cosmetic or functional result.    Informed consent was obtained and the patient underwent the procedure as follows:  The patient was placed supine on the operating table.  The cancer was identified, outlined with a marker, and verified by the patient.  The entire surgical field was prepped with chlorhexidine.  The surgical site was anesthetized using lidocaine with epinephrine.    The area of clinically apparent tumor was excised and sent for permanent sections to rule out invasive melanoma. The peripheral rim of tissue was then surgically excised using a #15 blade and was then transferred  onto a specimen sheet maintaining the orientation of the specimen. Hemostasis was obtained using bipolar electrocoagulation. The wound site was then covered with a dressing while the tissue samples were processed for examination.    The excised tissue was transported to the Mohs histology laboratory maintaining the tissue orientation.  The tissue specimen was relaxed so that the entire surgical margin was in a a single horizontal plane for sectioning and inked for precise mapping.  A precise reference map was drawn to reflect the sectioning of the specimen, colored inking of the margins, and orientation on the patient. The tissue was processed using horizontal sectioning of the base and continuous peripheral margins. Vertical, bread loafed sections were obtained from the central portion of the lesion, prior to being sent for permament sections. A control biopsy at the right lateral forehead was taken to compare the density and periodicity of melanocytes along the dermal-epidermal junction.     The tissue sections were stained with Hematoxylin and Eosin (H&E), as well as Melanoma antigen recognized by T cells or Melan-A (MART-1) stain. The histopathologic sections were reviewed in conjunction with the reference map.     Total blocks: 3   Total slides:  11    Within the central portion of the lesion, there was increased density and periodicity of atypical-appearing melanocytes with areas of confluence at the epidermis, consistent with diagnosis above.    Was the skin lesion clear at this stage?: Yes, the skin lesion was determined clear at periphery at this stage: There was a relatively normal periodicity and density of melanocytes along the dermal-epidermal junction noted at the peripheral margins, therefore Mohs surgery was complete.    PROCEDURE: Rhombic Transposition Flap    The patient was taken to the operative suite and placed supine on the operating room table.  The wound was identified and infiltrated with  lidocaine with epinephrine.  The defect was then cleansed and prepped with chlorhexidine and draped with sterile drapes.  Using a marker, a rhomboid transposition flap was planned.  The wound edges were then debeveled and the wound was undermined bluntly in all directions.  The transposition flap was incised sharply to the level of fat.  The flap was undermined from all surrounding tissue. Hemostasis was obtained with electrocoagulation.  The flap was transposed into the primary defect. The secondary defect was closed with deep dermal 4-0 Monocryl sutures. Epidermal tissue was carefully approximated using 5-0 fast absorbing gut sutures in a simple running fashion throughout the length of the flap.  Any redundant skin was excised by the triangulation technique, and closed in similar fashion.  The wound was cleansed with sterile saline and Vaseline was applied. A sterile non-adherent pressure dressing was placed.  The patient left the operating suite in stable condition. Wound care was reviewed verbally and in writing.    Follow-up for suture removal: Not applicable as only dissolving sutures used    Krish Francis MD was immediately available for the entire surgery and was physicially present for the key portions of the procedure.    Dr. Randy Castro (Mohs micrographic surgery fellow) performed the Mohs micrographic surgery and reconstruction under the direct supervision of Krish Francis MD, who was present for the entire micrographic surgery and key portions of the reconstruction, and always immediately available.    Randy Castro MD  Dermatology, PGY-5  Mohs surgery fellow    Scribe Disclosure:  Roland GROSSMAN, am serving as a scribe to document services personally performed by Krish Francis MD based on data collection and the provider's statements to me.       Attending attestation:  NGOC was present for key elements of the procedure and immediately available for all other portions of the procedure.  I have reviewed the  note and edited it as necessary.    Krish Francis M.D.  Professor  Director of Dermatologic Surgery  Department of Dermatology  [facility]    Dermatology Surgery Clinic  [facility] and Surgery [place] [id number]

## 2022-12-21 NOTE — PATIENT INSTRUCTIONS

## 2022-12-21 NOTE — PROGRESS NOTES
[facility] Mohs Surgery Procedure Note    Case #: 1  Date of Service:  Dec 21, 2022  Surgery: Mohs micrographic surgery (MMS)  Staff surgeon: Krish Francis MD  Fellow surgeon: Randy Castro MD  Resident surgeon: Kate Harrison MD  Nurse: Sapphire Bañuelos CMA    Tumor Type: Melanoma in situ (MIS)  Location: Right temple  Derm-Path Accession #: [id number]    Mohs Accession #:   Pre-Op Size: 1.0 cm x 0.8 cm  Final Defect Size: 2.0 cm x 2.0 cm  Number of Mohs stages: 1  Level of Defect: Fat  Local anesthetic: 12 mL 1% lidocaine with epinephrine  Repair Type: Rhombic transposition flap  Repair Size: 5.5 cm x 3.5 cm  Suture Material: 4-0 Monocryl; 5-0 fast absorbing gut    Procedure:    Stage I  We discussed the principles of treatment and most likely complications including scarring, bleeding, infection, swelling, pain, crusting, nerve damage, large wound,  incomplete excision, wound dehiscence,  nerve damage, recurrence, and a second procedure may be recommended to obtain the best cosmetic or functional result.    Informed consent was obtained and the patient underwent the procedure as follows:  The patient was placed supine on the operating table.  The cancer was identified, outlined with a marker, and verified by the patient.  The entire surgical field was prepped with chlorhexidine.  The surgical site was anesthetized using lidocaine with epinephrine.    The area of clinically apparent tumor was excised and sent for permanent sections to rule out invasive melanoma. The peripheral rim of tissue was then surgically excised using a #15 blade and was then transferred onto a specimen sheet maintaining the orientation of the specimen. Hemostasis was obtained using bipolar electrocoagulation. The wound site was then covered with a dressing while the tissue samples were processed for examination.    The excised tissue was transported to the Mohs histology laboratory maintaining the tissue orientation.   The tissue specimen was relaxed so that the entire surgical margin was in a a single horizontal plane for sectioning and inked for precise mapping.  A precise reference map was drawn to reflect the sectioning of the specimen, colored inking of the margins, and orientation on the patient. The tissue was processed using horizontal sectioning of the base and continuous peripheral margins. Vertical, bread loafed sections were obtained from the central portion of the lesion, prior to being sent for permament sections. A control biopsy at the right lateral forehead was taken to compare the density and periodicity of melanocytes along the dermal-epidermal junction.     The tissue sections were stained with Hematoxylin and Eosin (H&E), as well as Melanoma antigen recognized by T cells or Melan-A (MART-1) stain. The histopathologic sections were reviewed in conjunction with the reference map.     Total blocks: 3   Total slides:  11    Within the central portion of the lesion, there was increased density and periodicity of atypical-appearing melanocytes with areas of confluence at the epidermis, consistent with diagnosis above.    Was the skin lesion clear at this stage?: Yes, the skin lesion was determined clear at periphery at this stage: There was a relatively normal periodicity and density of melanocytes along the dermal-epidermal junction noted at the peripheral margins, therefore Mohs surgery was complete.    PROCEDURE: Rhombic Transposition Flap    The patient was taken to the operative suite and placed supine on the operating room table.  The wound was identified and infiltrated with lidocaine with epinephrine.  The defect was then cleansed and prepped with chlorhexidine and draped with sterile drapes.  Using a marker, a rhomboid transposition flap was planned.  The wound edges were then debeveled and the wound was undermined bluntly in all directions.  The transposition flap was incised sharply to the level of fat.   The flap was undermined from all surrounding tissue. Hemostasis was obtained with electrocoagulation.  The flap was transposed into the primary defect. The secondary defect was closed with deep dermal 4-0 Monocryl sutures. Epidermal tissue was carefully approximated using 5-0 fast absorbing gut sutures in a simple running fashion throughout the length of the flap.  Any redundant skin was excised by the triangulation technique, and closed in similar fashion.  The wound was cleansed with sterile saline and Vaseline was applied. A sterile non-adherent pressure dressing was placed.  The patient left the operating suite in stable condition. Wound care was reviewed verbally and in writing.    Follow-up for suture removal: Not applicable as only dissolving sutures used    Krish Francis MD was immediately available for the entire surgery and was physicially present for the key portions of the procedure.    Dr. Randy Castro (Mohs micrographic surgery fellow) performed the Mohs micrographic surgery and reconstruction under the direct supervision of Krish Francis MD, who was present for the entire micrographic surgery and key portions of the reconstruction, and always immediately available.    Randy Castro MD  Dermatology, PGY-5  Mohs surgery fellow    Scribe Disclosure:  IRoland, am serving as a scribe to document services personally performed by Krish Francis MD based on data collection and the provider's statements to me.       Attending attestation:  I was present for key elements of the procedure and immediately available for all other portions of the procedure.  I have reviewed the note and edited it as necessary.    Krish Francis M.D.  Professor  Director of Dermatologic Surgery  Department of Dermatology  [facility]    Dermatology Surgery Clinic  [facility] Surgery [place] [phone]

## 2022-12-23 LAB
PATH REPORT.COMMENTS IMP SPEC: NORMAL
PATH REPORT.COMMENTS IMP SPEC: NORMAL
PATH REPORT.FINAL DX SPEC: NORMAL
PATH REPORT.GROSS SPEC: NORMAL
PATH REPORT.MICROSCOPIC SPEC OTHER STN: NORMAL
PATH REPORT.RELEVANT HX SPEC: NORMAL

## 2023-03-27 ENCOUNTER — VIRTUAL VISIT (OUTPATIENT)
Dept: DERMATOLOGY | Facility: CLINIC | Age: 63
End: 2023-03-27
Payer: COMMERCIAL

## 2023-03-27 DIAGNOSIS — Z48.89 ENCOUNTER FOR POSTOPERATIVE WOUND CHECK: ICD-10-CM

## 2023-03-27 DIAGNOSIS — Z86.006 HISTORY OF MELANOMA IN SITU: ICD-10-CM

## 2023-03-27 DIAGNOSIS — L90.5 SCAR: Primary | ICD-10-CM

## 2023-03-27 DIAGNOSIS — L82.0 INFLAMED SEBORRHEIC KERATOSIS: ICD-10-CM

## 2023-03-27 PROCEDURE — 99024 POSTOP FOLLOW-UP VISIT: CPT | Mod: 95 | Performed by: DERMATOLOGY

## 2023-03-27 ASSESSMENT — PAIN SCALES - GENERAL: PAINLEVEL: NO PAIN (0)

## 2023-03-27 NOTE — LETTER
3/27/2023       RE: Yola Amin  [address] [address]     Dear Colleague,    Thank you for referring your patient, Yola Amin, to the [facility] DERMATOLOGIC SURGERY CLINIC [place] at [facility]. Please see a copy of my visit note below.    Dermatologic Surgery Clinic Note - Teledermatology Visit    Mar 27, 2023  Start time: 3:30 p.m.  End time: 3:40 p.m.    Dermatology Problem List:  1. MIS, right temple, s/p [facility] 12/21/22    Subjective: The patient is a 62 year old woman who presents today for follow-up after recent dermatologic surgery/wound check. The patient has no concerns for surgical wound at this time. Pain is well controlled. No concern for infection.    Patient is experiencing some numbness at the site of her surgical scar. Additionally has a spot of concern on her right forearm.     No other associated symptoms, modifying factors, or prior treatments, except when noted above. The patient denies any constitutional symptoms, lymphadenopathy, unintentional weight loss or decreased appetite. No other skin concerns today.    Objective:   Skin: Focused examination of the right temple and right forearm within the teledermatology photograph(s) on 3/27/2023 was performed.   - There is a waxy stuck on tan to brown papule with erythema on the right forearm.   - Well healed transposition flap on the right temple.  - The surgical site noted above is clean, dry, and intact. There is no surrounding erythema, purulence, or significant tenderness to palpation. No clinical evidence of infection noted today.    Assessment and Plan:     # Scar, MIS, right temple, s/p [facility] 12/21/22  - The patient's surgery site(s) is/are healing very well. No evidence of infection on examination today.  - Discussed that numbness in surgical site is normal and generally improves 6-9 months following surgery. Some numbness may be permanent  - The  patient should follow up with dermatologic surgery PRN, as well as continue with regular skin exams in general dermatology clinic.    # Inflamed seborrheic keratosis, right forearm  - Reassured patient of benign nature, no treatment necessary  - Continue with regular skin exams in general dermatology clinic.    The patient was discussed with and evaluated by attending physician, Krish Francis MD.    Randy Castro MD  Dermatology, PGY-5  Mohs surgery fellow    Scribe Disclosure:  I, LAURA GERONIMO, am serving as a scribe to document services personally performed by Krish Francis MD based on data collection and the provider's statements to me.     Attending Attestation  I attest that I discussed the case with the Fellow.  I agree with the plan.   I have reviewed the note and edited it as necessary.    Krish Francis M.D.  Professor  Director of Dermatologic Surgery  Department of Dermatology  [facility]

## 2023-03-27 NOTE — NURSING NOTE
Chief Complaint   Patient presents with     Derm Problem     Patient is here today for 3 month scar eval regarding right temple. Reports numbness.     Hollie CHAMBERS RN

## 2023-03-27 NOTE — PROGRESS NOTES
Dermatologic Surgery Clinic Note - Teledermatology Visit    Mar 27, 2023  Start time: 3:30 p.m.  End time: 3:40 p.m.    Dermatology Problem List:  1. MIS, right temple, s/p [facility] 12/21/22    Subjective: The patient is a 62 year old woman who presents today for follow-up after recent dermatologic surgery/wound check. The patient has no concerns for surgical wound at this time. Pain is well controlled. No concern for infection.    Patient is experiencing some numbness at the site of her surgical scar. Additionally has a spot of concern on her right forearm.     No other associated symptoms, modifying factors, or prior treatments, except when noted above. The patient denies any constitutional symptoms, lymphadenopathy, unintentional weight loss or decreased appetite. No other skin concerns today.    Objective:   Skin: Focused examination of the right temple and right forearm within the teledermatology photograph(s) on 3/27/2023 was performed.   - There is a waxy stuck on tan to brown papule with erythema on the right forearm.   - Well healed transposition flap on the right temple.  - The surgical site noted above is clean, dry, and intact. There is no surrounding erythema, purulence, or significant tenderness to palpation. No clinical evidence of infection noted today.    Assessment and Plan:     # Scar, MIS, right temple, s/p [facility] 12/21/22  - The patient's surgery site(s) is/are healing very well. No evidence of infection on examination today.  - Discussed that numbness in surgical site is normal and generally improves 6-9 months following surgery. Some numbness may be permanent  - The patient should follow up with dermatologic surgery PRN, as well as continue with regular skin exams in general dermatology clinic.    # Inflamed seborrheic keratosis, right forearm  - Reassured patient of benign nature, no treatment necessary  - Continue with regular skin exams in general dermatology clinic.    The patient was discussed  with and evaluated by attending physician, Krish Francis MD.    Randy Castro MD  Dermatology, PGY-5  Mohs surgery fellow    Scribe Disclosure:  I, LAURA GERONIMO, am serving as a scribe to document services personally performed by Krish Francis MD based on data collection and the provider's statements to me.     Attending Attestation  I attest that I discussed the case with the Fellow.  I agree with the plan.   I have reviewed the note and edited it as necessary.    Krish Francis M.D.  Professor  Director of Dermatologic Surgery  Department of Dermatology  [facility]

## 2023-04-08 ENCOUNTER — HEALTH MAINTENANCE LETTER (OUTPATIENT)
Age: 63
End: 2023-04-08

## 2023-05-05 ASSESSMENT — ENCOUNTER SYMPTOMS
BREAST MASS: 0
NERVOUS/ANXIOUS: 1

## 2023-05-10 ENCOUNTER — OFFICE VISIT (OUTPATIENT)
Dept: FAMILY MEDICINE | Facility: CLINIC | Age: 63
End: 2023-05-10
Payer: COMMERCIAL

## 2023-05-10 VITALS
RESPIRATION RATE: 16 BRPM | HEIGHT: 65 IN | BODY MASS INDEX: 36.99 KG/M2 | WEIGHT: 222 LBS | OXYGEN SATURATION: 96 % | TEMPERATURE: 97.5 F | DIASTOLIC BLOOD PRESSURE: 80 MMHG | HEART RATE: 76 BPM | SYSTOLIC BLOOD PRESSURE: 142 MMHG

## 2023-05-10 DIAGNOSIS — N95.0 POSTMENOPAUSAL BLEEDING: ICD-10-CM

## 2023-05-10 DIAGNOSIS — E78.5 DYSLIPIDEMIA: ICD-10-CM

## 2023-05-10 DIAGNOSIS — F41.1 GENERALIZED ANXIETY DISORDER: ICD-10-CM

## 2023-05-10 DIAGNOSIS — H91.90 HEARING LOSS, UNSPECIFIED HEARING LOSS TYPE, UNSPECIFIED LATERALITY: ICD-10-CM

## 2023-05-10 DIAGNOSIS — Z00.00 ROUTINE GENERAL MEDICAL EXAMINATION AT A HEALTH CARE FACILITY: Primary | ICD-10-CM

## 2023-05-10 DIAGNOSIS — M85.80 AGE-RELATED BONE LOSS: ICD-10-CM

## 2023-05-10 DIAGNOSIS — I10 ESSENTIAL HYPERTENSION: ICD-10-CM

## 2023-05-10 DIAGNOSIS — Z12.4 CERVICAL CANCER SCREENING: ICD-10-CM

## 2023-05-10 DIAGNOSIS — N85.00 ENDOMETRIAL HYPERPLASIA: ICD-10-CM

## 2023-05-10 PROBLEM — Z01.89 NORMAL HYSTEROSCOPY: Status: ACTIVE | Noted: 2022-09-07

## 2023-05-10 PROBLEM — Z98.890 S/P MOHS SURGERY FOR BASAL CELL CARCINOMA: Status: ACTIVE | Noted: 2022-12-22

## 2023-05-10 PROBLEM — Z85.828 S/P MOHS SURGERY FOR BASAL CELL CARCINOMA: Status: ACTIVE | Noted: 2022-12-22

## 2023-05-10 PROCEDURE — 99214 OFFICE O/P EST MOD 30 MIN: CPT | Mod: 25 | Performed by: INTERNAL MEDICINE

## 2023-05-10 PROCEDURE — G0145 SCR C/V CYTO,THINLAYER,RESCR: HCPCS | Performed by: INTERNAL MEDICINE

## 2023-05-10 PROCEDURE — 87624 HPV HI-RISK TYP POOLED RSLT: CPT | Performed by: INTERNAL MEDICINE

## 2023-05-10 PROCEDURE — 99396 PREV VISIT EST AGE 40-64: CPT | Performed by: INTERNAL MEDICINE

## 2023-05-10 RX ORDER — LISINOPRIL 10 MG/1
10 TABLET ORAL DAILY
Qty: 90 TABLET | Refills: 1 | Status: SHIPPED | OUTPATIENT
Start: 2023-05-10 | End: 2023-11-01

## 2023-05-10 RX ORDER — ESCITALOPRAM OXALATE 10 MG/1
10 TABLET ORAL DAILY
Qty: 30 TABLET | Refills: 1 | Status: SHIPPED | OUTPATIENT
Start: 2023-05-10 | End: 2023-07-13

## 2023-05-10 ASSESSMENT — ANXIETY QUESTIONNAIRES
2. NOT BEING ABLE TO STOP OR CONTROL WORRYING: NEARLY EVERY DAY
GAD7 TOTAL SCORE: 21
3. WORRYING TOO MUCH ABOUT DIFFERENT THINGS: NEARLY EVERY DAY
1. FEELING NERVOUS, ANXIOUS, OR ON EDGE: NEARLY EVERY DAY
IF YOU CHECKED OFF ANY PROBLEMS ON THIS QUESTIONNAIRE, HOW DIFFICULT HAVE THESE PROBLEMS MADE IT FOR YOU TO DO YOUR WORK, TAKE CARE OF THINGS AT HOME, OR GET ALONG WITH OTHER PEOPLE: NOT DIFFICULT AT ALL
6. BECOMING EASILY ANNOYED OR IRRITABLE: NEARLY EVERY DAY
5. BEING SO RESTLESS THAT IT IS HARD TO SIT STILL: NEARLY EVERY DAY
GAD7 TOTAL SCORE: 21
7. FEELING AFRAID AS IF SOMETHING AWFUL MIGHT HAPPEN: NEARLY EVERY DAY

## 2023-05-10 ASSESSMENT — ENCOUNTER SYMPTOMS
BREAST MASS: 0
NERVOUS/ANXIOUS: 1

## 2023-05-10 ASSESSMENT — PATIENT HEALTH QUESTIONNAIRE - PHQ9: 5. POOR APPETITE OR OVEREATING: NEARLY EVERY DAY

## 2023-05-10 ASSESSMENT — PAIN SCALES - GENERAL: PAINLEVEL: NO PAIN (0)

## 2023-05-10 NOTE — PROGRESS NOTES
SUBJECTIVE:   CC: Yola is an 63 year old who presents for preventive health visit.          View : No data to display.            Patient has been advised of split billing requirements and indicates understanding: Yes  Healthy Habits:     Getting at least 3 servings of Calcium per day:  NO    Bi-annual eye exam:  Yes    Dental care twice a year:  NO    Sleep apnea or symptoms of sleep apnea:  None    Diet:  Regular (no restrictions)    Frequency of exercise:  1 day/week    Duration of exercise:  15-30 minutes    Taking medications regularly:  Yes    Medication side effects:  Not applicable    PHQ-2 Total Score: 2    Additional concerns today:  No      Today's PHQ-2 Score:       2023     4:42 PM   PHQ-2 (  [employer])   Q1: Little interest or pleasure in doing things 1   Q2: Feeling down, depressed or hopeless 1   PHQ-2 Score 2   Q1: Little interest or pleasure in doing things Several days   Q2: Feeling down, depressed or hopeless Several days   PHQ-2 Score 2       Have you ever done Advance Care Planning? (For example, a Health Directive, POLST, or a discussion with a medical provider or your loved ones about your wishes): No, advance care planning information given to patient to review.  Patient declined advance care planning discussion at this time.    Social History     Tobacco Use     Smoking status: Never     Smokeless tobacco: Never   Vaping Use     Vaping status: Not on file   Substance Use Topics     Alcohol use: Yes     Alcohol/week: 3.0 standard drinks of alcohol     Comment: 6-7 drinks per week             2023     4:42 PM   Alcohol Use   Prescreen: >3 drinks/day or >7 drinks/week? No          View : No data to display.              Reviewed orders with patient.  Reviewed health maintenance and updated orders accordingly - Yes  Lab work is in process  Labs reviewed in EPIC    Breast Cancer Screenin/5/2023     4:44 PM   Breast CA Risk Assessment (FHS-7)   Do you have a family  history of breast, colon, or ovarian cancer? No / Unknown       click delete button to remove this line now  Mammogram Screening: Recommended mammography every 1-2 years with patient discussion and risk factor consideration  Pertinent mammograms are reviewed under the imaging tab.    History of abnormal Pap smear: NO - age 30-65 PAP every 5 years with negative HPV co-testing recommended      Latest Ref Rng & Units 2017    11:12 AM 2017    10:33 AM 2012     8:00 AM   PAP / HPV   PAP (Historical)   NIL   NIL     HPV 16 DNA NEG^Negative Negative       HPV 18 DNA NEG^Negative Negative       Other HR HPV NEG^Negative Negative         Reviewed and updated as needed this visit by clinical staff   Tobacco  Allergies  Meds  Problems  Med Hx  Surg Hx  Fam Hx          Reviewed and updated as needed this visit by Provider   Tobacco  Allergies  Meds  Problems  Med Hx  Surg Hx  Fam Hx         Past Medical History:   Diagnosis Date     Allergic rhinitis      Hearing problem early     Hearing loss for high-pitched tones     Reduced vision     visual migraines     Seasonal allergies      Tinnitus as long as I can remember    constant, but ignorable most of the time      Past Surgical History:   Procedure Laterality Date     BIOPSY  2022 & Dec 2022    Dec 22: skin from right temple following Moh's procedure; Oct 22: mole on forehead     bone marrow donation       COLONOSCOPY  Spring 2017??     CYSTECTOMY OVARIAN BENIGN      left     ENDOCERVICAL CURETTAGE       MAMMOPLASTY REDUCTION BILATERAL Bilateral 2018    Procedure: MAMMOPLASTY REDUCTION BILATERAL;  Bilateral Breast Reduction;  Surgeon: AKIRA Vizcarra MD;  Location:  OR     OB History    Para Term  AB Living   0 0 0 0 0 0   SAB IAB Ectopic Multiple Live Births   0 0 0 0 0       Review of Systems   Breasts:  Negative for tenderness, breast mass and discharge.   Genitourinary: Negative for pelvic  "pain, vaginal bleeding and vaginal discharge.   Psychiatric/Behavioral: The patient is nervous/anxious.      CONSTITUTIONAL: NEGATIVE for fever, chills, change in weight  INTEGUMENTARY/SKIN: NEGATIVE for worrisome rashes, moles or lesions  EYES: NEGATIVE for vision changes or irritation  ENT: NEGATIVE for ear, mouth and throat problems  RESP: NEGATIVE for significant cough or SOB  BREAST: NEGATIVE for masses, tenderness or discharge  CV: NEGATIVE for chest pain, palpitations or peripheral edema  GI: NEGATIVE for nausea, abdominal pain, heartburn, or change in bowel habits  : NEGATIVE for unusual urinary or vaginal symptoms. No vaginal bleeding.  MUSCULOSKELETAL: NEGATIVE for significant arthralgias or myalgia  NEURO: NEGATIVE for weakness, dizziness or paresthesias  PSYCHIATRIC: NEGATIVE for changes in mood or affect      OBJECTIVE:   BP (!) 142/80   Pulse 76   Temp 97.5  F (36.4  C) (Temporal)   Resp 16   Ht 1.651 m (5' 5\")   Wt 100.7 kg (222 lb)   LMP 02/01/2015   SpO2 96%   BMI 36.94 kg/m    Physical Exam  GENERAL APPEARANCE: healthy, alert and no distress  EYES: Eyes grossly normal to inspection, PERRL and conjunctivae and sclerae normal  HENT: ear canals and TM's normal, nose and mouth without ulcers or lesions, oropharynx clear and oral mucous membranes moist  NECK: no adenopathy, no asymmetry, masses, or scars and thyroid normal to palpation  RESP: lungs clear to auscultation - no rales, rhonchi or wheezes  BREAST: Defered , Mammogram  CV: regular rate and rhythm, normal S1 S2, no S3 or S4, no murmur, click or rub, no peripheral edema and peripheral pulses strong  ABDOMEN: soft, nontender, no hepatosplenomegaly, no masses and bowel sounds normal  MS: no musculoskeletal defects are noted and gait is age appropriate without ataxia  SKIN: no suspicious lesions or rashes  NEURO: Normal strength and tone, sensory exam grossly normal, mentation intact and speech normal  PSYCH: mentation appears normal and " affect normal/bright    Pelvic Exam:  Vulva: No external lesions, normal hair distribution, no adenopathy  Vagina: Moist, pink, no abnormal discharge, well rugated, no lesions  Cervix: Pap smear is taken, parous, smooth, pink, no visible lesions  Uterus: Normal size, anteverted, non-tender, mobile  Ovaries: No mass, non-tender, mobile    Diagnostic Test Results:  Labs reviewed in Epic    ASSESSMENT/PLAN:         Assessment and Plan  1. Routine general medical examination at a health care facility  Last seen patient in August 2022 for postmenopausal bleeding at that time.  She is here for annual physical, Pap smear check for gynecology if she has.  Last lab work in August 2022 showing normal CMP, CBC and dyslipidemia with LDL at 160.  - Pap Screen with HPV - recommended age 30 - 65 years  - Comprehensive metabolic panel (BMP + Alb, Alk Phos, ALT, AST, Total. Bili, TP); Future  - CBC with platelets; Future  - Lipid panel reflex to direct LDL Fasting; Future    2. Essential hypertension  New problem, added to pt problem list for persistently elevated blood pressures and on my recheck remaining high , shared decision to start off on Lisinopril . Given instructions to get me the BP log for next 1 week for further titration of the medication dose which pt understood and agreed.   - lisinopril (ZESTRIL) 10 MG tablet; Take 1 tablet (10 mg) by mouth daily  Dispense: 90 tablet; Refill: 1    3. Generalized anxiety disorder  New problem, Pt states that she does have stress at work and plans of possible need of time off related to this. Shared decision for psychiatry referral and start off on a medication.. Abnormal LORIN score in office today, pt to submit E-visit in 4 weeks for further titration on dosage which she understood and agreed with the plan.    - Adult Mental Health  Referral; Future  - Comprehensive metabolic panel (BMP + Alb, Alk Phos, ALT, AST, Total. Bili, TP); Future  - CBC with platelets; Future  -  escitalopram (LEXAPRO) 10 MG tablet; Take 1 tablet (10 mg) by mouth daily  Dispense: 30 tablet; Refill: 1  - TSH with free T4 reflex; Future    4. Dyslipidemia  Uncontrolled , started on Statin for improvement.   - Lipid panel reflex to direct LDL Fasting; Future  - simvastatin (ZOCOR) 10 MG tablet; Take 1 tablet (10 mg) by mouth At Bedtime  Dispense: 90 tablet; Refill: 1    5. Hearing loss, unspecified hearing loss type, unspecified laterality  Ongoing problem, she was recommended hearing aids which she has not yet considered. Offered her again which she declined, all risks understood..     6. Postmenopausal bleeding  7. Endometrial hyperplasia  S/P Endometrial ablation , improved at this time. To follow up with OBGYN.     8. Age-related bone loss  - Vitamin D deficiency screening; Future    9. Cervical cancer screening  - Pap Screen with HPV - recommended age 30 - 65 years     Patient Instructions   Please make a fasting LAB only appointment .    Started on anxiety medication for improvement.   Please keep up appointment with Psychiatry on the referral placed.     =====================================  \[id number]  Preventive Health Recommendations  Female Ages 50 - 64    Yearly exam: See your health care provider every year in order to  o Review health changes.   o Discuss preventive care.    o Review your medicines if your doctor has prescribed any.      Get a Pap test every three years (unless you have an abnormal result and your provider advises testing more often).    If you get Pap tests with HPV test, you only need to test every 5 years, unless you have an abnormal result.     You do not need a Pap test if your uterus was removed (hysterectomy) and you have not had cancer.    You should be tested each year for STDs (sexually transmitted diseases) if you're at risk.     Have a mammogram every 1 to 2 years.    Have a colonoscopy at age 50, or have a yearly FIT test (stool test). These exams  screen for colon cancer.      Have a cholesterol test every 5 years, or more often if advised.    Have a diabetes test (fasting glucose) every three years. If you are at risk for diabetes, you should have this test more often.     If you are at risk for osteoporosis (brittle bone disease), think about having a bone density scan (DEXA).    Shots: Get a flu shot each year. Get a tetanus shot every 10 years.    Nutrition:     Eat at least 5 servings of fruits and vegetables each day.    Eat whole-grain bread, whole-wheat pasta and brown rice instead of white grains and rice.    Get adequate Calcium and Vitamin D.     Lifestyle    Exercise at least 150 minutes a week (30 minutes a day, 5 days a week). This will help you control your weight and prevent disease.    Limit alcohol to one drink per day.    No smoking.     Wear sunscreen to prevent skin cancer.     See your dentist every six months for an exam and cleaning.    See your eye doctor every 1 to 2 years.      Return in about 4 weeks (around 6/7/2023), or if symptoms worsen or fail to improve, for E-Visit, anxiety.    Queenie Carcamo MD  [facility]      Patient has been advised of split billing requirements and indicates understanding: Yes      COUNSELING:  Reviewed preventive health counseling, as reflected in patient instructions  Special attention given to:        Regular exercise       Healthy diet/nutrition       Vision screening       Hearing screening       Alcohol Use       Osteoporosis prevention/bone health       (Rosemary)menopause management        She reports that she has never smoked. She has never used smokeless tobacco.      Queenie Carcamo MD  [facility]

## 2023-05-10 NOTE — PATIENT INSTRUCTIONS
Please make a fasting LAB only appointment .    Started on anxiety medication for improvement.   Please keep up appointment with Psychiatry on the referral placed.     =====================================  \[id number]\  Preventive Health Recommendations  Female Ages 50 - 64    Yearly exam: See your health care provider every year in order to  Review health changes.   Discuss preventive care.    Review your medicines if your doctor has prescribed any.    Get a Pap test every three years (unless you have an abnormal result and your provider advises testing more often).  If you get Pap tests with HPV test, you only need to test every 5 years, unless you have an abnormal result.   You do not need a Pap test if your uterus was removed (hysterectomy) and you have not had cancer.  You should be tested each year for STDs (sexually transmitted diseases) if you're at risk.   Have a mammogram every 1 to 2 years.  Have a colonoscopy at age 50, or have a yearly FIT test (stool test). These exams screen for colon cancer.    Have a cholesterol test every 5 years, or more often if advised.  Have a diabetes test (fasting glucose) every three years. If you are at risk for diabetes, you should have this test more often.   If you are at risk for osteoporosis (brittle bone disease), think about having a bone density scan (DEXA).    Shots: Get a flu shot each year. Get a tetanus shot every 10 years.    Nutrition:   Eat at least 5 servings of fruits and vegetables each day.  Eat whole-grain bread, whole-wheat pasta and brown rice instead of white grains and rice.  Get adequate Calcium and Vitamin D.     Lifestyle  Exercise at least 150 minutes a week (30 minutes a day, 5 days a week). This will help you control your weight and prevent disease.  Limit alcohol to one drink per day.  No smoking.   Wear sunscreen to prevent skin cancer.   See your dentist every six months for an exam and cleaning.  See your eye doctor every 1 to  2 years.

## 2023-05-12 LAB
BKR LAB AP GYN ADEQUACY: NORMAL
BKR LAB AP GYN INTERPRETATION: NORMAL
BKR LAB AP HPV REFLEX: NORMAL
BKR LAB AP PREVIOUS ABNORMAL: NORMAL
PATH REPORT.COMMENTS IMP SPEC: NORMAL
PATH REPORT.COMMENTS IMP SPEC: NORMAL
PATH REPORT.RELEVANT HX SPEC: NORMAL

## 2023-05-16 LAB
HUMAN PAPILLOMA VIRUS 16 DNA: NEGATIVE
HUMAN PAPILLOMA VIRUS 18 DNA: NEGATIVE
HUMAN PAPILLOMA VIRUS FINAL DIAGNOSIS: NORMAL
HUMAN PAPILLOMA VIRUS OTHER HR: NEGATIVE

## 2023-05-24 DIAGNOSIS — F41.1 GENERALIZED ANXIETY DISORDER: Primary | ICD-10-CM

## 2023-06-01 ENCOUNTER — LAB (OUTPATIENT)
Dept: LAB | Facility: CLINIC | Age: 63
End: 2023-06-01
Payer: COMMERCIAL

## 2023-06-01 DIAGNOSIS — E78.5 DYSLIPIDEMIA: ICD-10-CM

## 2023-06-01 DIAGNOSIS — Z00.00 ROUTINE GENERAL MEDICAL EXAMINATION AT A HEALTH CARE FACILITY: ICD-10-CM

## 2023-06-01 DIAGNOSIS — M85.80 AGE-RELATED BONE LOSS: ICD-10-CM

## 2023-06-01 DIAGNOSIS — F41.1 GENERALIZED ANXIETY DISORDER: ICD-10-CM

## 2023-06-01 LAB
ALBUMIN SERPL BCG-MCNC: 4.3 G/DL (ref 3.5–5.2)
ALP SERPL-CCNC: 55 U/L (ref 35–104)
ALT SERPL W P-5'-P-CCNC: 37 U/L (ref 10–35)
ANION GAP SERPL CALCULATED.3IONS-SCNC: 14 MMOL/L (ref 7–15)
AST SERPL W P-5'-P-CCNC: 27 U/L (ref 10–35)
BASOPHILS # BLD AUTO: 0 10E3/UL (ref 0–0.2)
BASOPHILS NFR BLD AUTO: 0 %
BILIRUB SERPL-MCNC: 0.3 MG/DL
BUN SERPL-MCNC: 14.4 MG/DL (ref 8–23)
CALCIUM SERPL-MCNC: 9.8 MG/DL (ref 8.8–10.2)
CHLORIDE SERPL-SCNC: 106 MMOL/L (ref 98–107)
CHOLEST SERPL-MCNC: 219 MG/DL
CREAT SERPL-MCNC: 1 MG/DL (ref 0.51–0.95)
CRP SERPL-MCNC: <3 MG/L
DEPRECATED CALCIDIOL+CALCIFEROL SERPL-MC: 36 UG/L (ref 20–75)
DEPRECATED HCO3 PLAS-SCNC: 24 MMOL/L (ref 22–29)
EOSINOPHIL # BLD AUTO: 0.1 10E3/UL (ref 0–0.7)
EOSINOPHIL NFR BLD AUTO: 2 %
ERYTHROCYTE [DISTWIDTH] IN BLOOD BY AUTOMATED COUNT: 14.4 % (ref 10–15)
FERRITIN SERPL-MCNC: 86 NG/ML (ref 11–328)
GFR SERPL CREATININE-BSD FRML MDRD: 63 ML/MIN/1.73M2
GLUCOSE SERPL-MCNC: 104 MG/DL (ref 70–99)
HCT VFR BLD AUTO: 44 % (ref 35–47)
HDLC SERPL-MCNC: 55 MG/DL
HGB BLD-MCNC: 14.2 G/DL (ref 11.7–15.7)
IMM GRANULOCYTES # BLD: 0 10E3/UL
IMM GRANULOCYTES NFR BLD: 0 %
IRON BINDING CAPACITY (ROCHE): 316 UG/DL (ref 240–430)
IRON SATN MFR SERPL: 17 % (ref 15–46)
IRON SERPL-MCNC: 53 UG/DL (ref 37–145)
LDLC SERPL CALC-MCNC: 146 MG/DL
LYMPHOCYTES # BLD AUTO: 1.4 10E3/UL (ref 0.8–5.3)
LYMPHOCYTES NFR BLD AUTO: 19 %
MCH RBC QN AUTO: 27 PG (ref 26.5–33)
MCHC RBC AUTO-ENTMCNC: 32.3 G/DL (ref 31.5–36.5)
MCV RBC AUTO: 84 FL (ref 78–100)
MONOCYTES # BLD AUTO: 0.4 10E3/UL (ref 0–1.3)
MONOCYTES NFR BLD AUTO: 5 %
NEUTROPHILS # BLD AUTO: 5.3 10E3/UL (ref 1.6–8.3)
NEUTROPHILS NFR BLD AUTO: 74 %
NONHDLC SERPL-MCNC: 164 MG/DL
PLATELET # BLD AUTO: 278 10E3/UL (ref 150–450)
POTASSIUM SERPL-SCNC: 4.5 MMOL/L (ref 3.4–5.3)
PROT SERPL-MCNC: 7.1 G/DL (ref 6.4–8.3)
RBC # BLD AUTO: 5.25 10E6/UL (ref 3.8–5.2)
SODIUM SERPL-SCNC: 144 MMOL/L (ref 136–145)
T3FREE SERPL-MCNC: 2.9 PG/ML (ref 2–4.4)
T4 FREE SERPL-MCNC: 1.16 NG/DL (ref 0.9–1.7)
TRIGL SERPL-MCNC: 91 MG/DL
TSH SERPL DL<=0.005 MIU/L-ACNC: 0.95 UIU/ML (ref 0.3–4.2)
WBC # BLD AUTO: 7.1 10E3/UL (ref 4–11)

## 2023-06-01 PROCEDURE — 84439 ASSAY OF FREE THYROXINE: CPT

## 2023-06-01 PROCEDURE — 83550 IRON BINDING TEST: CPT

## 2023-06-01 PROCEDURE — 86140 C-REACTIVE PROTEIN: CPT

## 2023-06-01 PROCEDURE — 80050 GENERAL HEALTH PANEL: CPT

## 2023-06-01 PROCEDURE — 84481 FREE ASSAY (FT-3): CPT

## 2023-06-01 PROCEDURE — 82728 ASSAY OF FERRITIN: CPT

## 2023-06-01 PROCEDURE — 82306 VITAMIN D 25 HYDROXY: CPT

## 2023-06-01 PROCEDURE — 80061 LIPID PANEL: CPT

## 2023-06-01 PROCEDURE — 36415 COLL VENOUS BLD VENIPUNCTURE: CPT

## 2023-06-01 PROCEDURE — 83540 ASSAY OF IRON: CPT

## 2023-06-01 PROCEDURE — 99207 25 HYDROXYVITAMIN D2 & D3: CPT

## 2023-06-01 RX ORDER — SIMVASTATIN 10 MG
10 TABLET ORAL AT BEDTIME
Qty: 90 TABLET | Refills: 1 | Status: SHIPPED | OUTPATIENT
Start: 2023-06-01 | End: 2023-12-07

## 2023-06-05 LAB
DEPRECATED CALCIDIOL+CALCIFEROL SERPL-MC: <49 UG/L (ref 20–75)
VITAMIN D2 SERPL-MCNC: <5 UG/L
VITAMIN D3 SERPL-MCNC: 44 UG/L

## 2023-07-10 NOTE — MR AVS SNAPSHOT
After Visit Summary   4/13/2018    Yola Amin    MRN: [id number]           Patient Information     Date Of Birth          1960        Visit Information        Provider Department      4/13/2018 3:00 PM AKIRA Vizcarra MD [facility] [place]        Today's Diagnoses     S/P bilateral breast reduction    -  1       Follow-ups after your visit        Follow-up notes from your care team     Return in about 5 days (around 4/18/2018).      Your next 10 appointments already scheduled     Apr 17, 2018 11:15 AM CDT   (Arrive by 11:00 AM)   Return Visit with AKIRA Vizcarra MD   [facility] ([facility])    [address]  [address]  [place] [address] [phone]              Who to contact     If you have questions or need follow up information about today's clinic visit or your schedule please contact [facility] directly at [phone].  Normal or non-critical lab and imaging results will be communicated to you by [employer], letter or phone within 4 business days after the clinic has received the results. If you do not hear from us within 7 days, please contact the clinic through [employer] or phone. If you have a critical or abnormal lab result, we will notify you by phone as soon as possible.  Submit refill requests through [employer] or call your pharmacy and they will forward the refill request to us. Please allow 3 business days for your refill to be completed.          Additional Information About Your Visit        [employer] Information     [employer] gives you secure access to your electronic health record. If you see a primary care provider, you can also send messages to your care team and make appointments. If you have questions, please call your primary care clinic.  If you do not have a primary care provider, please call [phone] and they will assist you.      [employer] is an electronic gateway that provides  "Subjective:       Patient ID: Klever Link is a 78 y.o. male.    Vitals:  height is 6' 3" (1.905 m) and weight is 84.8 kg (187 lb). His temperature is 98.2 °F (36.8 °C). His blood pressure is 134/75 and his pulse is 112 (abnormal). His respiration is 18 and oxygen saturation is 95%.     Chief Complaint: Sore Throat    Pt complains x5 days of sore throat, post nasal drip, nasal congestion. Took mucinex drops with mild relief.     Sore Throat   This is a new problem. The current episode started in the past 7 days. The problem has been unchanged. There has been no fever. The pain is at a severity of 0/10. The patient is experiencing no pain. Associated symptoms include congestion and trouble swallowing. Pertinent negatives include no abdominal pain, coughing, diarrhea, drooling, ear discharge, ear pain, headaches, hoarse voice, plugged ear sensation, neck pain, shortness of breath, stridor, swollen glands or vomiting.     HENT:  Positive for congestion, sore throat and trouble swallowing. Negative for ear pain, ear discharge and drooling.    Neck: Negative for neck pain.   Respiratory:  Negative for cough, shortness of breath and stridor.    Gastrointestinal:  Negative for abdominal pain, vomiting and diarrhea.   Neurological:  Negative for headaches.     Objective:      Vitals:    12/17/22 1009   BP: 134/75   Pulse: (!) 112   Resp: 18   Temp: 98.2 °F (36.8 °C)   SpO2: 95%   Weight: 84.8 kg (187 lb)   Height: 6' 3" (1.905 m)      Physical Exam   Constitutional: He is oriented to person, place, and time. He appears well-developed. He is cooperative.  Non-toxic appearance. He does not appear ill. No distress.   HENT:   Head: Normocephalic and atraumatic.   Ears:   Right Ear: Hearing, tympanic membrane, external ear and ear canal normal.   Left Ear: Hearing, tympanic membrane, external ear and ear canal normal.   Nose: Congestion present. No mucosal edema, rhinorrhea or nasal deformity. No epistaxis. Right sinus " easy, online access to your medical records. With [employer], you can request a clinic appointment, read your test results, renew a prescription or communicate with your care team.     To access your existing account, please contact your [facility] or call [phone] for assistance.        Care EveryWhere ID     This is your Care EveryWhere ID. This could be used by other organizations to access your [facility] medical records  [id number]        Your Vitals Were     Last Period                   02/01/2015            Blood Pressure from Last 3 Encounters:   04/03/18 120/79   03/22/18 (!) 140/91   03/08/18 112/77    Weight from Last 3 Encounters:   04/03/18 200 lb 8 oz   03/22/18 205 lb 12.8 oz   03/08/18 205 lb 8 oz              Today, you had the following     No orders found for display       Primary Care Provider Office Phone # Fax #    Falguni Kasper PA-C [phone]       [address] [place] [id number]        Equal Access to Services     LAKESHIA WILLIS : Hadii aad ku hadasho Soomaali, waaxda luqadaha, qaybta kaalmada adeegyada, waxay idiin hayalaina ren . So [facility] [phone].    ATENCIÓN: Si habla español, tiene a augustine disposición servicios gratuitos de asistencia lingüística. [facility] [phone].    We comply with applicable federal civil rights laws and [place] laws. We do not discriminate on the basis of race, color, national origin, age, disability, sex, sexual orientation, or gender identity.            Thank you!     Thank you for choosing [facility]  for your care. Our goal is always to provide you with excellent care. Hearing back from our patients is one way we can continue to improve our services. Please take a few minutes to complete the written survey that you may receive in the mail after your visit with us. Thank you!             Your Updated Medication List - Protect others around you: Learn how to  exhibits no maxillary sinus tenderness and no frontal sinus tenderness. Left sinus exhibits no maxillary sinus tenderness and no frontal sinus tenderness.   Mouth/Throat: Uvula is midline, oropharynx is clear and moist and mucous membranes are normal. No trismus in the jaw. Normal dentition. No uvula swelling. No oropharyngeal exudate, posterior oropharyngeal edema or posterior oropharyngeal erythema.   Eyes: Conjunctivae and lids are normal. No scleral icterus.   Neck: Trachea normal and phonation normal. Neck supple. No edema present. No erythema present. No neck rigidity present.   Cardiovascular: Normal rate, regular rhythm, normal heart sounds and normal pulses.   Pulmonary/Chest: Effort normal and breath sounds normal. No respiratory distress. He has no decreased breath sounds. He has no rhonchi.   Abdominal: Normal appearance.   Musculoskeletal: Normal range of motion.         General: No deformity. Normal range of motion.   Neurological: He is alert and oriented to person, place, and time. He exhibits normal muscle tone. Coordination normal.   Skin: Skin is warm, dry, intact, not diaphoretic and not pale.   Psychiatric: His speech is normal and behavior is normal. Judgment and thought content normal.   Nursing note and vitals reviewed.      Results for orders placed or performed in visit on 12/17/22   POCT COVID-19 Rapid Screening   Result Value Ref Range    POC Rapid COVID Negative Negative     Acceptable Yes       Assessment:       1. Sore throat    2. Bacterial URI    3. Postnasal drip          Plan:         Sore throat  -     POCT COVID-19 Rapid Screening  -     fluticasone propionate (FLONASE) 50 mcg/actuation nasal spray; 2 sprays (100 mcg total) by Each Nostril route 2 (two) times daily as needed for Rhinitis.  Dispense: 9.9 mL; Refill: 0    2. Bacterial URI  -     amoxicillin-clavulanate 875-125mg (AUGMENTIN) 875-125 mg per tablet; Take 1 tablet by mouth every 12 (twelve) hours. for 10  days  Dispense: 20 tablet; Refill: 0  -     fluticasone propionate (FLONASE) 50 mcg/actuation nasal spray; 2 sprays (100 mcg total) by Each Nostril route 2 (two) times daily as needed for Rhinitis.  Dispense: 9.9 mL; Refill: 0  -     cetirizine (ZYRTEC) 10 MG tablet; Take 1 tablet (10 mg total) by mouth every evening. for 14 days  Dispense: 14 tablet; Refill: 0    3. Postnasal drip  -     fluticasone propionate (FLONASE) 50 mcg/actuation nasal spray; 2 sprays (100 mcg total) by Each Nostril route 2 (two) times daily as needed for Rhinitis.  Dispense: 9.9 mL; Refill: 0    Patient Instructions   Follow up with primary care provider if no improvement; may return to urgent care as needed    Seek immediate care in the emergency room in the event of severe abdominal pain, chest pain, respiratory distress, fever unresponsive to antipyretic, dehydration, loss of consciousness, seizure.                    safely use, store and throw away your medicines at www.disposemymeds.org.          This list is accurate as of 4/13/18  4:18 PM.  Always use your most recent med list.                   Brand Name Dispense Instructions for use Diagnosis    fexofenadine 180 MG tablet    ALLEGRA     Take 1 tablet by mouth daily.        GLUCOSAMINE CHONDR COMPLEX PO      Take 1 capsule by mouth daily        IBUPROFEN PO      Take 200 mg by mouth every 4 hours as needed for moderate pain        multivitamin per tablet      Take 1 tablet by mouth daily.           Total Volume Injected (Ccs- Only Use Numbers And Decimals): .2

## 2023-07-13 DIAGNOSIS — F41.1 GENERALIZED ANXIETY DISORDER: ICD-10-CM

## 2023-07-13 RX ORDER — ESCITALOPRAM OXALATE 10 MG/1
TABLET ORAL
Qty: 30 TABLET | Refills: 1 | Status: SHIPPED | OUTPATIENT
Start: 2023-07-13 | End: 2023-12-07

## 2023-09-07 ENCOUNTER — MYC MEDICAL ADVICE (OUTPATIENT)
Dept: FAMILY MEDICINE | Facility: CLINIC | Age: 63
End: 2023-09-07
Payer: COMMERCIAL

## 2023-09-07 DIAGNOSIS — Z29.11 ENCOUNTER FOR PROPHYLACTIC IMMUNOTHERAPY FOR RESPIRATORY SYNCYTIAL VIRUS (RSV): Primary | ICD-10-CM

## 2023-11-01 DIAGNOSIS — I10 ESSENTIAL HYPERTENSION: ICD-10-CM

## 2023-11-01 RX ORDER — LISINOPRIL 10 MG/1
10 TABLET ORAL DAILY
Qty: 30 TABLET | Refills: 0 | Status: SHIPPED | OUTPATIENT
Start: 2023-11-01 | End: 2023-12-06

## 2023-11-02 NOTE — TELEPHONE ENCOUNTER
Short refill done. We will need to recheck pt BP and labs before continuing Lisinopril. This was emphasized with 4 weeks follow up with me in pt last Annual physical in 5/2023 when we started the BP medication.     Please call the pt and schedule inperson for BP follow up . OK to double book at 1 PM in 1-2 weeks.   SO that we can confirm BP , Renal function check which was mildly low last visit and we can give 90days supplies till next Physical if everything normal.     Thank you  Queenie Carcamo MD on 11/1/2023

## 2023-11-13 NOTE — TELEPHONE ENCOUNTER
Patient has seen my chart message Last read by Yola Amin at  7:31 AM on 11/4/2023. No appointment made at this time

## 2023-12-06 DIAGNOSIS — I10 ESSENTIAL HYPERTENSION: ICD-10-CM

## 2023-12-06 RX ORDER — LISINOPRIL 10 MG/1
10 TABLET ORAL DAILY
Qty: 30 TABLET | Refills: 0 | Status: SHIPPED | OUTPATIENT
Start: 2023-12-06 | End: 2023-12-07

## 2023-12-07 ENCOUNTER — OFFICE VISIT (OUTPATIENT)
Dept: FAMILY MEDICINE | Facility: CLINIC | Age: 63
End: 2023-12-07
Payer: COMMERCIAL

## 2023-12-07 VITALS
BODY MASS INDEX: 38.41 KG/M2 | RESPIRATION RATE: 20 BRPM | WEIGHT: 230.8 LBS | SYSTOLIC BLOOD PRESSURE: 122 MMHG | OXYGEN SATURATION: 97 % | HEART RATE: 61 BPM | TEMPERATURE: 97.6 F | DIASTOLIC BLOOD PRESSURE: 86 MMHG

## 2023-12-07 DIAGNOSIS — L98.9 SKIN LESION: ICD-10-CM

## 2023-12-07 DIAGNOSIS — I10 ESSENTIAL HYPERTENSION: ICD-10-CM

## 2023-12-07 DIAGNOSIS — E78.5 DYSLIPIDEMIA: Primary | ICD-10-CM

## 2023-12-07 LAB
ALBUMIN SERPL BCG-MCNC: 4.5 G/DL (ref 3.5–5.2)
ALP SERPL-CCNC: 52 U/L (ref 40–150)
ALT SERPL W P-5'-P-CCNC: 30 U/L (ref 0–50)
ANION GAP SERPL CALCULATED.3IONS-SCNC: 10 MMOL/L (ref 7–15)
AST SERPL W P-5'-P-CCNC: 23 U/L (ref 0–45)
BILIRUB SERPL-MCNC: 0.4 MG/DL
BUN SERPL-MCNC: 14.2 MG/DL (ref 8–23)
CALCIUM SERPL-MCNC: 9.7 MG/DL (ref 8.8–10.2)
CHLORIDE SERPL-SCNC: 102 MMOL/L (ref 98–107)
CHOLEST SERPL-MCNC: 175 MG/DL
CREAT SERPL-MCNC: 0.89 MG/DL (ref 0.51–0.95)
DEPRECATED HCO3 PLAS-SCNC: 26 MMOL/L (ref 22–29)
EGFRCR SERPLBLD CKD-EPI 2021: 73 ML/MIN/1.73M2
FASTING STATUS PATIENT QL REPORTED: YES
GLUCOSE SERPL-MCNC: 82 MG/DL (ref 70–99)
HDLC SERPL-MCNC: 57 MG/DL
LDLC SERPL CALC-MCNC: 100 MG/DL
NONHDLC SERPL-MCNC: 118 MG/DL
POTASSIUM SERPL-SCNC: 4.3 MMOL/L (ref 3.4–5.3)
PROT SERPL-MCNC: 7.5 G/DL (ref 6.4–8.3)
SODIUM SERPL-SCNC: 138 MMOL/L (ref 135–145)
TRIGL SERPL-MCNC: 89 MG/DL

## 2023-12-07 PROCEDURE — 36415 COLL VENOUS BLD VENIPUNCTURE: CPT | Performed by: INTERNAL MEDICINE

## 2023-12-07 PROCEDURE — 80053 COMPREHEN METABOLIC PANEL: CPT | Performed by: INTERNAL MEDICINE

## 2023-12-07 PROCEDURE — 99214 OFFICE O/P EST MOD 30 MIN: CPT | Performed by: INTERNAL MEDICINE

## 2023-12-07 PROCEDURE — 80061 LIPID PANEL: CPT | Performed by: INTERNAL MEDICINE

## 2023-12-07 RX ORDER — ESCITALOPRAM OXALATE 20 MG/1
TABLET ORAL
COMMUNITY
Start: 2023-07-17

## 2023-12-07 RX ORDER — PROPRANOLOL HYDROCHLORIDE 10 MG/1
TABLET ORAL
COMMUNITY
Start: 2023-06-29

## 2023-12-07 RX ORDER — SIMVASTATIN 10 MG
10 TABLET ORAL AT BEDTIME
Qty: 90 TABLET | Refills: 1 | Status: SHIPPED | OUTPATIENT
Start: 2023-12-07 | End: 2024-06-04

## 2023-12-07 RX ORDER — LISINOPRIL 10 MG/1
10 TABLET ORAL DAILY
Qty: 30 TABLET | Refills: 0 | Status: SHIPPED | OUTPATIENT
Start: 2023-12-07 | End: 2023-12-07

## 2023-12-07 RX ORDER — HYDROXYZINE HYDROCHLORIDE 10 MG/1
TABLET, FILM COATED ORAL
COMMUNITY
Start: 2023-05-18

## 2023-12-07 RX ORDER — LISINOPRIL 10 MG/1
10 TABLET ORAL DAILY
Qty: 90 TABLET | Refills: 1 | Status: SHIPPED | OUTPATIENT
Start: 2023-12-07 | End: 2024-06-04

## 2023-12-07 ASSESSMENT — PAIN SCALES - GENERAL: PAINLEVEL: NO PAIN (0)

## 2023-12-07 NOTE — PROGRESS NOTES
Assessment and Plan  1. Dyslipidemia  Chronic problem, started on simvastatin given the risk factors below.  Will recheck lipid panel and do further recommendations.  Patient denies any side effects of the statins.  - Lipid panel reflex to direct LDL Fasting; Future  - simvastatin (ZOCOR) 10 MG tablet; Take 1 tablet (10 mg) by mouth at bedtime  Dispense: 90 tablet; Refill: 1  - Lipid panel reflex to direct LDL Fasting    2. Essential hypertension  Chronic problem, well-controlled.  Continue current lisinopril 10 mg daily.  Will check renal function to make sure this is remaining normal.  Patient understood and agreed to plan.  - Comprehensive metabolic panel (BMP + Alb, Alk Phos, ALT, AST, Total. Bili, TP); Future  - lisinopril (ZESTRIL) 10 MG tablet; Take 1 tablet (10 mg) by mouth daily  Dispense: 90 tablet; Refill: 1  - Comprehensive metabolic panel (BMP + Alb, Alk Phos, ALT, AST, Total. Bili, TP)    3. Skin lesion  New problem of pigmented skin lesion on the left side of the bridge of the nose which patient endorses has been there for more than a year beneath her eyeglasses which started as a skin tag but later became a skin lesion and is pigmented.  Requesting for removal, shared decision for dermatology referral which patient understood and agreed with the plan.  - Adult Dermatology  Referral; Future         Please note that this note consists of symbols derived from keyboarding, dictation and/or voice recognition software. As a result, there may be errors in the script that have gone undetected. Please consider this when interpreting information found in this chart.    Patient Instructions   As discussed, given your well controlled BP on current medication will continue same.   No changes at this time.     Placed referral to dermatology for removal of skin lesion    Return in about 5 months (around 5/7/2024), or if symptoms worsen or fail to improve, for Preventative Visit.    MD AKIRA Domínguez  [facility] SAT[place] Aceves is a 63 year old, presenting for the following health issues:  Hypertension (Bp recheck)        12/7/2023     2:23 PM   Additional Questions   Roomed by sally       History of Present Illness       Hypertension: She presents for follow up of hypertension.  She does check blood pressure  regularly outside of the clinic. Outpatient blood pressures have not been over 140/90. She does not follow a low salt diet.     She eats 0-1 servings of fruits and vegetables daily.She consumes 0 sweetened beverage(s) daily.She exercises with enough effort to increase her heart rate 20 to 29 minutes per day.  She exercises with enough effort to increase her heart rate 5 days per week.   She is taking medications regularly.       Last seen pt on 5/2023 for Annual physical . With new diagnosis of HTN on Lisinopril and here for recheck. Need Renal function check. Does have dyslipidemia on Simvastatin. Will need recheck of labs.      No Known Allergies     Past Medical History:   Diagnosis Date    Allergic rhinitis 2013    Hearing problem early 1990s    Hearing loss for high-pitched tones    Reduced vision 2017    visual migraines    Seasonal allergies     Tinnitus as long as I can remember    constant, but ignorable most of the time       Past Surgical History:   Procedure Laterality Date    BIOPSY  October 2022 & Dec 2022    Dec 22: skin from right temple following Moh's procedure; Oct 22: mole on forehead    bone marrow donation  1988    COLONOSCOPY  Spring 2017??    CYSTECTOMY OVARIAN BENIGN      left    ENDOCERVICAL CURETTAGE      MAMMOPLASTY REDUCTION BILATERAL Bilateral 03/22/2018    Procedure: MAMMOPLASTY REDUCTION BILATERAL;  Bilateral Breast Reduction;  Surgeon: AKIRA Vizcarra MD;  Location:  OR       Family History   Problem Relation Age of Onset    Diabetes Mother     Hypertension Mother     Rheumatologic Disease Mother     Obesity Mother     Cancer  Father         Prostate cancer    Prostate Cancer Father     Cancer Maternal Grandmother         liver or pancreatic (not sure which)    Liver Disease Maternal Grandmother     Other Cancer Maternal Grandmother         liver cancer    Cerebrovascular Disease Maternal Grandfather     Cancer Paternal Grandmother     Cancer Paternal Grandfather         Prostate cancer    No Known Problems Sister     No Known Problems Sister     Anxiety Disorder Sister     Diabetes Sister     Obesity Sister     Obesity Sister     Melanoma No family hx of     Skin Cancer No family hx of        Social History     Tobacco Use    Smoking status: Never    Smokeless tobacco: Never   Substance Use Topics    Alcohol use: Yes     Alcohol/week: 3.0 standard drinks of alcohol     Comment: 6-7 drinks per week        Current Outpatient Medications   Medication    escitalopram (LEXAPRO) 20 MG tablet    fexofenadine-pseudoePHEDrine (ALLEGRA-D 24) 180-240 MG per 24 hr tablet    Glucosamine-Chondroitin (GLUCOSAMINE CHONDR COMPLEX PO)    hydrOXYzine HCl (ATARAX) 10 MG tablet    lisinopril (ZESTRIL) 10 MG tablet    Multiple Vitamin (MULTIVITAMIN) per tablet    propranolol (INDERAL) 10 MG tablet    simvastatin (ZOCOR) 10 MG tablet     No current facility-administered medications for this visit.        Review of Systems   Constitutional, HEENT, cardiovascular, pulmonary, GI, , musculoskeletal, neuro, skin, endocrine and psych systems are negative, except as otherwise noted.      Objective    /86   Pulse 61   Temp 97.6  F (36.4  C) (Tympanic)   Resp 20   Wt 104.7 kg (230 lb 12.8 oz)   LMP 02/01/2015   SpO2 97%   BMI 38.41 kg/m    Body mass index is 38.41 kg/m .  Physical Exam   GENERAL: healthy, alert and no distress  NECK: no adenopathy, no asymmetry, masses, or scars and thyroid normal to palpation  RESP: lungs clear to auscultation - no rales, rhonchi or wheezes  CV: regular rate and rhythm, normal S1 S2, no S3 or S4, no murmur, click or  rub, no peripheral edema and peripheral pulses strong  ABDOMEN: soft, nontender, no hepatosplenomegaly, no masses and bowel sounds normal  MS: no gross musculoskeletal defects noted, no edema  SKIN : POSITIVE  pigmented skin lesion on the left side of the bridge of the nose measuring 0.5 cm in length

## 2023-12-07 NOTE — PATIENT INSTRUCTIONS
As discussed, given your well controlled BP on current medication will continue same.   No changes at this time.     Placed referral to dermatology for removal of skin lesion

## 2024-02-01 ENCOUNTER — OFFICE VISIT (OUTPATIENT)
Dept: FAMILY MEDICINE | Facility: CLINIC | Age: 64
End: 2024-02-01
Attending: INTERNAL MEDICINE
Payer: COMMERCIAL

## 2024-02-01 DIAGNOSIS — D49.2 NEOPLASM OF UNSPECIFIED BEHAVIOR OF BONE, SOFT TISSUE, AND SKIN: ICD-10-CM

## 2024-02-01 DIAGNOSIS — D18.01 CHERRY ANGIOMA: ICD-10-CM

## 2024-02-01 DIAGNOSIS — L82.1 SEBORRHEIC KERATOSES: ICD-10-CM

## 2024-02-01 DIAGNOSIS — Z85.820 HISTORY OF MALIGNANT MELANOMA OF SKIN: ICD-10-CM

## 2024-02-01 DIAGNOSIS — Z12.83 ENCOUNTER FOR SCREENING FOR MALIGNANT NEOPLASM OF SKIN: Primary | ICD-10-CM

## 2024-02-01 DIAGNOSIS — L91.8 INFLAMED ACROCHORDON: ICD-10-CM

## 2024-02-01 DIAGNOSIS — L81.4 LENTIGINES: ICD-10-CM

## 2024-02-01 DIAGNOSIS — D22.9 MULTIPLE NEVI: ICD-10-CM

## 2024-02-01 DIAGNOSIS — L82.0 INFLAMED SEBORRHEIC KERATOSIS: ICD-10-CM

## 2024-02-01 PROCEDURE — 17110 DESTRUCTION B9 LES UP TO 14: CPT | Performed by: PHYSICIAN ASSISTANT

## 2024-02-01 PROCEDURE — 88305 TISSUE EXAM BY PATHOLOGIST: CPT | Performed by: DERMATOLOGY

## 2024-02-01 PROCEDURE — 11103 TANGNTL BX SKIN EA SEP/ADDL: CPT | Mod: XS | Performed by: PHYSICIAN ASSISTANT

## 2024-02-01 PROCEDURE — 11102 TANGNTL BX SKIN SINGLE LES: CPT | Mod: XS | Performed by: PHYSICIAN ASSISTANT

## 2024-02-01 PROCEDURE — 88360 TUMOR IMMUNOHISTOCHEM/MANUAL: CPT | Performed by: DERMATOLOGY

## 2024-02-01 PROCEDURE — 99213 OFFICE O/P EST LOW 20 MIN: CPT | Mod: 25 | Performed by: PHYSICIAN ASSISTANT

## 2024-02-01 PROCEDURE — 88342 IMHCHEM/IMCYTCHM 1ST ANTB: CPT | Mod: XU | Performed by: DERMATOLOGY

## 2024-02-01 ASSESSMENT — PAIN SCALES - GENERAL: PAINLEVEL: NO PAIN (0)

## 2024-02-01 NOTE — PROGRESS NOTES
[facility] Dermatology Note  Encounter Date: Feb 1, 2024  Office Visit     Reviewed patients past medical history and pertinent chart review prior to patients visit today.     Dermatology Problem List:  # Melanoma in situ, right temple x1, Mohs 10/27/2022  # ISK - cryo  # Inflamed acrochordon - cryo      NUB, left nasal sidewall, shave biopsy 2/1/2024   NUB, right upper back, shave biopsy 2/1/2024     ____________________________________________    Assessment & Plan:     # Neoplasm of uncertain behavior:  left nasal sidewall x1  DDx includes nevus vs acrochordon vs melanoma. Shave biopsy today.  # Neoplasm of uncertain behavior:  right upper back x1  DDx includes nevus vs SK vs melanoma. Shave biopsy today.    Procedure Note: Biopsy by shave technique  The risks and benefits of the procedure were described to the patient. These include but are not limited to bleeding, infection, scar, incomplete removal, and non-diagnostic biopsy. Verbal informed consent was obtained. The above site(s) was cleansed with an alcohol pad and injected with 1% lidocaine with epinephrine. Once anesthesia was obtained, a biopsy(ies) was performed with Gilette blade. The tissue(s) was placed in a labeled container(s) with formalin and sent to pathology. Hemostasis was achieved with aluminum chloride. Vaseline and a bandage were applied to the wound(s). The patient tolerated the procedure well and was given post biopsy care instructions.    # Inflamed seborrheic keratoses x4  The benign nature of the skin lesion(s) was discussed with the patient.  Due to the inflamed and irritated nature of the lesions I recommend cryotherapy and the patient was agreeable.      Cryotherapy procedure note, location(s): left breast x4 After verbal consent and discussion of risks and benefits including, but not limited to, dyspigmentation/scar, blister, and pain, the lesion(s) was(were) treated with 1-2 mm freeze border for 1-2 cycles with  liquid nitrogen. Post cryotherapy instructions were provided.    # Inflamed acrochordons x6  The benign nature of the skin lesion(s) was discussed with the patient.  Due to the inflamed and irritated nature of the lesions I recommend cryotherapy and the patient was agreeable.  Care reviewed.    Cryotherapy procedure note, location(s):  left neck x4 and right neck x2 After verbal consent and discussion of risks and benefits including, but not limited to, dyspigmentation/scar, blister, and pain, the lesion(s) was(were) treated with 1-2 mm freeze border for 1-2 cycles with liquid nitrogen. Post cryotherapy instructions were provided.     # Personal history of malignant melanoma  # Multiple nevi, trunk and extremities  # Solar lentigines  - No signs of recurrence. Continued observation recommended.   - Nevi demonstrate no concerning features on dermoscopy. We discussed the importance of self exams at home.   - ABCDEs: Counseled ABCDEs of melanoma: Asymmetry, Border (irregularity), Color (not uniform, changes in color), Diameter (greater than 6 mm which is about the size of a pencil eraser), and Evolving (any changes in preexisting moles).  - Sun protection: Counseled SPF 30+ sunscreen, UPF clothing, sun avoidance, tanning bed avoidance.    # Cherry angiomas  # Seborrheic keratoses  - We discussed the benign nature of the skin lesions. No treatment required. Continued observation recommended. Follow up with any concerns.      Follow-up:  Annual for follow up full body skin exam, as needed for new or changing lesions or new concerns    All risks, benefits and alternatives were discussed with patient.  Patient is in agreement and understands the assessment and plan.  All questions were answered.  Amada Wood PA-C  [facility] Dermatology    ____________________________________________    CC: Skin Check ([facility] [place], concerned about spot on L side of nose)    HPI:  Ms. Yola Amin is a(n) 63 year old female who  presents today as a return patient for a full body skin cancer screening. The patient has a history of malignant melanoma. Today, the patient reports a dark spot on the left side of her nose that has been present for the last year and a half. No pain, itching, or bleeding at the site. Additionally, she notes seborrheic keratoses and skin tags that get irritated against clothing. She is interested in removal. No other specific cutaneous concerns. The patient reports trying to be diligent with photoprotection.      Physical Exam:  Vitals: LMP 02/01/2015   SKIN: Total skin excluding the genitalia areas was performed. The exam included the scalp, face, neck, bilateral arms, chest, back, abdomen, bilateral legs, digits, mons pubis, buttocks, and nails.   - Hernandez II.  - The left nasal sidewall demonstrates a 0.2 x 0.4 cm tan macule with central pink papule.   - The right upper back demonstrates a 0.4 x 0.4 cm dark brown shiny papule.  - The left breast x4 demonstrates waxy papule(s) with an erythematous base, consistent with inflamed seborrheic keratoses.   - The left neck x4 and right neck x2 demonstrates fleshy, pedunculated papules with an erythematous base, consistent with inflamed acrochordons.    - The right temple demonstrates a well healed scar with no nodularity, repigmentation, or pain to palpation.   - Multiple tan/brown macules and papules scattered throughout exam, consistent with benign nevi. No concerning features on dermoscopy.   - Scattered tan, homogenous macules scattered on sun exposed skin, consistent with solar lentigines.   - Scattered waxy, stuck on appearing papules and patches, consistent with seborrheic keratoses.  - Several 1-2 mm red dome shaped symmetric papules, consistent with cherry angiomas.     - No other lesions of concern on areas examined.     Medications:  Current Outpatient Medications   Medication    escitalopram (LEXAPRO) 20 MG tablet    fexofenadine-pseudoePHEDrine  (ALLEGRA-D 24) 180-240 MG per 24 hr tablet    Glucosamine-Chondroitin (GLUCOSAMINE CHONDR COMPLEX PO)    lisinopril (ZESTRIL) 10 MG tablet    Multiple Vitamin (MULTIVITAMIN) per tablet    propranolol (INDERAL) 10 MG tablet    simvastatin (ZOCOR) 10 MG tablet    hydrOXYzine HCl (ATARAX) 10 MG tablet     No current facility-administered medications for this visit.      Past Medical History:   Patient Active Problem List   Diagnosis    Allergic rhinitis    S/P bilateral breast reduction    S/P Mohs surgery for basal cell carcinoma    Normal hysteroscopy     Past Medical History:   Diagnosis Date    Allergic rhinitis 2013    Hearing problem early 1990s    Hearing loss for high-pitched tones    Reduced vision 2017    visual migraines    Seasonal allergies     Tinnitus as long as I can remember    constant, but ignorable most of the time       CC Queenie Carcamo MD  [address] [place] [address] on close of this encounter.

## 2024-02-01 NOTE — LETTER
2/1/2024         RE: Yola Amin  [address] [id number]        Dear Colleague,    Thank you for referring your patient, Yola Amin, to the [facility]. Please see a copy of my visit note below.    [facility] Dermatology Note  Encounter Date: Feb 1, 2024  Office Visit     Reviewed patients past medical history and pertinent chart review prior to patients visit today.     Dermatology Problem List:  # Melanoma in situ, right temple x1, Mohs 10/27/2022  # ISK - cryo  # Inflamed acrochordon - cryo      NUB, left nasal sidewall, shave biopsy 2/1/2024   NUB, right upper back, shave biopsy 2/1/2024     ____________________________________________    Assessment & Plan:     # Neoplasm of uncertain behavior:  left nasal sidewall x1  DDx includes nevus vs acrochordon vs melanoma. Shave biopsy today.  # Neoplasm of uncertain behavior:  right upper back x1  DDx includes nevus vs SK vs melanoma. Shave biopsy today.    Procedure Note: Biopsy by shave technique  The risks and benefits of the procedure were described to the patient. These include but are not limited to bleeding, infection, scar, incomplete removal, and non-diagnostic biopsy. Verbal informed consent was obtained. The above site(s) was cleansed with an alcohol pad and injected with 1% lidocaine with epinephrine. Once anesthesia was obtained, a biopsy(ies) was performed with Gilette blade. The tissue(s) was placed in a labeled container(s) with formalin and sent to pathology. Hemostasis was achieved with aluminum chloride. Vaseline and a bandage were applied to the wound(s). The patient tolerated the procedure well and was given post biopsy care instructions.    # Inflamed seborrheic keratoses x4  The benign nature of the skin lesion(s) was discussed with the patient.  Due to the inflamed and irritated nature of the lesions I recommend cryotherapy and the patient was agreeable.       Cryotherapy procedure note, location(s): left breast x4 After verbal consent and discussion of risks and benefits including, but not limited to, dyspigmentation/scar, blister, and pain, the lesion(s) was(were) treated with 1-2 mm freeze border for 1-2 cycles with liquid nitrogen. Post cryotherapy instructions were provided.    # Inflamed acrochordons x6  The benign nature of the skin lesion(s) was discussed with the patient.  Due to the inflamed and irritated nature of the lesions I recommend cryotherapy and the patient was agreeable.  Care reviewed.    Cryotherapy procedure note, location(s):  left neck x4 and right neck x2 After verbal consent and discussion of risks and benefits including, but not limited to, dyspigmentation/scar, blister, and pain, the lesion(s) was(were) treated with 1-2 mm freeze border for 1-2 cycles with liquid nitrogen. Post cryotherapy instructions were provided.     # Personal history of malignant melanoma  # Multiple nevi, trunk and extremities  # Solar lentigines  - No signs of recurrence. Continued observation recommended.   - Nevi demonstrate no concerning features on dermoscopy. We discussed the importance of self exams at home.   - ABCDEs: Counseled ABCDEs of melanoma: Asymmetry, Border (irregularity), Color (not uniform, changes in color), Diameter (greater than 6 mm which is about the size of a pencil eraser), and Evolving (any changes in preexisting moles).  - Sun protection: Counseled SPF 30+ sunscreen, UPF clothing, sun avoidance, tanning bed avoidance.    # Cherry angiomas  # Seborrheic keratoses  - We discussed the benign nature of the skin lesions. No treatment required. Continued observation recommended. Follow up with any concerns.      Follow-up:  Annual for follow up full body skin exam, as needed for new or changing lesions or new concerns    All risks, benefits and alternatives were discussed with patient.  Patient is in agreement and understands the assessment and  plan.  All questions were answered.  Amada Wood PA-C  [facility]ermat[facility]    ____________________________________________    CC: Skin Check ([facility] [place], concerned about spot on L side of nose)    HPI:  Ms. Yola Amin is a(n) 63 year old female who presents today as a return patient for a full body skin cancer screening. The patient has a history of malignant melanoma. Today, the patient reports a dark spot on the left side of her nose that has been present for the last year and a half. No pain, itching, or bleeding at the site. Additionally, she notes seborrheic keratoses and skin tags that get irritated against clothing. She is interested in removal. No other specific cutaneous concerns. The patient reports trying to be diligent with photoprotection.      Physical Exam:  Vitals: LMP 02/01/2015   SKIN: Total skin excluding the genitalia areas was performed. The exam included the scalp, face, neck, bilateral arms, chest, back, abdomen, bilateral legs, digits, mons pubis, buttocks, and nails.   - Hernandez II.  - The left nasal sidewall demonstrates a 0.2 x 0.4 cm tan macule with central pink papule.   - The right upper back demonstrates a 0.4 x 0.4 cm dark brown shiny papule.  - The left breast x4 demonstrates waxy papule(s) with an erythematous base, consistent with inflamed seborrheic keratoses.   - The left neck x4 and right neck x2 demonstrates fleshy, pedunculated papules with an erythematous base, consistent with inflamed acrochordons.    - The right temple demonstrates a well healed scar with no nodularity, repigmentation, or pain to palpation.   - Multiple tan/brown macules and papules scattered throughout exam, consistent with benign nevi. No concerning features on dermoscopy.   - Scattered tan, homogenous macules scattered on sun exposed skin, consistent with solar lentigines.   - Scattered waxy, stuck on appearing papules and patches, consistent with seborrheic keratoses.  - Several 1-2 mm  red dome shaped symmetric papules, consistent with cherry angiomas.     - No other lesions of concern on areas examined.     Medications:  Current Outpatient Medications   Medication     escitalopram (LEXAPRO) 20 MG tablet     fexofenadine-pseudoePHEDrine (ALLEGRA-D 24) 180-240 MG per 24 hr tablet     Glucosamine-Chondroitin (GLUCOSAMINE CHONDR COMPLEX PO)     lisinopril (ZESTRIL) 10 MG tablet     Multiple Vitamin (MULTIVITAMIN) per tablet     propranolol (INDERAL) 10 MG tablet     simvastatin (ZOCOR) 10 MG tablet     hydrOXYzine HCl (ATARAX) 10 MG tablet     No current facility-administered medications for this visit.      Past Medical History:   Patient Active Problem List   Diagnosis     Allergic rhinitis     S/P bilateral breast reduction     S/P Mohs surgery for basal cell carcinoma     Normal hysteroscopy     Past Medical History:   Diagnosis Date     Allergic rhinitis 2013     Hearing problem early 1990s    Hearing loss for high-pitched tones     Reduced vision 2017    visual migraines     Seasonal allergies      Tinnitus as long as I can remember    constant, but ignorable most of the time       CC Queenie Carcamo MD  [address] [place]  [place] [address] on close of this encounter.      Again, thank you for allowing me to participate in the care of your patient.        Sincerely,        Amada Wood PA-C

## 2024-02-01 NOTE — PATIENT INSTRUCTIONS
Patient Education       Proper skin care from [facility]:    -Eliminate harsh soaps as they strip the natural oils from the skin, often resulting in dry itchy skin ( i.e. Dial, Zest, Divehi Spring)  -Use mild soaps such as Cetaphil or Dove Sensitive Skin in the shower. You do not need to use soap on arms, legs, and trunk every time you shower unless visibly soiled.   -Avoid hot or cold showers.  -After showering, lightly dry off and apply moisturizing within 2-3 minutes. This will help trap moisture in the skin.   -Aggressive use of a moisturizer at least 1-2 times a day to the entire body (including -Vanicream, Cetaphil, Aquaphor or Cerave) and moisturize hands after every washing.  -We recommend using moisturizers that come in a tub that needs to be scooped out, not a pump. This has more of an oil base. It will hold moisture in your skin much better than a water base moisturizer. The above recommended are non-pore clogging.      Wear a sunscreen with at least SPF 30 on your face, ears, neck and V of the chest daily. Wear sunscreen on other areas of the body if those areas are exposed to the sun throughout the day. Sunscreens can contain physical and/or chemical blockers. Physical blockers are less likely to clog pores, these include zinc oxide and titanium dioxide. Reapply every two hour and after swimming.     Sunscreen examples: https://www.ewg.org/sunscreen/    UV radiation  UVA radiation remains constant throughout the day and throughout the year. It is a longer wavelength than UVB and therefore penetrates deeper into the skin leading to immediate and delayed tanning, photoaging, and skin cancer. 70-80% of UVA and UVB radiation occurs between the hours of 10am-2pm.  UVB radiation  UVB radiation causes the most harmful effects and is more significant during the summer months. However, snow and ice can reflect UVB radiation leading to skin damage during the winter months as well. UVB radiation is  responsible for tanning, burning, inflammation, delayed erythema (pinkness), pigmentation (brown spots), and skin cancer.     I recommend self monthly full body exams and yearly full body exams with a dermatology provider. If you develop a new or changing lesion please follow up for examination. Most skin cancers are pink and scaly or pink and pearly. However, we do see blue/brown/black skin cancers.  Consider the ABCDEs of melanoma when giving yourself your monthly full body exam ( don't forget the groin, buttocks, feet, toes, etc). A-asymmetry, B-borders, C-color, D-diameter, E-elevation or evolving. If you see any of these changes please follow up in clinic. If you cannot see your back I recommend purchasing a hand held mirror to use with a larger wall mirror.       Checking for Skin Cancer  You can find cancer early by checking your skin each month. There are 3 kinds of skin cancer. They are melanoma, basal cell carcinoma, and squamous cell carcinoma. Doing monthly skin checks is the best way to find new marks or skin changes. Follow the instructions below for checking your skin.   The ABCDEs of checking moles for melanoma   Check your moles or growths for signs of melanoma using ABCDE:   Asymmetry: the sides of the mole or growth don t match  Border: the edges are ragged, notched, or blurred  Color: the color within the mole or growth varies  Diameter: the mole or growth is larger than 6 mm (size of a pencil eraser)  Evolving: the size, shape, or color of the mole or growth is changing (evolving is not shown in the images below)    Checking for other types of skin cancer  Basal cell carcinoma or squamous cell carcinoma have symptoms such as:     A spot or mole that looks different from all other marks on your skin  Changes in how an area feels, such as itching, tenderness, or pain  Changes in the skin's surface, such as oozing, bleeding, or scaliness  A sore that does not heal  New swelling or redness beyond  the border of a mole    Who s at risk?  Anyone can get skin cancer. But you are at greater risk if you have:   Fair skin, light-colored hair, or light-colored eyes  Many moles or abnormal moles on your skin  A history of sunburns from sunlight or tanning beds  A family history of skin cancer  A history of exposure to radiation or chemicals  A weakened immune system  If you have had skin cancer in the past, you are at risk for recurring skin cancer.   How to check your skin  Do your monthly skin checkups in front of a full-length mirror. Check all parts of your body, including your:   Head (ears, face, neck, and scalp)  Torso (front, back, and sides)  Arms (tops, undersides, upper, and lower armpits)  Hands (palms, backs, and fingers, including under the nails)  Buttocks and genitals  Legs (front, back, and sides)  Feet (tops, soles, toes, including under the nails, and between toes)  If you have a lot of moles, take digital photos of them each month. Make sure to take photos both up close and from a distance. These can help you see if any moles change over time.   Most skin changes are not cancer. But if you see any changes in your skin, call your doctor right away. Only he or she can diagnose a problem. If you have skin cancer, seeing your doctor can be the first step toward getting the treatment that could save your life.   [employer] last reviewed this educational content on 4/1/2019 2000-2020 [employer] [address] [place] [address], [place] [address]. All rights reserved. This information is not intended as a substitute for professional medical care. Always follow your healthcare professional's instructions.       When should I call my doctor?  If you are worsening or not improving, please, contact us or seek urgent care as noted below.     Who should I call with questions (adults)?  [facility] (adult and pediatric): [phone]  [facility]  [place] (adult): [phone]  [facility] ([place], [place], [place] and [place]) [phone]  For urgent needs outside of business hours call the [facility] at [phone] and ask for the dermatology resident on call to be paged  If this is a medical emergency and you are unable to reach an ER, Call 911      If you need a prescription refill, please contact your pharmacy. Refills are approved or denied by our Physicians during normal business hours, Monday through Fridays  Per office policy, refills will not be granted if you have not been seen within the past year (or sooner depending on your child's condition)

## 2024-02-05 ENCOUNTER — TELEPHONE (OUTPATIENT)
Dept: DERMATOLOGY | Facility: CLINIC | Age: 64
End: 2024-02-05
Payer: COMMERCIAL

## 2024-02-05 DIAGNOSIS — C43.59 MELANOMA OF BACK (H): Primary | ICD-10-CM

## 2024-02-05 LAB
PATH REPORT.COMMENTS IMP SPEC: ABNORMAL
PATH REPORT.COMMENTS IMP SPEC: YES
PATH REPORT.FINAL DX SPEC: ABNORMAL
PATH REPORT.GROSS SPEC: ABNORMAL
PATH REPORT.MICROSCOPIC SPEC OTHER STN: ABNORMAL
PATH REPORT.RELEVANT HX SPEC: ABNORMAL

## 2024-02-06 ENCOUNTER — TELEPHONE (OUTPATIENT)
Dept: FAMILY MEDICINE | Facility: CLINIC | Age: 64
End: 2024-02-06
Payer: COMMERCIAL

## 2024-02-06 DIAGNOSIS — C43.59 MELANOMA OF BACK (H): Primary | ICD-10-CM

## 2024-02-06 NOTE — TELEPHONE ENCOUNTER
Pt called back and went over Amada's message below. Explained and answered all questions about procedure.  Scheduled with Dr. Calixto on Monday 2/12/24 at 7:30 am at [facility].    Mohs letter and information sent via my chart and mailed home.    Mary RIOS RN  [facility]  [phone]

## 2024-02-06 NOTE — TELEPHONE ENCOUNTER
I called the patient to discuss results of biopsies obtained 2/1/2024.  The biopsy of the left nasal sidewall demonstrated a benign inflamed macular seborrheic keratosis.  No further treatment is indicated.  The biopsy of the right upper back demonstrated a malignant melanoma.  We reviewed that this is a type of skin cancer that requires further treatment.  I recommended follow-up for a wide local excision with our dermatology surgery team.  We will plan to follow-up in 6 months for a full skin cancer screening evaluation, sooner should the patient develop any concerning lesions.  The patient was agreeable to this plan and had no further questions.

## 2024-02-06 NOTE — TELEPHONE ENCOUNTER
Patient Contact    Attempt # 1    Was call answered?  No.  Left message on voicemail with information to call nurse back at [phone].     Mary RIOS RN  [facility]  [phone]

## 2024-02-06 NOTE — TELEPHONE ENCOUNTER
----- Message from Alina Perez CMA sent at 2/6/2024  8:49 AM CST -----    ----- Message -----  From: Amada Wood PA-C  Sent: 2/5/2024   4:36 PM CST  To: [facility]    A. Benign  B. Melanoma, needs excision, order placed. Please call to schedule. Thank you.      Right upper back:  - Malignant melanoma     During your evaluation at [facility] two skin biopsies were obtained.     This specimen involving the right upper back was examined under the microscope and demonstrated malignant melanoma. As we discussed over the phone, malignant melanoma is a type of skin cancer. This requires further treatment with excision surgery to ensure complete removal. You can expect to receive a call from our team to schedule.     The specimen involving the left nasal sidewall demonstrated a benign inflamed macule seborrheic keratosis. No further treatment is required.     I recommend continued sun avoidence, diligent use of sunscreens with an SPF of 30 or more, and skin examinations every 6 months. You should have your skin examined sooner if similar lesions occur or if lesions develop that do not heal.     It was a pleasure to participate in your dermatologic care. Please do not hesitate to contact me if you have any questions     Sincerely,  Amada Wood PA-C

## 2024-02-06 NOTE — LETTER
[facility]  [address] [place] [address]  [phone]      February 6, 2024    Yola Amin  [address] [address]      Dear Yola      You are scheduled for Mohs Surgery on Monday February 12th at 7:30 am.     Please check in at 2nd floor Dermatology Clinic.     Be sure to eat a good breakfast and bathe and wash your hair prior to Surgery. Please bring  with you if this is above your neck    If you are taking any anti-coagulants that are prescribed by your Doctor (such as Coumadin/warfarin, Plavix, Aspirin, Ibuprofen), please continue taking them.     However, If you are taking anti-coagulants over the counter without  a Doctor's order for a Medical condition, please discontinue them 10 days prior to Surgery.      Please wear loose comfortable clothing as it could possibly be 4-6 hours until your surgery is completed depending upon how many layers of tissue need to be removed.     Thank you,    Ra Calixto MD

## 2024-02-12 ENCOUNTER — OFFICE VISIT (OUTPATIENT)
Dept: DERMATOLOGY | Facility: CLINIC | Age: 64
End: 2024-02-12
Payer: COMMERCIAL

## 2024-02-12 DIAGNOSIS — D23.9 DERMAL NEVUS: Primary | ICD-10-CM

## 2024-02-12 DIAGNOSIS — L82.1 SEBORRHEIC KERATOSES: ICD-10-CM

## 2024-02-12 DIAGNOSIS — C43.59 MELANOMA OF BACK (H): ICD-10-CM

## 2024-02-12 DIAGNOSIS — L81.4 LENTIGO: ICD-10-CM

## 2024-02-12 DIAGNOSIS — D18.01 ANGIOMA OF SKIN: ICD-10-CM

## 2024-02-12 DIAGNOSIS — D22.9 MULTIPLE BENIGN NEVI: ICD-10-CM

## 2024-02-12 PROCEDURE — 13101 CMPLX RPR TRUNK 2.6-7.5 CM: CPT | Performed by: DERMATOLOGY

## 2024-02-12 PROCEDURE — 17313 MOHS 1 STAGE T/A/L: CPT | Performed by: DERMATOLOGY

## 2024-02-12 PROCEDURE — 17314 MOHS ADDL STAGE T/A/L: CPT | Performed by: DERMATOLOGY

## 2024-02-12 PROCEDURE — 99213 OFFICE O/P EST LOW 20 MIN: CPT | Mod: 25 | Performed by: DERMATOLOGY

## 2024-02-12 PROCEDURE — 88342 IMHCHEM/IMCYTCHM 1ST ANTB: CPT | Mod: 59 | Performed by: DERMATOLOGY

## 2024-02-12 ASSESSMENT — PAIN SCALES - GENERAL: PAINLEVEL: NO PAIN (0)

## 2024-02-12 NOTE — PROGRESS NOTES
Yola Amin , a 63 year old year old female patient, I was asked to see by MEHRDAD Wood for evaluation and managment of melanoma 0.5mm on right upper back.  Patient has no other skin complaints today.  Remainder of the HPI, Meds, PMH, Allergies, FH, and SH was reviewed in chart.    Pertinent Hx:   Hx of melanoma   Past Medical History:   Diagnosis Date    Allergic rhinitis 2013    Hearing problem early 1990s    Hearing loss for high-pitched tones    Malignant melanoma (H)     Reduced vision 2017    visual migraines    Seasonal allergies     Tinnitus as long as I can remember    constant, but ignorable most of the time       Past Surgical History:   Procedure Laterality Date    BIOPSY  October 2022 & Dec 2022    Dec 22: skin from right temple following Moh's procedure; Oct 22: mole on forehead    bone marrow donation  1988    COLONOSCOPY  Spring 2017??    CYSTECTOMY OVARIAN BENIGN      left    ENDOCERVICAL CURETTAGE      MAMMOPLASTY REDUCTION BILATERAL Bilateral 03/22/2018    Procedure: MAMMOPLASTY REDUCTION BILATERAL;  Bilateral Breast Reduction;  Surgeon: AKIRA Vizcarra MD;  Location: [facility] OR        Family History   Problem Relation Age of Onset    Diabetes Mother     Hypertension Mother     Rheumatologic Disease Mother     Obesity Mother     Cancer Father         Prostate cancer    Prostate Cancer Father     Cancer Maternal Grandmother         liver or pancreatic (not sure which)    Liver Disease Maternal Grandmother     Other Cancer Maternal Grandmother         liver cancer    Cerebrovascular Disease Maternal Grandfather     Cancer Paternal Grandmother     Cancer Paternal Grandfather         Prostate cancer    No Known Problems Sister     No Known Problems Sister     Anxiety Disorder Sister     Diabetes Sister     Obesity Sister     Obesity Sister     Melanoma No family hx of     Skin Cancer No family hx of        Social History     Socioeconomic History    Marital status:      Spouse name: Not  on file    Number of children: Not on file    Years of education: Not on file    Highest education level: Not on file   Occupational History    Occupation: Industrial organizational psychologist   Tobacco Use    Smoking status: Never    Smokeless tobacco: Never   Substance and Sexual Activity    Alcohol use: Yes     Alcohol/week: 3.0 standard drinks of alcohol     Comment: 6-7 drinks per week    Drug use: No    Sexual activity: Not Currently     Partners: Female     Birth control/protection: None   Other Topics Concern    Parent/sibling w/ CABG, MI or angioplasty before 65F 55M? No   Social History Narrative    Work is going well.  Lives with wife.  Two cats.        Has a good support system.    Feels safe in all environments.    Wears seatbelt 100% of the time    Wears helmet while biking.    Denies history of abuse, past or present, physical, sexual or emotional.    12/21/17    Nancy Kasper PA-C         Social Determinants of Health     Financial Resource Strain: Low Risk  (11/30/2023)    Financial Resource Strain     Within the past 12 months, have you or your family members you live with been unable to get utilities (heat, electricity) when it was really needed?: No   Food Insecurity: Low Risk  (11/30/2023)    Food Insecurity     Within the past 12 months, did you worry that your food would run out before you got money to buy more?: No     Within the past 12 months, did the food you bought just not last and you didn t have money to get more?: No   Transportation Needs: Low Risk  (11/30/2023)    Transportation Needs     Within the past 12 months, has lack of transportation kept you from medical appointments, getting your medicines, non-medical meetings or appointments, work, or from getting things that you need?: No   Physical Activity: Not on file   Stress: Not on file   Social Connections: Not on file   Interpersonal Safety: Not on file   Housing Stability: Low Risk  (11/30/2023)    Housing Stability     Do you  have housing? : Yes     Are you worried about losing your housing?: No       Outpatient Encounter Medications as of 2/12/2024   Medication Sig Dispense Refill    escitalopram (LEXAPRO) 20 MG tablet       fexofenadine-pseudoePHEDrine (ALLEGRA-D 24) 180-240 MG per 24 hr tablet Take 1 tablet by mouth daily 14 tablet 1    Glucosamine-Chondroitin (GLUCOSAMINE CHONDR COMPLEX PO) Take 1 capsule by mouth daily       lisinopril (ZESTRIL) 10 MG tablet Take 1 tablet (10 mg) by mouth daily 90 tablet 1    Multiple Vitamin (MULTIVITAMIN) per tablet Take 1 tablet by mouth daily.      propranolol (INDERAL) 10 MG tablet TAKE 1 TO 2 TABLETS BY MOUTH TWICE DAILY AS NEEDED FOR ANXIETY      simvastatin (ZOCOR) 10 MG tablet Take 1 tablet (10 mg) by mouth at bedtime 90 tablet 1    hydrOXYzine HCl (ATARAX) 10 MG tablet TAKE 1 TO 2 TABLETS BY MOUTH UP TO THREE TIMES DAILY AS NEEDED FOR ANXIETY       No facility-administered encounter medications on file as of 2/12/2024.             Review Of Systems  Skin: As above  Eyes: negative  Ears/Nose/Throat: negative  Respiratory: No shortness of breath, dyspnea on exertion, cough, or hemoptysis  Cardiovascular: negative  Gastrointestinal: negative  Genitourinary: negative  Musculoskeletal: negative  Neurologic: negative  Psychiatric: negative  Hematologic/Lymphatic/Immunologic: negative  Endocrine: negative      O:   NAD, WDWN, Alert & Oriented, Mood & Affect wnl, Vitals stable   General appearance jesse ii   Vitals stable   Alert, oriented and in no acute distress      Following lymph nodes palpated: axillar no lad   R upper back 1cm red plaque  pigmented macules on trunk and ext with regular borders and pigment networks      Stuck on papules and brown macules on trunk and ext    Red papules on trunk   Flesh colored papules on trunk       Eyes: Conjunctivae/lids:Normal     ENT: Lips, buccal mucosa, tongue: normal    MSK:Normal    Cardiovascular: peripheral edema none    Pulm: Breathing  Normal    Neuro/Psych: Orientation:Normal; Mood/Affect:Normal      A/P:  1. Seborrheic keratosis, lentigo, angioma, dermal nevus, nevi   2. Melanoma 0.5mm  Decision dx discussed with patient and sent out  MELANOMA DISCUSSED WITH PATIENT:  I discussed the specifics of tumor, prognosis, metachronous melanoma, self exam, and genetics with the patient. I explained the need for monthly skin exams including and taught the patient how to do this. Patient was asked about new or changing moles . I discussed with patient signs and symptoms that could arise in the setting of recurrent locoregional or metastatic disease. In addition, the need to undergo every 4 month dermatologic full skin survey and evaluation given that patients with a diagnosis of melanoma are at risk of recurrence (local and distant) and of subsequent de kiley melanoma.    It was a pleasure speaking to Yola Amin today.  Previous clinic  notes and pertinent laboratory tests were reviewed prior to Yola Amin's visit.  Signs and Symptoms of skin cancer discussed with patient.  Patient encouraged to perform monthly skin exams.  UV precautions reviewed with patient.  Return to clinic 4 months  PROCEDURE NOTe  R upper back melanoma 0.5mm  MELANOMA DISCUSSED WITH PATIENT:  I discussed the specifics of tumor, prognosis, metachronous melanoma, self exam, and genetics with the patient. I explained the need for monthly skin exams including and taught the patient how to do this. Patient was asked about new or changing moles . I discussed with patient signs and symptoms that could arise in the setting of recurrent locoregional or metastatic disease. In addition, the need to undergo every 4 month dermatologic full skin survey and evaluation given that patients with a diagnosis of melanoma are at risk of recurrence (local and distant) and of subsequent de kiley melanoma.  . I reviewed treatment options, including a discussion of wide excision (the gold  standard) versus Mohs surgery with MART-1 immunostains.     Note: MART-1 (Melanoma Antigen Recognized by T-cells) antibody immunostaining was used during Mohs surgery as per standard protocol, in addition to routine processing of all specimens with hematoxylin and eosin. The peripheral margins/edges were processed with the MART-1 stain (4 specimens total). The center was examined with hematoxylin & eosin and MART-1 immunostains. The patient was informed of the procedure and its risk/benefits during the consent for the procedure.    One or more of the reagents used in immunohistochemical testing in this case may not have been cleared or approved by the U.S. Food and Drug Administration (FDA). The FDA has determined that such clearance or approval is not necessary. These tests are used for clinical purposes. They should not be regarded as investigational or for research. These reagents  performance characteristics have been determined by [facility]. This laboratory is certified under the Clinical Laboratory Improvement Amendments of 1988 (CLIA-88) as qualified to perform high complexity clinical laboratory testing.      MOHS:   Aggressive histology    The rationale for Mohs surgery was discussed with the patient and consent was obtained.  The risks and benefits as well as alternatives to therapy were discussed, in detail.  Specifically, the risks of infection, scarring, bleeding, prolonged wound healing, incomplete removal, allergy to anesthesia, nerve injury and recurrence were addressed.  Indication for Mohs was Aggressive histology. Prior to the procedure, the treatment site was clearly identified and, if available, confirmed with previous photos and confirmed by the patient   All components of the Universal Protocol/PAUSE rule were completed.  The Mohs surgeon operated in two distinct and integrated capacities as the surgeon and pathologist.      The area was prepped with Betasept.  A rim of normal  appearing skin was marked circumferentially around the lesion.  The area was infiltrated with local anesthesia.  The tumor was first debulked to remove all clinically apparent tumor.  An incision following the standard Mohs approach was done and the specimen was oriented,mapped and placed in 5 block(s).  Each specimen was then chromacoded and processed in the Mohs laboratory using standard Mohs technique and submitted for frozen section histology.  Frozen section analysis showed  residual tumor but CLEAR MARGINS.    1st stage MART1 shows n esting of melanocytes in dermis.      The tumor was excised using standard Mohs technique in 2 stages(s).  MART 1 stains were performed on 4 specimens. CLEAR MARGINS OBTAINED and Final defect size was 2 x 2.6 cm.     We discussed the options for wound management in full with the patient including risks/benefits/ possible outcomes.      REPAIR COMPLEX: Because of the tightness of the surrounding skin and Because of the size and full thickness nature of the defect, Because of the tightness of the surrounding skin, To maintain form and function, and In order to avoid distortion, a complex closure was planned. After LE anesthesia and prep, Burow's triangles were excised in the relaxed skin tension lines. The wound edges were widely undermined greater than width of the defect on both sides by dissection in the subcutaneous plane until adequate tissue mobility was obtained. Hemostasis was obtained. The wound edges were closed in a layered fashion using Vicryl and Fast Absorbing Plain Gut sutures. Postoperative length was 4.7 cm.   EBL minimal; complications none; wound care routine.  The patient was discharged in good condition and will return in one week for wound evaluation.

## 2024-02-12 NOTE — LETTER
2/12/2024         RE: Yola Amin  [address] [place] [id number]        Dear Colleague,    Thank you for referring your patient, Yola Amin, to the [facility]. Please see a copy of my visit note below.    Surgical Office Location :   [facility]  [address] [place] [id number]      Yola Amin , a 63 year old year old female patient, I was asked to see by MEHRDAD Wood for evaluation and managment of melanoma 0.5mm on right upper back.  Patient has no other skin complaints today.  Remainder of the HPI, Meds, PMH, Allergies, FH, and SH was reviewed in chart.    Pertinent Hx:   Hx of melanoma   Past Medical History:   Diagnosis Date     Allergic rhinitis 2013     Hearing problem early 1990s    Hearing loss for high-pitched tones     Malignant melanoma (H)      Reduced vision 2017    visual migraines     Seasonal allergies      Tinnitus as long as I can remember    constant, but ignorable most of the time       Past Surgical History:   Procedure Laterality Date     BIOPSY  October 2022 & Dec 2022    Dec 22: skin from right temple following Moh's procedure; Oct 22: mole on forehead     bone marrow donation  1988     COLONOSCOPY  Spring 2017??     CYSTECTOMY OVARIAN BENIGN      left     ENDOCERVICAL CURETTAGE       MAMMOPLASTY REDUCTION BILATERAL Bilateral 03/22/2018    Procedure: MAMMOPLASTY REDUCTION BILATERAL;  Bilateral Breast Reduction;  Surgeon: AKIRA Vizcarra MD;  Location:  OR        Family History   Problem Relation Age of Onset     Diabetes Mother      Hypertension Mother      Rheumatologic Disease Mother      Obesity Mother      Cancer Father         Prostate cancer     Prostate Cancer Father      Cancer Maternal Grandmother         liver or pancreatic (not sure which)     Liver Disease Maternal Grandmother      Other Cancer Maternal Grandmother         liver cancer     Cerebrovascular Disease Maternal Grandfather       Cancer Paternal Grandmother      Cancer Paternal Grandfather         Prostate cancer     No Known Problems Sister      No Known Problems Sister      Anxiety Disorder Sister      Diabetes Sister      Obesity Sister      Obesity Sister      Melanoma No family hx of      Skin Cancer No family hx of        Social History     Socioeconomic History     Marital status:      Spouse name: Not on file     Number of children: Not on file     Years of education: Not on file     Highest education level: Not on file   Occupational History     Occupation: Industrial organizational psychologist   Tobacco Use     Smoking status: Never     Smokeless tobacco: Never   Substance and Sexual Activity     Alcohol use: Yes     Alcohol/week: 3.0 standard drinks of alcohol     Comment: 6-7 drinks per week     Drug use: No     Sexual activity: Not Currently     Partners: Female     Birth control/protection: None   Other Topics Concern     Parent/sibling w/ CABG, MI or angioplasty before 65F 55M? No   Social History Narrative    Work is going well.  Lives with wife.  Two cats.        Has a good support system.    Feels safe in all environments.    Wears seatbelt 100% of the time    Wears helmet while biking.    Denies history of abuse, past or present, physical, sexual or emotional.    12/21/17    Nancy Kasper PA-C         Social Determinants of Health     Financial Resource Strain: Low Risk  (11/30/2023)    Financial Resource Strain      Within the past 12 months, have you or your family members you live with been unable to get utilities (heat, electricity) when it was really needed?: No   Food Insecurity: Low Risk  (11/30/2023)    Food Insecurity      Within the past 12 months, did you worry that your food would run out before you got money to buy more?: No      Within the past 12 months, did the food you bought just not last and you didn t have money to get more?: No   Transportation Needs: Low Risk  (11/30/2023)    Transportation  Needs      Within the past 12 months, has lack of transportation kept you from medical appointments, getting your medicines, non-medical meetings or appointments, work, or from getting things that you need?: No   Physical Activity: Not on file   Stress: Not on file   Social Connections: Not on file   Interpersonal Safety: Not on file   Housing Stability: Low Risk  (11/30/2023)    Housing Stability      Do you have housing? : Yes      Are you worried about losing your housing?: No       Outpatient Encounter Medications as of 2/12/2024   Medication Sig Dispense Refill     escitalopram (LEXAPRO) 20 MG tablet        fexofenadine-pseudoePHEDrine (ALLEGRA-D 24) 180-240 MG per 24 hr tablet Take 1 tablet by mouth daily 14 tablet 1     Glucosamine-Chondroitin (GLUCOSAMINE CHONDR COMPLEX PO) Take 1 capsule by mouth daily        lisinopril (ZESTRIL) 10 MG tablet Take 1 tablet (10 mg) by mouth daily 90 tablet 1     Multiple Vitamin (MULTIVITAMIN) per tablet Take 1 tablet by mouth daily.       propranolol (INDERAL) 10 MG tablet TAKE 1 TO 2 TABLETS BY MOUTH TWICE DAILY AS NEEDED FOR ANXIETY       simvastatin (ZOCOR) 10 MG tablet Take 1 tablet (10 mg) by mouth at bedtime 90 tablet 1     hydrOXYzine HCl (ATARAX) 10 MG tablet TAKE 1 TO 2 TABLETS BY MOUTH UP TO THREE TIMES DAILY AS NEEDED FOR ANXIETY       No facility-administered encounter medications on file as of 2/12/2024.             Review Of Systems  Skin: As above  Eyes: negative  Ears/Nose/Throat: negative  Respiratory: No shortness of breath, dyspnea on exertion, cough, or hemoptysis  Cardiovascular: negative  Gastrointestinal: negative  Genitourinary: negative  Musculoskeletal: negative  Neurologic: negative  Psychiatric: negative  Hematologic/Lymphatic/Immunologic: negative  Endocrine: negative      O:   NAD, WDWN, Alert & Oriented, Mood & Affect wnl, Vitals stable   General appearance jesse ii   Vitals stable   Alert, oriented and in no acute distress      Following lymph  nodes palpated: axillar no lad   R upper back 1cm red plaque  pigmented macules on trunk and ext with regular borders and pigment networks      Stuck on papules and brown macules on trunk and ext    Red papules on trunk   Flesh colored papules on trunk       Eyes: Conjunctivae/lids:Normal     ENT: Lips, buccal mucosa, tongue: normal    MSK:Normal    Cardiovascular: peripheral edema none    Pulm: Breathing Normal    Neuro/Psych: Orientation:Normal; Mood/Affect:Normal      A/P:  1. Seborrheic keratosis, lentigo, angioma, dermal nevus, nevi   2. Melanoma 0.5mm  Decision dx discussed with patient and sent out  MELANOMA DISCUSSED WITH PATIENT:  I discussed the specifics of tumor, prognosis, metachronous melanoma, self exam, and genetics with the patient. I explained the need for monthly skin exams including and taught the patient how to do this. Patient was asked about new or changing moles . I discussed with patient signs and symptoms that could arise in the setting of recurrent locoregional or metastatic disease. In addition, the need to undergo every 4 month dermatologic full skin survey and evaluation given that patients with a diagnosis of melanoma are at risk of recurrence (local and distant) and of subsequent de kiley melanoma.    It was a pleasure speaking to Yola Amin today.  Previous clinic  notes and pertinent laboratory tests were reviewed prior to Yola Amin's visit.  Signs and Symptoms of skin cancer discussed with patient.  Patient encouraged to perform monthly skin exams.  UV precautions reviewed with patient.  Return to clinic 4 months  PROCEDURE NOTe  R upper back melanoma 0.5mm  MELANOMA DISCUSSED WITH PATIENT:  I discussed the specifics of tumor, prognosis, metachronous melanoma, self exam, and genetics with the patient. I explained the need for monthly skin exams including and taught the patient how to do this. Patient was asked about new or changing moles . I discussed with patient signs  and symptoms that could arise in the setting of recurrent locoregional or metastatic disease. In addition, the need to undergo every 4 month dermatologic full skin survey and evaluation given that patients with a diagnosis of melanoma are at risk of recurrence (local and distant) and of subsequent de kiley melanoma.  . I reviewed treatment options, including a discussion of wide excision (the gold standard) versus Mohs surgery with MART-1 immunostains.     Note: MART-1 (Melanoma Antigen Recognized by T-cells) antibody immunostaining was used during Mohs surgery as per standard protocol, in addition to routine processing of all specimens with hematoxylin and eosin. The peripheral margins/edges were processed with the MART-1 stain (4 specimens total). The center was examined with hematoxylin & eosin and MART-1 immunostains. The patient was informed of the procedure and its risk/benefits during the consent for the procedure.    One or more of the reagents used in immunohistochemical testing in this case may not have been cleared or approved by the U.S. Food and Drug Administration (FDA). The FDA has determined that such clearance or approval is not necessary. These tests are used for clinical purposes. They should not be regarded as investigational or for research. These reagents  performance characteristics have been determined by [facility]. This laboratory is certified under the Clinical Laboratory Improvement Amendments of 1988 (CLIA-88) as qualified to perform high complexity clinical laboratory testing.      MOHS:   Aggressive histology    The rationale for Mohs surgery was discussed with the patient and consent was obtained.  The risks and benefits as well as alternatives to therapy were discussed, in detail.  Specifically, the risks of infection, scarring, bleeding, prolonged wound healing, incomplete removal, allergy to anesthesia, nerve injury and recurrence were addressed.  Indication for Mohs  was Aggressive histology. Prior to the procedure, the treatment site was clearly identified and, if available, confirmed with previous photos and confirmed by the patient   All components of the Universal Protocol/PAUSE rule were completed.  The Mohs surgeon operated in two distinct and integrated capacities as the surgeon and pathologist.      The area was prepped with Betasept.  A rim of normal appearing skin was marked circumferentially around the lesion.  The area was infiltrated with local anesthesia.  The tumor was first debulked to remove all clinically apparent tumor.  An incision following the standard Mohs approach was done and the specimen was oriented,mapped and placed in 5 block(s).  Each specimen was then chromacoded and processed in the Mohs laboratory using standard Mohs technique and submitted for frozen section histology.  Frozen section analysis showed  residual tumor but CLEAR MARGINS.    1st stage MART1 shows n esting of melanocytes in dermis.      The tumor was excised using standard Mohs technique in 2 stages(s).  MART 1 stains were performed on 4 specimens. CLEAR MARGINS OBTAINED and Final defect size was 2 x 2.6 cm.     We discussed the options for wound management in full with the patient including risks/benefits/ possible outcomes.      REPAIR COMPLEX: Because of the tightness of the surrounding skin and Because of the size and full thickness nature of the defect, Because of the tightness of the surrounding skin, To maintain form and function, and In order to avoid distortion, a complex closure was planned. After LE anesthesia and prep, Burow's triangles were excised in the relaxed skin tension lines. The wound edges were widely undermined greater than width of the defect on both sides by dissection in the subcutaneous plane until adequate tissue mobility was obtained. Hemostasis was obtained. The wound edges were closed in a layered fashion using Vicryl and Fast Absorbing Plain Gut  sutures. Postoperative length was 4.7 cm.   EBL minimal; complications none; wound care routine.  The patient was discharged in good condition and will return in one week for wound evaluation.        Again, thank you for allowing me to participate in the care of your patient.        Sincerely,        Ra Calixto MD

## 2024-02-12 NOTE — PATIENT INSTRUCTIONS
Sutured Wound Care     [facility]: [phone]    [facility]: [phone]          No strenuous activity for 48 hours. Resume moderate activity in 48 hours. No heavy exercising until you are seen for follow up in one week.     Take Tylenol as needed for discomfort.                         Do not drink alcoholic beverages for 48 hours.     Keep the pressure bandage in place for 24 hours. (White Guaze)  If the bandage becomes blood tinged or loose, reinforce it with gauze and tape.        (Refer to the reverse side of this page for management of bleeding).    Remove pressure bandage in 24 hours (White Guaze)     Leave the flat bandage (Blue Tape)  in place until your follow up appointment. 2024    Keep the bandage dry. Wash around it carefully.    If the tape becomes soiled or starts to come off, reinforce it with additional paper tape.    Do not smoke for 3 weeks; smoking is detrimental to wound healing.    It is normal to have swelling and bruising around the surgical site. The bruising will fade in approximately 10-14 days. Elevate the area to reduce swelling.    Numbness, itchiness and sensitivity to temperature changes can occur after surgery and may take up to 18 months to normalize.      POSSIBLE COMPLICATIONS    BLEEDING:    Leave the bandage in place.  Use tightly rolled up gauze or a cloth to apply direct pressure over the bandage for 20   minutes.  Reapply pressure for an additional 20 minutes if necessary  Call the office or go to the nearest emergency room if pressure fails to stop the bleeding.  Use additional gauze and tape to maintain pressure once the bleeding has stopped.        PAIN:    Post operative pain should slowly get better, never worse.  A severe increase in pain may indicate a problem. Call the office if this occurs.    In case of emergency phone:Dr Calixto [phone]

## 2024-02-12 NOTE — NURSING NOTE
Yola Amin's chief complaint for this visit includes:  Chief Complaint   Patient presents with    Derm Problem     Mohs- right upper back     PCP: Queenie Carcamo    Referring Provider:  Amada Wood PA-C  [address] [facility] [address], [facility] [address]  [place] [address]    [facility] 02/01/2015   No Pain (0)      No Known Allergies      Do you need any medication refills at today's visit? No    Renetta Carrasco MA

## 2024-02-19 ENCOUNTER — ALLIED HEALTH/NURSE VISIT (OUTPATIENT)
Dept: FAMILY MEDICINE | Facility: CLINIC | Age: 64
End: 2024-02-19
Payer: COMMERCIAL

## 2024-02-19 DIAGNOSIS — Z51.89 VISIT FOR WOUND CHECK: Primary | ICD-10-CM

## 2024-02-19 PROCEDURE — 99207 PR NO CHARGE NURSE ONLY: CPT | Performed by: DERMATOLOGY

## 2024-02-19 NOTE — PROGRESS NOTES
"Yola Amin comes into clinic today at the request of Dr. Calixto Ordering Provider for Wound Check Action taken: See Below.    This service provided today was under the supervising provider of the day Dr. Calixto, who was available if needed.    Pt returned to clinic for post surgery 1 week follow up bandage change. Pt has no complaints, denies pain. Bandage removed from right upper back, and pt's sutures are broken and now has an open wound.  Nurse took a photo of the open wound.  S/w Dr. Calixto who advised open wound care and schedule with him in 1 month.  Nurse cleaned the area, applied aquaphor and covered with a bandage.  Went over wound care directions with pt and scheduled with Dr. Calixto on 3/14 at 9:45 am at .    Advised to watch for signs/sx of infection; spreading redness, drainage, odor, fever. Call or report promptly to clinic. Pt given written instructions and informed to rtc as needed. Patient verbalized understanding.     Photo is in Media under \"sent via secure chat\" on 2/19/24.    Mary RIOS RN  [facility] [place] [place]  [phone]    "

## 2024-02-19 NOTE — PATIENT INSTRUCTIONS
Wound Care Instructions     [facility] [phone]    [facility] [phone]    BEGIN DAILY DRESSING CHANGES AS FOLLOWS:     1) Remove Dressing.     2) Clean and dry the area with tap water using a Q-tip or sterile gauze pad.     3) Apply Vaseline, Aquaphor, Polysporin ointment or Bacitracin ointment over entire wound.  Do NOT use Neosporin ointment.     4) Cover the wound with a band-aid, or a sterile non-stick gauze pad and micropore paper tape      REPEAT THESE INSTRUCTIONS AT LEAST ONCE A DAY UNTIL THE WOUND HAS COMPLETELY HEALED.    It is an old wives tale that a wound heals better when it is exposed to air and allowed to dry out. The wound will heal faster with a better cosmetic result if it is kept moist with ointment and covered with a bandage.    **Do not let the wound dry out.**      Supplies Needed:      *Cotton tipped applicators (Q-tips)    *Polysporin Ointment or Bacitracin Ointment (NOT NEOSPORIN)    *Band-aids or non-stick gauze pads and micropore paper tape.      PATIENT INFORMATION:    During the healing process you will notice a number of changes. All wounds develop a small halo of redness surrounding the wound.  This means healing is occurring. Severe itching with extensive redness usually indicates sensitivity to the ointment or bandage tape used to dress the wound.  You should call our office if this develops.      Swelling  and/or discoloration around your surgical site is common, particularly when performed around the eye.    All wounds normally drain.  The larger the wound the more drainage there will be.  After 7-10 days, you will notice the wound beginning to shrink and new skin will begin to grow.  The wound is healed when you can see skin has formed over the entire area.  A healed wound has a healthy, shiny look to the surface and is red to dark pink in color to normalize.  Wounds may take approximately 4-6 weeks to heal.  Larger wounds may take 6-8  weeks.  After the wound is healed you may discontinue dressing changes.    You may experience a sensation of tightness as your wound heals. This is normal and will gradually subside.    Your healed wound may be sensitive to temperature changes. This sensitivity improves with time, but if you re having a lot of discomfort, try to avoid temperature extremes.    Patients frequently experience itching after their wound appears to have healed because of the continue healing under the skin.  Plain Vaseline will help relieve the itching.        POSSIBLE COMPLICATIONS    BLEEDING:    Leave the bandage in place.  Use tightly rolled up gauze or a cloth to apply direct pressure over the bandage for 30  minutes.  Reapply pressure for an additional 30 minutes if necessary  Use additional gauze and tape to maintain pressure once the bleeding has stopped.                Patient Education       Proper skin care from [facility]:    -Eliminate harsh soaps as they strip the natural oils from the skin, often resulting in dry itchy skin ( i.e. Dial, Zest, Nanette Spring)  -Use mild soaps such as Cetaphil or Dove Sensitive Skin in the shower. You do not need to use soap on arms, legs, and trunk every time you shower unless visibly soiled.   -Avoid hot or cold showers.  -After showering, lightly dry off and apply moisturizing within 2-3 minutes. This will help trap moisture in the skin.   -Aggressive use of a moisturizer at least 1-2 times a day to the entire body (including -Vanicream, Cetaphil, Aquaphor or Cerave) and moisturize hands after every washing.  -We recommend using moisturizers that come in a tub that needs to be scooped out, not a pump. This has more of an oil base. It will hold moisture in your skin much better than a water base moisturizer. The above recommended are non-pore clogging.      Wear a sunscreen with at least SPF 30 on your face, ears, neck and V of the chest daily. Wear sunscreen on other areas of the  body if those areas are exposed to the sun throughout the day. Sunscreens can contain physical and/or chemical blockers. Physical blockers are less likely to clog pores, these include zinc oxide and titanium dioxide. Reapply every two hour and after swimming.     Sunscreen examples: https://www.ewg.org/sunscreen/    UV radiation  UVA radiation remains constant throughout the day and throughout the year. It is a longer wavelength than UVB and therefore penetrates deeper into the skin leading to immediate and delayed tanning, photoaging, and skin cancer. 70-80% of UVA and UVB radiation occurs between the hours of 10am-2pm.  UVB radiation  UVB radiation causes the most harmful effects and is more significant during the summer months. However, snow and ice can reflect UVB radiation leading to skin damage during the winter months as well. UVB radiation is responsible for tanning, burning, inflammation, delayed erythema (pinkness), pigmentation (brown spots), and skin cancer.     I recommend self monthly full body exams and yearly full body exams with a dermatology provider. If you develop a new or changing lesion please follow up for examination. Most skin cancers are pink and scaly or pink and pearly. However, we do see blue/brown/black skin cancers.  Consider the ABCDEs of melanoma when giving yourself your monthly full body exam ( don't forget the groin, buttocks, feet, toes, etc). A-asymmetry, B-borders, C-color, D-diameter, E-elevation or evolving. If you see any of these changes please follow up in clinic. If you cannot see your back I recommend purchasing a hand held mirror to use with a larger wall mirror.       Checking for Skin Cancer  You can find cancer early by checking your skin each month. There are 3 kinds of skin cancer. They are melanoma, basal cell carcinoma, and squamous cell carcinoma. Doing monthly skin checks is the best way to find new marks or skin changes. Follow the instructions below for  checking your skin.   The ABCDEs of checking moles for melanoma   Check your moles or growths for signs of melanoma using ABCDE:   Asymmetry: the sides of the mole or growth don t match  Border: the edges are ragged, notched, or blurred  Color: the color within the mole or growth varies  Diameter: the mole or growth is larger than 6 mm (size of a pencil eraser)  Evolving: the size, shape, or color of the mole or growth is changing (evolving is not shown in the images below)    Checking for other types of skin cancer  Basal cell carcinoma or squamous cell carcinoma have symptoms such as:     A spot or mole that looks different from all other marks on your skin  Changes in how an area feels, such as itching, tenderness, or pain  Changes in the skin's surface, such as oozing, bleeding, or scaliness  A sore that does not heal  New swelling or redness beyond the border of a mole    Who s at risk?  Anyone can get skin cancer. But you are at greater risk if you have:   Fair skin, light-colored hair, or light-colored eyes  Many moles or abnormal moles on your skin  A history of sunburns from sunlight or tanning beds  A family history of skin cancer  A history of exposure to radiation or chemicals  A weakened immune system  If you have had skin cancer in the past, you are at risk for recurring skin cancer.   How to check your skin  Do your monthly skin checkups in front of a full-length mirror. Check all parts of your body, including your:   Head (ears, face, neck, and scalp)  Torso (front, back, and sides)  Arms (tops, undersides, upper, and lower armpits)  Hands (palms, backs, and fingers, including under the nails)  Buttocks and genitals  Legs (front, back, and sides)  Feet (tops, soles, toes, including under the nails, and between toes)  If you have a lot of moles, take digital photos of them each month. Make sure to take photos both up close and from a distance. These can help you see if any moles change over time.    Most skin changes are not cancer. But if you see any changes in your skin, call your doctor right away. Only he or she can diagnose a problem. If you have skin cancer, seeing your doctor can be the first step toward getting the treatment that could save your life.   [employer] last reviewed this educational content on 4/1/2019 2000-2020 [employer]. [address] [place] [address], [place] [address]. All rights reserved. This information is not intended as a substitute for professional medical care. Always follow your healthcare professional's instructions.       When should I call my doctor?  If you are worsening or not improving, please, contact us or seek urgent care as noted below.     Who should I call with questions (adults)?  [facility] (adult and pediatric): [phone]  [facility] (adult): [phone]  [facility] ([facility] [place]) [phone]  For urgent needs outside of business hours call the [facility] at [phone] and ask for the dermatology resident on call to be paged  If this is a medical emergency and you are unable to reach an ER, Call [phone]      If you need a prescription refill, please contact your pharmacy. Refills are approved or denied by our Physicians during normal business hours, Monday through Fridays  Per office policy, refills will not be granted if you have not been seen within the past year (or sooner depending on your child's condition)

## 2024-03-14 ENCOUNTER — OFFICE VISIT (OUTPATIENT)
Dept: DERMATOLOGY | Facility: CLINIC | Age: 64
End: 2024-03-14
Payer: COMMERCIAL

## 2024-03-14 DIAGNOSIS — Z86.006 HISTORY OF MELANOMA IN SITU: Primary | ICD-10-CM

## 2024-03-14 PROCEDURE — 99212 OFFICE O/P EST SF 10 MIN: CPT | Performed by: DERMATOLOGY

## 2024-03-14 NOTE — PROGRESS NOTES
Yola Amin is an extremely pleasant 63 year old year old female patient here today for wound check on right back after dehiscence. Healing well no issues.  Patient has no other skin complaints today.  Remainder of the HPI, Meds, PMH, Allergies, FH, and SH was reviewed in chart.      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    CYSTECTOMY OVARIAN BENIGN      left    ENDOCERVICAL CURETTAGE      MAMMOPLASTY REDUCTION BILATERAL Bilateral 03/22/2018    Procedure: MAMMOPLASTY REDUCTION BILATERAL;  Bilateral Breast Reduction;  Surgeon: AKIRA Vizcarra MD;  Location:  OR        Family History   Problem Relation Age of Onset    Diabetes Mother     Hypertension Mother     Rheumatologic Disease Mother     Obesity Mother     Cancer Father         Prostate cancer    Prostate Cancer Father     Cancer Maternal Grandmother         liver or pancreatic (not sure which)    Liver Disease Maternal Grandmother     Other Cancer Maternal Grandmother         liver cancer    Cerebrovascular Disease Maternal Grandfather     Cancer Paternal Grandmother     Cancer Paternal Grandfather         Prostate cancer    No Known Problems Sister     No Known Problems Sister     Anxiety Disorder Sister     Diabetes Sister     Obesity Sister     Obesity Sister     Melanoma No family hx of     Skin Cancer No family hx of        Social History     Socioeconomic History    Marital status:      Spouse name: Not on file    Number of children:  Not on file    Years of education: Not on file    Highest education level: Not on file   Occupational History    Occupation: Industrial organizational psychologist   Tobacco Use    Smoking status: Never    Smokeless tobacco: Never   Substance and Sexual Activity    Alcohol use: Yes     Alcohol/week: 3.0 standard drinks of alcohol     Comment: 6-7 drinks per week    Drug use: No    Sexual activity: Not Currently     Partners: Female     Birth control/protection: None   Other Topics Concern    Parent/sibling w/ CABG, MI or angioplasty before 65F 55M? No   Social History Narrative    Work is going well.  Lives with wife.  Two cats.        Has a good support system.    Feels safe in all environments.    Wears seatbelt 100% of the time    Wears helmet while biking.    Denies history of abuse, past or present, physical, sexual or emotional.    12/21/17    Nancy Kasper PA-C         Social Determinants of Health     Financial Resource Strain: Low Risk  (11/30/2023)    Financial Resource Strain     Within the past 12 months, have you or your family members you live with been unable to get utilities (heat, electricity) when it was really needed?: No   Food Insecurity: Low Risk  (11/30/2023)    Food Insecurity     Within the past 12 months, did you worry that your food would run out before you got money to buy more?: No     Within the past 12 months, did the food you bought just not last and you didn t have money to get more?: No   Transportation Needs: Low Risk  (11/30/2023)    Transportation Needs     Within the past 12 months, has lack of transportation kept you from medical appointments, getting your medicines, non-medical meetings or appointments, work, or from getting things that you need?: No   Physical Activity: Not on file   Stress: Not on file   Social Connections: Not on file   Interpersonal Safety: Not on file   Housing Stability: Low Risk  (11/30/2023)    Housing Stability     Do you have housing? : Yes     Are  you worried about losing your housing?: No       Outpatient Encounter Medications as of 3/14/2024   Medication Sig Dispense Refill    escitalopram (LEXAPRO) 20 MG tablet       fexofenadine-pseudoePHEDrine (ALLEGRA-D 24) 180-240 MG per 24 hr tablet Take 1 tablet by mouth daily 14 tablet 1    Glucosamine-Chondroitin (GLUCOSAMINE CHONDR COMPLEX PO) Take 1 capsule by mouth daily       hydrOXYzine HCl (ATARAX) 10 MG tablet TAKE 1 TO 2 TABLETS BY MOUTH UP TO THREE TIMES DAILY AS NEEDED FOR ANXIETY      lisinopril (ZESTRIL) 10 MG tablet Take 1 tablet (10 mg) by mouth daily 90 tablet 1    Multiple Vitamin (MULTIVITAMIN) per tablet Take 1 tablet by mouth daily.      propranolol (INDERAL) 10 MG tablet TAKE 1 TO 2 TABLETS BY MOUTH TWICE DAILY AS NEEDED FOR ANXIETY      simvastatin (ZOCOR) 10 MG tablet Take 1 tablet (10 mg) by mouth at bedtime 90 tablet 1     No facility-administered encounter medications on file as of 3/14/2024.             O:   NAD, WDWN, Alert & Oriented, Mood & Affect wnl, Vitals stable   General appearance normal   Vitals stable   Alert, oriented and in no acute distress     R back healthy granulating wound      Eyes: Conjunctivae/lids:Normal     ENT: Lips, buccal mucosa, tongue: normal    MSK:Normal    Cardiovascular: peripheral edema none    Pulm: Breathing Normal    Neuro/Psych: Orientation:Alert and Orientedx3 ; Mood/Affect:normal       A/P:  Hx of melanoma  Wound care discussed with patient   Healing well  Return to clinic 1 month  It was a pleasure speaking to Yola Amin today.  Previous clinic notes and pertinent laboratory tests were reviewed prior to Yola Amin's visit.

## 2024-03-14 NOTE — LETTER
3/14/2024         RE: Yola Amin  [address]  [place] [address]        Dear Colleague,    Thank you for referring your patient, Yola Amin, to the [facility]. Please see a copy of my visit note below.    Yola Amin is an extremely pleasant 63 year old year old female patient here today for wound check on right back after dehiscence. Healing well no issues.  Patient has no other skin complaints today.  Remainder of the HPI, Meds, PMH, Allergies, FH, and SH was reviewed in chart.      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     CYSTECTOMY OVARIAN BENIGN      left     ENDOCERVICAL CURETTAGE       MAMMOPLASTY REDUCTION BILATERAL Bilateral 03/22/2018    Procedure: MAMMOPLASTY REDUCTION BILATERAL;  Bilateral Breast Reduction;  Surgeon: AKIRA Vizcarra MD;  Location: [facility] OR        Family History   Problem Relation Age of Onset     Diabetes Mother      Hypertension Mother      Rheumatologic Disease Mother      Obesity Mother      Cancer Father         Prostate cancer     Prostate Cancer Father      Cancer Maternal Grandmother         liver or pancreatic (not sure which)     Liver Disease Maternal Grandmother      Other Cancer Maternal Grandmother         liver cancer     Cerebrovascular Disease Maternal Grandfather      Cancer Paternal Grandmother      Cancer Paternal Grandfather         Prostate cancer     No Known Problems Sister      No  Known Problems Sister      Anxiety Disorder Sister      Diabetes Sister      Obesity Sister      Obesity Sister      Melanoma No family hx of      Skin Cancer No family hx of        Social History     Socioeconomic History     Marital status:      Spouse name: Not on file     Number of children: Not on file     Years of education: Not on file     Highest education level: Not on file   Occupational History     Occupation: Industrial organizational psychologist   Tobacco Use     Smoking status: Never     Smokeless tobacco: Never   Substance and Sexual Activity     Alcohol use: Yes     Alcohol/week: 3.0 standard drinks of alcohol     Comment: 6-7 drinks per week     Drug use: No     Sexual activity: Not Currently     Partners: Female     Birth control/protection: None   Other Topics Concern     Parent/sibling w/ CABG, MI or angioplasty before 65F 55M? No   Social History Narrative    Work is going well.  Lives with wife.  Two cats.        Has a good support system.    Feels safe in all environments.    Wears seatbelt 100% of the time    Wears helmet while biking.    Denies history of abuse, past or present, physical, sexual or emotional.    12/21/17    Nancy Kasper PA-C         Social Determinants of Health     Financial Resource Strain: Low Risk  (11/30/2023)    Financial Resource Strain      Within the past 12 months, have you or your family members you live with been unable to get utilities (heat, electricity) when it was really needed?: No   Food Insecurity: Low Risk  (11/30/2023)    Food Insecurity      Within the past 12 months, did you worry that your food would run out before you got money to buy more?: No      Within the past 12 months, did the food you bought just not last and you didn t have money to get more?: No   Transportation Needs: Low Risk  (11/30/2023)    Transportation Needs      Within the past 12 months, has lack of transportation kept you from medical appointments, getting your medicines,  non-medical meetings or appointments, work, or from getting things that you need?: No   Physical Activity: Not on file   Stress: Not on file   Social Connections: Not on file   Interpersonal Safety: Not on file   Housing Stability: Low Risk  (11/30/2023)    Housing Stability      Do you have housing? : Yes      Are you worried about losing your housing?: No       Outpatient Encounter Medications as of 3/14/2024   Medication Sig Dispense Refill     escitalopram (LEXAPRO) 20 MG tablet        fexofenadine-pseudoePHEDrine (ALLEGRA-D 24) 180-240 MG per 24 hr tablet Take 1 tablet by mouth daily 14 tablet 1     Glucosamine-Chondroitin (GLUCOSAMINE CHONDR COMPLEX PO) Take 1 capsule by mouth daily        hydrOXYzine HCl (ATARAX) 10 MG tablet TAKE 1 TO 2 TABLETS BY MOUTH UP TO THREE TIMES DAILY AS NEEDED FOR ANXIETY       lisinopril (ZESTRIL) 10 MG tablet Take 1 tablet (10 mg) by mouth daily 90 tablet 1     Multiple Vitamin (MULTIVITAMIN) per tablet Take 1 tablet by mouth daily.       propranolol (INDERAL) 10 MG tablet TAKE 1 TO 2 TABLETS BY MOUTH TWICE DAILY AS NEEDED FOR ANXIETY       simvastatin (ZOCOR) 10 MG tablet Take 1 tablet (10 mg) by mouth at bedtime 90 tablet 1     No facility-administered encounter medications on file as of 3/14/2024.             O:   NAD, WDWN, Alert & Oriented, Mood & Affect wnl, Vitals stable   General appearance normal   Vitals stable   Alert, oriented and in no acute distress     R back healthy granulating wound      Eyes: Conjunctivae/lids:Normal     ENT: Lips, buccal mucosa, tongue: normal    MSK:Normal    Cardiovascular: peripheral edema none    Pulm: Breathing Normal    Neuro/Psych: Orientation:Alert and Orientedx3 ; Mood/Affect:normal       A/P:  Hx of melanoma  Wound care discussed with patient   Healing well  Return to clinic 1 month  It was a pleasure speaking to Yola Amin today.  Previous clinic notes and pertinent laboratory tests were reviewed prior to Yola Amin's  visit.      Again, thank you for allowing me to participate in the care of your patient.        Sincerely,        Ra Calixto MD

## 2024-04-25 ENCOUNTER — VIRTUAL VISIT (OUTPATIENT)
Dept: DERMATOLOGY | Facility: CLINIC | Age: 64
End: 2024-04-25
Payer: COMMERCIAL

## 2024-04-25 DIAGNOSIS — Z86.006 HISTORY OF MELANOMA IN SITU: Primary | ICD-10-CM

## 2024-04-25 PROCEDURE — 99442 PR PHYSICIAN TELEPHONE EVALUATION 11-20 MIN: CPT | Mod: 93 | Performed by: DERMATOLOGY

## 2024-04-25 NOTE — LETTER
"    4/25/2024         RE: Yola Amin  [address] [address]        Dear Colleague,    Thank you for referring your patient, Yola Amin, to the [facility]. Please see a copy of my visit note below.        Yola Amin is a 63 year old female who is being evaluated via a phone  visit.      The patient has been notified of following:     \"This phone  visit will be conducted via a call between you and your physician/provider. We have found that certain health care needs can be provided without the need for an in-person physical exam.  This service lets us provide the care you need with a video conversation.  If a prescription is necessary we can send it directly to your pharmacy.  If lab work is needed we can place an order for that and you can then stop by our lab to have the test done at a later time.    Phone visits are billed at different rates depending on your insurance coverage.  Please reach out to your insurance provider with any questions.    If during the course of the call the physician/provider feels a phone visit is not appropriate, you will not be charged for this service.\"    Patient has given verbal consent for phone visit? Yes    How would you like to obtain your AVS? MyChart    Yola Amin is a 63 year old year old female patient here today for follow up hx of melanoma in situ , wound healing well.  Patient has no other skin complaints today.  Remainder of the HPI, Meds, PMH, Allergies, FH, and SH was reviewed in chart.      Past Medical History:   Diagnosis Date     Allergic rhinitis 2013     Hearing problem early 1990s    Hearing loss for high-pitched tones     Malignant melanoma (H)      Reduced vision 2017    visual migraines     Seasonal allergies      Tinnitus as long as I can remember    constant, but ignorable most of the time       Past Surgical History:   Procedure Laterality Date     BIOPSY  October 2022 & Dec 2022 "    Dec 22: skin from right temple following Moh's procedure; Oct 22: mole on forehead     bone marrow donation  1988     COLONOSCOPY  Spring 2017??     CYSTECTOMY OVARIAN BENIGN      left     ENDOCERVICAL CURETTAGE       MAMMOPLASTY REDUCTION BILATERAL Bilateral 03/22/2018    Procedure: MAMMOPLASTY REDUCTION BILATERAL;  Bilateral Breast Reduction;  Surgeon: AKIRA Vizcarra MD;  Location: [facility] OR        Family History   Problem Relation Age of Onset     Diabetes Mother      Hypertension Mother      Rheumatologic Disease Mother      Obesity Mother      Cancer Father         Prostate cancer     Prostate Cancer Father      Cancer Maternal Grandmother         liver or pancreatic (not sure which)     Liver Disease Maternal Grandmother      Other Cancer Maternal Grandmother         liver cancer     Cerebrovascular Disease Maternal Grandfather      Cancer Paternal Grandmother      Cancer Paternal Grandfather         Prostate cancer     No Known Problems Sister      No Known Problems Sister      Anxiety Disorder Sister      Diabetes Sister      Obesity Sister      Obesity Sister      Melanoma No family hx of      Skin Cancer No family hx of        Social History     Socioeconomic History     Marital status:      Spouse name: Not on file     Number of children: Not on file     Years of education: Not on file     Highest education level: Not on file   Occupational History     Occupation: Industrial organizational psychologist   Tobacco Use     Smoking status: Never     Smokeless tobacco: Never   Substance and Sexual Activity     Alcohol use: Yes     Alcohol/week: 3.0 standard drinks of alcohol     Comment: 6-7 drinks per week     Drug use: No     Sexual activity: Not Currently     Partners: Female     Birth control/protection: None   Other Topics Concern     Parent/sibling w/ CABG, MI or angioplasty before 65F 55M? No   Social History Narrative    Work is going well.  Lives with wife.  Two cats.        Has a good  support system.    Feels safe in all environments.    Wears seatbelt 100% of the time    Wears helmet while biking.    Denies history of abuse, past or present, physical, sexual or emotional.    12/21/17    Nancy Kasper PA-C         Social Determinants of Health     Financial Resource Strain: Low Risk  (11/30/2023)    Financial Resource Strain      Within the past 12 months, have you or your family members you live with been unable to get utilities (heat, electricity) when it was really needed?: No   Food Insecurity: Low Risk  (11/30/2023)    Food Insecurity      Within the past 12 months, did you worry that your food would run out before you got money to buy more?: No      Within the past 12 months, did the food you bought just not last and you didn t have money to get more?: No   Transportation Needs: Low Risk  (11/30/2023)    Transportation Needs      Within the past 12 months, has lack of transportation kept you from medical appointments, getting your medicines, non-medical meetings or appointments, work, or from getting things that you need?: No   Physical Activity: Not on file   Stress: Not on file   Social Connections: Not on file   Interpersonal Safety: Not on file   Housing Stability: Low Risk  (11/30/2023)    Housing Stability      Do you have housing? : Yes      Are you worried about losing your housing?: No       Outpatient Encounter Medications as of 4/25/2024   Medication Sig Dispense Refill     escitalopram (LEXAPRO) 20 MG tablet        fexofenadine-pseudoePHEDrine (ALLEGRA-D 24) 180-240 MG per 24 hr tablet Take 1 tablet by mouth daily 14 tablet 1     Glucosamine-Chondroitin (GLUCOSAMINE CHONDR COMPLEX PO) Take 1 capsule by mouth daily        hydrOXYzine HCl (ATARAX) 10 MG tablet TAKE 1 TO 2 TABLETS BY MOUTH UP TO THREE TIMES DAILY AS NEEDED FOR ANXIETY       lisinopril (ZESTRIL) 10 MG tablet Take 1 tablet (10 mg) by mouth daily 90 tablet 1     Multiple Vitamin (MULTIVITAMIN) per tablet Take 1 tablet  by mouth daily.       propranolol (INDERAL) 10 MG tablet TAKE 1 TO 2 TABLETS BY MOUTH TWICE DAILY AS NEEDED FOR ANXIETY       simvastatin (ZOCOR) 10 MG tablet Take 1 tablet (10 mg) by mouth at bedtime 90 tablet 1     No facility-administered encounter medications on file as of 4/25/2024.             Review Of Systems  Skin: As above  Eyes: negative  Ears/Nose/Throat: negative  Respiratory: No shortness of breath, dyspnea on exertion, cough, or hemoptysis  Cardiovascular: negative  Gastrointestinal: negative  Genitourinary: negative  Musculoskeletal: negative  Neurologic: negative  Psychiatric: negative  Hematologic/Lymphatic/Immunologic: negative  Endocrine: negative      O:   Alert & Orientedx3, Mood & Affect wnl,    General appearance normal   Alert, oriented and in no acute distress     Photos:healthy healing wound on shoulder       Pulm: Breathing Normal, talking in normal sentences, no shortness of breath during conversation    Neuro/Psych: Orientation:Alert and Orientedx3 ; Mood/Affect:normal ; no anxiety or depression     A/P:  1.hx of melanoma in situ   Wound healing well   Cont wound care  Return to clinic 1 month  It was a pleasure speaking to Yola Amin today.  Previous clinic  notes and pertinent laboratory tests were reviewed prior to Yola Amin's visit.    Teledermatology information:  - Location of patient: home  - Location of teledermatologist: [facility]   - Reason teledermatology is appropriate: of National Emergency Regarding Coronavirus disease (COVID 19) Outbreak  - The patient's condition can safely be assessed using telemedicine: yes  - Method of transmission: store and forward teledermatology  - In-person dermatology visit recommendation: no  - Service start time:930am/pm  - Service end time:954am/pm  - Date of report: 04/25/24      Again, thank you for allowing me to participate in the care of your patient.        Sincerely,        Ra Calixto MD

## 2024-04-25 NOTE — PROGRESS NOTES
"    Yola Amin is a 63 year old female who is being evaluated via a phone  visit.      The patient has been notified of following:     \"This phone  visit will be conducted via a call between you and your physician/provider. We have found that certain health care needs can be provided without the need for an in-person physical exam.  This service lets us provide the care you need with a video conversation.  If a prescription is necessary we can send it directly to your pharmacy.  If lab work is needed we can place an order for that and you can then stop by our lab to have the test done at a later time.    Phone visits are billed at different rates depending on your insurance coverage.  Please reach out to your insurance provider with any questions.    If during the course of the call the physician/provider feels a phone visit is not appropriate, you will not be charged for this service.\"    Patient has given verbal consent for phone visit? Yes    How would you like to obtain your AVS? Sandro    Yola Amin is a 63 year old year old female patient here today for follow up hx of melanoma in situ , wound healing well.  Patient has no other skin complaints today.  Remainder of the HPI, Meds, PMH, Allergies, FH, and SH was reviewed in chart.      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    CYSTECTOMY OVARIAN BENIGN      left    ENDOCERVICAL CURETTAGE      MAMMOPLASTY REDUCTION BILATERAL Bilateral 03/22/2018    " Procedure: MAMMOPLASTY REDUCTION BILATERAL;  Bilateral Breast Reduction;  Surgeon: AKIRA Vizcarra MD;  Location: [facility] OR        Family History   Problem Relation Age of Onset    Diabetes Mother     Hypertension Mother     Rheumatologic Disease Mother     Obesity Mother     Cancer Father         Prostate cancer    Prostate Cancer Father     Cancer Maternal Grandmother         liver or pancreatic (not sure which)    Liver Disease Maternal Grandmother     Other Cancer Maternal Grandmother         liver cancer    Cerebrovascular Disease Maternal Grandfather     Cancer Paternal Grandmother     Cancer Paternal Grandfather         Prostate cancer    No Known Problems Sister     No Known Problems Sister     Anxiety Disorder Sister     Diabetes Sister     Obesity Sister     Obesity Sister     Melanoma No family hx of     Skin Cancer No family hx of        Social History     Socioeconomic History    Marital status:      Spouse name: Not on file    Number of children: Not on file    Years of education: Not on file    Highest education level: Not on file   Occupational History    Occupation: Industrial organizational psychologist   Tobacco Use    Smoking status: Never    Smokeless tobacco: Never   Substance and Sexual Activity    Alcohol use: Yes     Alcohol/week: 3.0 standard drinks of alcohol     Comment: 6-7 drinks per week    Drug use: No    Sexual activity: Not Currently     Partners: Female     Birth control/protection: None   Other Topics Concern    Parent/sibling w/ CABG, MI or angioplasty before 65F 55M? No   Social History Narrative    Work is going well.  Lives with wife.  Two cats.        Has a good support system.    Feels safe in all environments.    Wears seatbelt 100% of the time    Wears helmet while biking.    Denies history of abuse, past or present, physical, sexual or emotional.    12/21/17    Nancy Kasper PA-C         Social Determinants of Health     Financial Resource Strain: Low Risk   (11/30/2023)    Financial Resource Strain     Within the past 12 months, have you or your family members you live with been unable to get utilities (heat, electricity) when it was really needed?: No   Food Insecurity: Low Risk  (11/30/2023)    Food Insecurity     Within the past 12 months, did you worry that your food would run out before you got money to buy more?: No     Within the past 12 months, did the food you bought just not last and you didn t have money to get more?: No   Transportation Needs: Low Risk  (11/30/2023)    Transportation Needs     Within the past 12 months, has lack of transportation kept you from medical appointments, getting your medicines, non-medical meetings or appointments, work, or from getting things that you need?: No   Physical Activity: Not on file   Stress: Not on file   Social Connections: Not on file   Interpersonal Safety: Not on file   Housing Stability: Low Risk  (11/30/2023)    Housing Stability     Do you have housing? : Yes     Are you worried about losing your housing?: No       Outpatient Encounter Medications as of 4/25/2024   Medication Sig Dispense Refill    escitalopram (LEXAPRO) 20 MG tablet       fexofenadine-pseudoePHEDrine (ALLEGRA-D 24) 180-240 MG per 24 hr tablet Take 1 tablet by mouth daily 14 tablet 1    Glucosamine-Chondroitin (GLUCOSAMINE CHONDR COMPLEX PO) Take 1 capsule by mouth daily       hydrOXYzine HCl (ATARAX) 10 MG tablet TAKE 1 TO 2 TABLETS BY MOUTH UP TO THREE TIMES DAILY AS NEEDED FOR ANXIETY      lisinopril (ZESTRIL) 10 MG tablet Take 1 tablet (10 mg) by mouth daily 90 tablet 1    Multiple Vitamin (MULTIVITAMIN) per tablet Take 1 tablet by mouth daily.      propranolol (INDERAL) 10 MG tablet TAKE 1 TO 2 TABLETS BY MOUTH TWICE DAILY AS NEEDED FOR ANXIETY      simvastatin (ZOCOR) 10 MG tablet Take 1 tablet (10 mg) by mouth at bedtime 90 tablet 1     No facility-administered encounter medications on file as of 4/25/2024.             Review Of  Systems  Skin: As above  Eyes: negative  Ears/Nose/Throat: negative  Respiratory: No shortness of breath, dyspnea on exertion, cough, or hemoptysis  Cardiovascular: negative  Gastrointestinal: negative  Genitourinary: negative  Musculoskeletal: negative  Neurologic: negative  Psychiatric: negative  Hematologic/Lymphatic/Immunologic: negative  Endocrine: negative      O:   Alert & Orientedx3, Mood & Affect wnl,    General appearance normal   Alert, oriented and in no acute distress     Photos:healthy healing wound on shoulder       Pulm: Breathing Normal, talking in normal sentences, no shortness of breath during conversation    Neuro/Psych: Orientation:Alert and Orientedx3 ; Mood/Affect:normal ; no anxiety or depression     A/P:  1.hx of melanoma in situ   Wound healing well   Cont wound care  Return to clinic 1 month  It was a pleasure speaking to Yola Amin today.  Previous clinic  notes and pertinent laboratory tests were reviewed prior to Yola Amin's visit.    Teledermatology information:  - Location of patient: home  - Location of teledermatologist:    - Reason teledermatology is appropriate: of National Emergency Regarding Coronavirus disease (COVID 19) Outbreak  - The patient's condition can safely be assessed using telemedicine: yes  - Method of transmission: store and forward teledermatology  - In-person dermatology visit recommendation: no  - Service start time:930am/pm  - Service end time:954am/pm  - Date of report: 04/25/24

## 2024-06-03 DIAGNOSIS — I10 ESSENTIAL HYPERTENSION: ICD-10-CM

## 2024-06-03 DIAGNOSIS — E78.5 DYSLIPIDEMIA: ICD-10-CM

## 2024-06-04 RX ORDER — SIMVASTATIN 10 MG
10 TABLET ORAL AT BEDTIME
Qty: 90 TABLET | Refills: 1 | Status: SHIPPED | OUTPATIENT
Start: 2024-06-04

## 2024-06-04 RX ORDER — LISINOPRIL 10 MG/1
10 TABLET ORAL DAILY
Qty: 90 TABLET | Refills: 1 | Status: SHIPPED | OUTPATIENT
Start: 2024-06-04

## 2024-08-01 ENCOUNTER — OFFICE VISIT (OUTPATIENT)
Dept: DERMATOLOGY | Facility: CLINIC | Age: 64
End: 2024-08-01
Attending: PHYSICIAN ASSISTANT
Payer: COMMERCIAL

## 2024-08-01 DIAGNOSIS — D18.01 CHERRY ANGIOMA: ICD-10-CM

## 2024-08-01 DIAGNOSIS — Z12.83 ENCOUNTER FOR SCREENING FOR MALIGNANT NEOPLASM OF SKIN: Primary | ICD-10-CM

## 2024-08-01 DIAGNOSIS — L82.1 SEBORRHEIC KERATOSES: ICD-10-CM

## 2024-08-01 DIAGNOSIS — D22.9 MULTIPLE NEVI: ICD-10-CM

## 2024-08-01 DIAGNOSIS — L81.4 LENTIGINES: ICD-10-CM

## 2024-08-01 PROCEDURE — 99213 OFFICE O/P EST LOW 20 MIN: CPT | Performed by: PHYSICIAN ASSISTANT

## 2024-08-01 NOTE — LETTER
8/1/2024      Yola Amin  [address]  [place] [address]      Dear Colleague,    Thank you for referring your patient, Yola Amin, to the [facility]. Please see a copy of my visit note below.    [facility] Dermatology Note  Encounter Date: Aug 1, 2024  Office Visit     Reviewed patients past medical history and pertinent chart review prior to patients visit today.     Dermatology Problem List:  # Melanoma, Breslow depth: 0.5 mm, right upper back, s/p Mohs 2/12/2024   # Melanoma in situ, right temple x1, Mohs 10/27/2022  # ISK - cryo  # Inflamed acrochordon - cryo     NUB, left nasal sidewall, shave biopsy 2/1/2024   NUB, right upper back, shave biopsy 2/1/2024   ____________________________________________    Assessment & Plan:     # Personal history of malignant melanoma  # Multiple nevi, trunk and extremities  # Solar lentigines  - No signs of recurrence. Continued observation recommended.   - Nevi demonstrate no concerning features on dermoscopy. We discussed the importance of self exams at home.   - ABCDEs: Counseled ABCDEs of melanoma: Asymmetry, Border (irregularity), Color (not uniform, changes in color), Diameter (greater than 6 mm which is about the size of a pencil eraser), and Evolving (any changes in preexisting moles).  - Sun protection: Counseled SPF 30+ sunscreen, UPF clothing, sun avoidance, tanning bed avoidance.    # Cherry angiomas  # Seborrheic keratoses  - We discussed the benign nature of the skin lesions. No treatment required. Continued observation recommended. Follow up with any concerns.      Follow-up:  6 months for follow up full body skin exam, as needed for new or changing lesions or new concerns    All risks, benefits and alternatives were discussed with patient.  Patient is in agreement and understands the assessment and plan.  All questions were answered.  Amada Wood PA-C  [facility]  Dermatology    ____________________________________________    CC: Skin Check    HPI:  Ms. Yola Amin is a(n) 64 year old female who presents today as a return patient for a full body skin cancer screening. The patient has a history of malignant melanoma. No specific cutaneous concerns. The patient reports trying to be diligent with photoprotection.      Physical Exam:  Vitals: LMP 02/01/2015   LYMPH: No cervical or axillary lymphadenopathy.   SKIN: Total skin excluding the genitalia areas was performed. The exam included the scalp, face, neck, bilateral arms, chest, back, abdomen, bilateral legs, digits, mons pubis, buttocks, and nails.   - Hernandez II.  - The right upper back and right temple demonstrates a well healed scar with no nodularity, repigmentation, or pain to palpation.   - Multiple tan/brown macules and papules scattered throughout exam, consistent with benign nevi. No concerning features on dermoscopy.   - Scattered tan, homogenous macules scattered on sun exposed skin, consistent with solar lentigines.   - Scattered waxy, stuck on appearing papules and patches, consistent with seborrheic keratoses.  - Several 1-2 mm red dome shaped symmetric papules, consistent with cherry angiomas.     - No other lesions of concern on areas examined.     Medications:  Current Outpatient Medications   Medication Sig Dispense Refill     escitalopram (LEXAPRO) 20 MG tablet        fexofenadine-pseudoePHEDrine (ALLEGRA-D 24) 180-240 MG per 24 hr tablet Take 1 tablet by mouth daily 14 tablet 1     Glucosamine-Chondroitin (GLUCOSAMINE CHONDR COMPLEX PO) Take 1 capsule by mouth daily        hydrOXYzine HCl (ATARAX) 10 MG tablet TAKE 1 TO 2 TABLETS BY MOUTH UP TO THREE TIMES DAILY AS NEEDED FOR ANXIETY       lisinopril (ZESTRIL) 10 MG tablet TAKE 1 TABLET(10 MG) BY MOUTH DAILY 90 tablet 1     Multiple Vitamin (MULTIVITAMIN) per tablet Take 1 tablet by mouth daily.       propranolol (INDERAL) 10 MG tablet TAKE 1 TO 2  TABLETS BY MOUTH TWICE DAILY AS NEEDED FOR ANXIETY       simvastatin (ZOCOR) 10 MG tablet TAKE 1 TABLET(10 MG) BY MOUTH AT BEDTIME 90 tablet 1     No current facility-administered medications for this visit.      Past Medical History:   Patient Active Problem List   Diagnosis     Allergic rhinitis     S/P bilateral breast reduction     S/P Mohs surgery for basal cell carcinoma     Normal hysteroscopy     Past Medical History:   Diagnosis Date     Allergic rhinitis 2013     Hearing problem early 1990s    Hearing loss for high-pitched tones     Malignant melanoma (H)      Reduced vision 2017    visual migraines     Seasonal allergies      Tinnitus as long as I can remember    constant, but ignorable most of the time       CC Amada Wood PA-C  [address]  [address], [facility] [address]  [place] [address] on close of this encounter.      Again, thank you for allowing me to participate in the care of your patient.        Sincerely,        Amada Wood PA-C

## 2024-08-01 NOTE — PATIENT INSTRUCTIONS
Proper skin care from [facility]:    -Eliminate harsh soaps as they strip the natural oils from the skin, often resulting in dry itchy skin ( i.e. Dial, Zest, [place]ese Spring)  -Use mild soaps such as Cetaphil or Dove Sensitive Skin in the shower. You do not need to use soap on arms, legs, and trunk every time you shower unless visibly soiled.   -Avoid hot or cold showers.  -After showering, lightly dry off and apply moisturizing within 2-3 minutes. This will help trap moisture in the skin.   -Aggressive use of a moisturizer at least 1-2 times a day to the entire body (including -Vanicream, Cetaphil, Aquaphor or Cerave) and moisturize hands after every washing.  -We recommend using moisturizers that come in a tub that needs to be scooped out, not a pump. This has more of an oil base. It will hold moisture in your skin much better than a water base moisturizer. The above recommended are non-pore clogging.      Wear a sunscreen with at least SPF 30 on your face, ears, neck and V of the chest daily. Wear sunscreen on other areas of the body if those areas are exposed to the sun throughout the day. Sunscreens can contain physical and/or chemical blockers. Physical blockers are less likely to clog pores, these include zinc oxide and titanium dioxide. Reapply every two hour and after swimming.     Sunscreen examples: https://www.ewg.org/sunscreen/    UV radiation  UVA radiation remains constant throughout the day and throughout the year. It is a longer wavelength than UVB and therefore penetrates deeper into the skin leading to immediate and delayed tanning, photoaging, and skin cancer. 70-80% of UVA and UVB radiation occurs between the hours of 10am-2pm.  UVB radiation  UVB radiation causes the most harmful effects and is more significant during the summer months. However, snow and ice can reflect UVB radiation leading to skin damage during the winter months as well. UVB radiation is responsible for tanning,  burning, inflammation, delayed erythema (pinkness), pigmentation (brown spots), and skin cancer.     I recommend self monthly full body exams and yearly full body exams with a dermatology provider. If you develop a new or changing lesion please follow up for examination. Most skin cancers are pink and scaly or pink and pearly. However, we do see blue/brown/black skin cancers.  Consider the ABCDEs of melanoma when giving yourself your monthly full body exam ( don't forget the groin, buttocks, feet, toes, etc). A-asymmetry, B-borders, C-color, D-diameter, E-elevation or evolving. If you see any of these changes please follow up in clinic. If you cannot see your back I recommend purchasing a hand held mirror to use with a larger wall mirror.       Checking for Skin Cancer  You can find cancer early by checking your skin each month. There are 3 kinds of skin cancer. They are melanoma, basal cell carcinoma, and squamous cell carcinoma. Doing monthly skin checks is the best way to find new marks or skin changes. Follow the instructions below for checking your skin.   The ABCDEs of checking moles for melanoma   Check your moles or growths for signs of melanoma using ABCDE:   Asymmetry: the sides of the mole or growth don t match  Border: the edges are ragged, notched, or blurred  Color: the color within the mole or growth varies  Diameter: the mole or growth is larger than 6 mm (size of a pencil eraser)  Evolving: the size, shape, or color of the mole or growth is changing (evolving is not shown in the images below)    Checking for other types of skin cancer  Basal cell carcinoma or squamous cell carcinoma have symptoms such as:     A spot or mole that looks different from all other marks on your skin  Changes in how an area feels, such as itching, tenderness, or pain  Changes in the skin's surface, such as oozing, bleeding, or scaliness  A sore that does not heal  New swelling or redness beyond the border of a  mole    Who s at risk?  Anyone can get skin cancer. But you are at greater risk if you have:   Fair skin, light-colored hair, or light-colored eyes  Many moles or abnormal moles on your skin  A history of sunburns from sunlight or tanning beds  A family history of skin cancer  A history of exposure to radiation or chemicals  A weakened immune system  If you have had skin cancer in the past, you are at risk for recurring skin cancer.   How to check your skin  Do your monthly skin checkups in front of a full-length mirror. Check all parts of your body, including your:   Head (ears, face, neck, and scalp)  Torso (front, back, and sides)  Arms (tops, undersides, upper, and lower armpits)  Hands (palms, backs, and fingers, including under the nails)  Buttocks and genitals  Legs (front, back, and sides)  Feet (tops, soles, toes, including under the nails, and between toes)  If you have a lot of moles, take digital photos of them each month. Make sure to take photos both up close and from a distance. These can help you see if any moles change over time.   Most skin changes are not cancer. But if you see any changes in your skin, call your doctor right away. Only he or she can diagnose a problem. If you have skin cancer, seeing your doctor can be the first step toward getting the treatment that could save your life.   [employer] last reviewed this educational content on 4/1/2019 2000-2020 [employer]. [address] [place] [address], [place] [address]. All rights reserved. This information is not intended as a substitute for professional medical care. Always follow your healthcare professional's instructions.       When should I call my doctor?  If you are worsening or not improving, please, contact us or seek urgent care as noted below.     Who should I call with questions (adults)?    [facility] [phone]  For urgent needs outside of business hours call the [facility] at  [phone] and ask for the dermatology resident on call to be paged  If this is a medical emergency and you are unable to reach an ER, Call 911      If you need a prescription refill, please contact your pharmacy. Refills are approved or denied by our Physicians during normal business hours, Monday through Fridays  Per office policy, refills will not be granted if you have not been seen within the past year (or sooner depending on your child's condition)

## 2024-08-01 NOTE — PROGRESS NOTES
[facility] Dermatology Note  Encounter Date: Aug 1, 2024  Office Visit     Reviewed patients past medical history and pertinent chart review prior to patients visit today.     Dermatology Problem List:  # Melanoma, Breslow depth: 0.5 mm, right upper back, s/p Mohs 2/12/2024   # Melanoma in situ, right temple x1, Mohs 10/27/2022  # ISK - cryo  # Inflamed acrochordon - cryo     NUB, left nasal sidewall, shave biopsy 2/1/2024   NUB, right upper back, shave biopsy 2/1/2024   ____________________________________________    Assessment & Plan:     # Personal history of malignant melanoma  # Multiple nevi, trunk and extremities  # Solar lentigines  - No signs of recurrence. Continued observation recommended.   - Nevi demonstrate no concerning features on dermoscopy. We discussed the importance of self exams at home.   - ABCDEs: Counseled ABCDEs of melanoma: Asymmetry, Border (irregularity), Color (not uniform, changes in color), Diameter (greater than 6 mm which is about the size of a pencil eraser), and Evolving (any changes in preexisting moles).  - Sun protection: Counseled SPF 30+ sunscreen, UPF clothing, sun avoidance, tanning bed avoidance.    # Cherry angiomas  # Seborrheic keratoses  - We discussed the benign nature of the skin lesions. No treatment required. Continued observation recommended. Follow up with any concerns.      Follow-up:  6 months for follow up full body skin exam, as needed for new or changing lesions or new concerns    All risks, benefits and alternatives were discussed with patient.  Patient is in agreement and understands the assessment and plan.  All questions were answered.  Amada Wood PA-C  [facility]    ____________________________________________    CC: Skin Check    HPI:  Ms. Yola Amin is a(n) 64 year old female who presents today as a return patient for a full body skin cancer screening. The patient has a history of malignant melanoma. No  specific cutaneous concerns. The patient reports trying to be diligent with photoprotection.      Physical Exam:  Vitals: LMP 02/01/2015   LYMPH: No cervical or axillary lymphadenopathy.   SKIN: Total skin excluding the genitalia areas was performed. The exam included the scalp, face, neck, bilateral arms, chest, back, abdomen, bilateral legs, digits, mons pubis, buttocks, and nails.   - Hernandez II.  - The right upper back and right temple demonstrates a well healed scar with no nodularity, repigmentation, or pain to palpation.   - Multiple tan/brown macules and papules scattered throughout exam, consistent with benign nevi. No concerning features on dermoscopy.   - Scattered tan, homogenous macules scattered on sun exposed skin, consistent with solar lentigines.   - Scattered waxy, stuck on appearing papules and patches, consistent with seborrheic keratoses.  - Several 1-2 mm red dome shaped symmetric papules, consistent with cherry angiomas.     - No other lesions of concern on areas examined.     Medications:  Current Outpatient Medications   Medication Sig Dispense Refill    escitalopram (LEXAPRO) 20 MG tablet       fexofenadine-pseudoePHEDrine (ALLEGRA-D 24) 180-240 MG per 24 hr tablet Take 1 tablet by mouth daily 14 tablet 1    Glucosamine-Chondroitin (GLUCOSAMINE CHONDR COMPLEX PO) Take 1 capsule by mouth daily       hydrOXYzine HCl (ATARAX) 10 MG tablet TAKE 1 TO 2 TABLETS BY MOUTH UP TO THREE TIMES DAILY AS NEEDED FOR ANXIETY      lisinopril (ZESTRIL) 10 MG tablet TAKE 1 TABLET(10 MG) BY MOUTH DAILY 90 tablet 1    Multiple Vitamin (MULTIVITAMIN) per tablet Take 1 tablet by mouth daily.      propranolol (INDERAL) 10 MG tablet TAKE 1 TO 2 TABLETS BY MOUTH TWICE DAILY AS NEEDED FOR ANXIETY      simvastatin (ZOCOR) 10 MG tablet TAKE 1 TABLET(10 MG) BY MOUTH AT BEDTIME 90 tablet 1     No current facility-administered medications for this visit.      Past Medical History:   Patient Active Problem List    Diagnosis    Allergic rhinitis    S/P bilateral breast reduction    S/P Mohs surgery for basal cell carcinoma    Normal hysteroscopy     Past Medical History:   Diagnosis Date    Allergic rhinitis 2013    Hearing problem early 1990s    Hearing loss for high-pitched tones    Malignant melanoma (H)     Reduced vision 2017    visual migraines    Seasonal allergies     Tinnitus as long as I can remember    constant, but ignorable most of the time       CC Amada Wood, DESEAN  [address]  [address], [facility] [address]  [place] [address] on close of this encounter.

## 2024-08-18 ENCOUNTER — HEALTH MAINTENANCE LETTER (OUTPATIENT)
Age: 64
End: 2024-08-18

## 2024-10-27 ENCOUNTER — HEALTH MAINTENANCE LETTER (OUTPATIENT)
Age: 64
End: 2024-10-27

## 2024-12-03 DIAGNOSIS — E78.5 DYSLIPIDEMIA: ICD-10-CM

## 2024-12-03 DIAGNOSIS — I10 ESSENTIAL HYPERTENSION: ICD-10-CM

## 2024-12-03 RX ORDER — SIMVASTATIN 10 MG
10 TABLET ORAL AT BEDTIME
Qty: 90 TABLET | Refills: 0 | Status: SHIPPED | OUTPATIENT
Start: 2024-12-03

## 2024-12-03 RX ORDER — LISINOPRIL 10 MG/1
10 TABLET ORAL DAILY
Qty: 90 TABLET | Refills: 0 | Status: SHIPPED | OUTPATIENT
Start: 2024-12-03

## 2025-02-06 ENCOUNTER — OFFICE VISIT (OUTPATIENT)
Dept: DERMATOLOGY | Facility: CLINIC | Age: 65
End: 2025-02-06
Payer: COMMERCIAL

## 2025-02-06 DIAGNOSIS — D18.01 CHERRY ANGIOMA: ICD-10-CM

## 2025-02-06 DIAGNOSIS — L82.1 SEBORRHEIC KERATOSES: ICD-10-CM

## 2025-02-06 DIAGNOSIS — D22.9 MULTIPLE NEVI: ICD-10-CM

## 2025-02-06 DIAGNOSIS — Z12.83 ENCOUNTER FOR SCREENING FOR MALIGNANT NEOPLASM OF SKIN: Primary | ICD-10-CM

## 2025-02-06 DIAGNOSIS — L81.4 LENTIGINES: ICD-10-CM

## 2025-02-06 DIAGNOSIS — Z85.820 HISTORY OF MALIGNANT MELANOMA OF SKIN: ICD-10-CM

## 2025-02-06 RX ORDER — ESCITALOPRAM OXALATE 10 MG/1
TABLET ORAL
COMMUNITY
Start: 2025-02-05

## 2025-02-06 RX ORDER — ESCITALOPRAM OXALATE 5 MG/1
TABLET ORAL
COMMUNITY
Start: 2025-02-05

## 2025-02-06 NOTE — LETTER
2/6/2025      Yola Amin  [address]  [place] [address]      Dear Colleague,    Thank you for referring your patient, Yola Amin, to the [facility]. Please see a copy of my visit note below.    [facility] Note  Encounter Date: Feb 6, 2025  Office Visit     Reviewed patients past medical history and pertinent chart review prior to patients visit today.     Dermatology Problem List:  # Melanoma, Breslow depth: 0.5 mm, right upper back, s/p Mohs 2/12/2024   # Melanoma in situ, right temple x1, Mohs 10/27/2022  # ISK - cryo  # Inflamed acrochordon - cryo  ____________________________________________    Assessment & Plan:     # Personal history of malignant melanoma  # Multiple nevi, trunk and extremities  # Solar lentigines  - No signs of recurrence. Continued observation recommended.   - Nevi demonstrate no concerning features on dermoscopy. We discussed the importance of self exams at home.   - ABCDEs: Counseled ABCDEs of melanoma: Asymmetry, Border (irregularity), Color (not uniform, changes in color), Diameter (greater than 6 mm which is about the size of a pencil eraser), and Evolving (any changes in preexisting moles).  - Sun protection: Counseled SPF 30+ sunscreen, UPF clothing, sun avoidance, tanning bed avoidance.    # Cherry angiomas  # Seborrheic keratoses  - We discussed the benign nature of the skin lesions. No treatment required. Continued observation recommended. Follow up with any concerns.      Follow-up:  6 months for follow up full body skin exam, as needed for new or changing lesions or new concerns    All risks, benefits and alternatives were discussed with patient.  Patient is in agreement and understands the assessment and plan.  All questions were answered.  Amada Wood PA-C  [facility]    ____________________________________________    CC: Skin Check ([facility]/Concerns: none)    HPI:  Ms.  Yola Amin is a(n) 64 year old female who presents today as a return patient for a full body skin cancer screening. The patient has a history of malignant melanoma. No specific cutaneous concerns. The patient reports trying to be diligent with photoprotection.      Physical Exam:  Vitals: LMP 02/01/2015   LYMPH: No cervical or axillary lymphadenopathy.   SKIN: Total skin excluding the genitalia areas was performed. The exam included the scalp, face, neck, bilateral arms, chest, back, abdomen, bilateral legs, digits, mons pubis, buttocks, and nails.   - Hernandez II.  - The right upper back and right temple demonstrates a well healed scar with no nodularity, repigmentation, or pain to palpation.   - Multiple tan/brown macules and papules scattered throughout exam, consistent with benign nevi. No concerning features on dermoscopy.   - Scattered tan, homogenous macules scattered on sun exposed skin, consistent with solar lentigines.   - Scattered waxy, stuck on appearing papules and patches, consistent with seborrheic keratoses.  - Several 1-2 mm red dome shaped symmetric papules, consistent with cherry angiomas.     - No other lesions of concern on areas examined.     Medications:  Current Outpatient Medications   Medication Sig Dispense Refill     escitalopram (LEXAPRO) 10 MG tablet        fexofenadine-pseudoePHEDrine (ALLEGRA-D 24) 180-240 MG per 24 hr tablet Take 1 tablet by mouth daily 14 tablet 1     Glucosamine-Chondroitin (GLUCOSAMINE CHONDR COMPLEX PO) Take 1 capsule by mouth daily        lisinopril (ZESTRIL) 10 MG tablet TAKE 1 TABLET(10 MG) BY MOUTH DAILY 90 tablet 0     Multiple Vitamin (MULTIVITAMIN) per tablet Take 1 tablet by mouth daily.       simvastatin (ZOCOR) 10 MG tablet TAKE 1 TABLET(10 MG) BY MOUTH AT BEDTIME 90 tablet 0     escitalopram (LEXAPRO) 20 MG tablet  (Patient not taking: Reported on 2/6/2025)       escitalopram (LEXAPRO) 5 MG tablet  (Patient not taking: Reported on 2/6/2025)        hydrOXYzine HCl (ATARAX) 10 MG tablet TAKE 1 TO 2 TABLETS BY MOUTH UP TO THREE TIMES DAILY AS NEEDED FOR ANXIETY       propranolol (INDERAL) 10 MG tablet TAKE 1 TO 2 TABLETS BY MOUTH TWICE DAILY AS NEEDED FOR ANXIETY       No current facility-administered medications for this visit.      Past Medical History:   Patient Active Problem List   Diagnosis     Allergic rhinitis     S/P bilateral breast reduction     S/P Mohs surgery for basal cell carcinoma     Normal hysteroscopy     Past Medical History:   Diagnosis Date     Allergic rhinitis 2013     Hearing problem early 1990s    Hearing loss for high-pitched tones     Malignant melanoma (H)      Reduced vision 2017    visual migraines     Seasonal allergies      Tinnitus as long as I can remember    constant, but ignorable most of the time       CC Referred Self, MD  No address on file on close of this encounter.      Again, thank you for allowing me to participate in the care of your patient.        Sincerely,        Amada Wood PA-C    Electronically signed

## 2025-02-06 NOTE — PATIENT INSTRUCTIONS
Proper skin care from [facility]:    -Eliminate harsh soaps as they strip the natural oils from the skin, often resulting in dry itchy skin ( i.e. Dial, Zest, [place] Spring)  -Use mild soaps such as Cetaphil or Dove Sensitive Skin in the shower. You do not need to use soap on arms, legs, and trunk every time you shower unless visibly soiled.   -Avoid hot or cold showers.  -After showering, lightly dry off and apply moisturizing within 2-3 minutes. This will help trap moisture in the skin.   -Aggressive use of a moisturizer at least 1-2 times a day to the entire body (including -Vanicream, Cetaphil, Aquaphor or Cerave) and moisturize hands after every washing.  -We recommend using moisturizers that come in a tub that needs to be scooped out, not a pump. This has more of an oil base. It will hold moisture in your skin much better than a water base moisturizer. The above recommended are non-pore clogging.      Wear a sunscreen with at least SPF 30 on your face, ears, neck and V of the chest daily. Wear sunscreen on other areas of the body if those areas are exposed to the sun throughout the day. Sunscreens can contain physical and/or chemical blockers. Physical blockers are less likely to clog pores, these include zinc oxide and titanium dioxide. Reapply every two hour and after swimming.     Sunscreen examples: https://www.ewg.org/sunscreen/    UV radiation  UVA radiation remains constant throughout the day and throughout the year. It is a longer wavelength than UVB and therefore penetrates deeper into the skin leading to immediate and delayed tanning, photoaging, and skin cancer. 70-80% of UVA and UVB radiation occurs between the hours of 10am-2pm.  UVB radiation  UVB radiation causes the most harmful effects and is more significant during the summer months. However, snow and ice can reflect UVB radiation leading to skin damage during the winter months as well. UVB radiation is responsible for tanning,  burning, inflammation, delayed erythema (pinkness), pigmentation (brown spots), and skin cancer.     I recommend self monthly full body exams and yearly full body exams with a dermatology provider. If you develop a new or changing lesion please follow up for examination. Most skin cancers are pink and scaly or pink and pearly. However, we do see blue/brown/black skin cancers.  Consider the ABCDEs of melanoma when giving yourself your monthly full body exam ( don't forget the groin, buttocks, feet, toes, etc). A-asymmetry, B-borders, C-color, D-diameter, E-elevation or evolving. If you see any of these changes please follow up in clinic. If you cannot see your back I recommend purchasing a hand held mirror to use with a larger wall mirror.       Checking for Skin Cancer  You can find cancer early by checking your skin each month. There are 3 kinds of skin cancer. They are melanoma, basal cell carcinoma, and squamous cell carcinoma. Doing monthly skin checks is the best way to find new marks or skin changes. Follow the instructions below for checking your skin.   The ABCDEs of checking moles for melanoma   Check your moles or growths for signs of melanoma using ABCDE:   Asymmetry: the sides of the mole or growth don t match  Border: the edges are ragged, notched, or blurred  Color: the color within the mole or growth varies  Diameter: the mole or growth is larger than 6 mm (size of a pencil eraser)  Evolving: the size, shape, or color of the mole or growth is changing (evolving is not shown in the images below)    Checking for other types of skin cancer  Basal cell carcinoma or squamous cell carcinoma have symptoms such as:     A spot or mole that looks different from all other marks on your skin  Changes in how an area feels, such as itching, tenderness, or pain  Changes in the skin's surface, such as oozing, bleeding, or scaliness  A sore that does not heal  New swelling or redness beyond the border of a  mole    Who s at risk?  Anyone can get skin cancer. But you are at greater risk if you have:   Fair skin, light-colored hair, or light-colored eyes  Many moles or abnormal moles on your skin  A history of sunburns from sunlight or tanning beds  A family history of skin cancer  A history of exposure to radiation or chemicals  A weakened immune system  If you have had skin cancer in the past, you are at risk for recurring skin cancer.   How to check your skin  Do your monthly skin checkups in front of a full-length mirror. Check all parts of your body, including your:   Head (ears, face, neck, and scalp)  Torso (front, back, and sides)  Arms (tops, undersides, upper, and lower armpits)  Hands (palms, backs, and fingers, including under the nails)  Buttocks and genitals  Legs (front, back, and sides)  Feet (tops, soles, toes, including under the nails, and between toes)  If you have a lot of moles, take digital photos of them each month. Make sure to take photos both up close and from a distance. These can help you see if any moles change over time.   Most skin changes are not cancer. But if you see any changes in your skin, call your doctor right away. Only he or she can diagnose a problem. If you have skin cancer, seeing your doctor can be the first step toward getting the treatment that could save your life.   [employer] last reviewed this educational content on 4/1/2019 2000-2020 [employer]. [address] [place] [address], [place] [address]. All rights reserved. This information is not intended as a substitute for professional medical care. Always follow your healthcare professional's instructions.       When should I call my doctor?  If you are worsening or not improving, please, contact us or seek urgent care as noted below.     Who should I call with questions (adults)?    [facility] [phone]  For urgent needs outside of business hours call the [facility] at  [phone] and ask for the dermatology resident on call to be paged  If this is a medical emergency and you are unable to reach an ER, Call 911      If you need a prescription refill, please contact your pharmacy. Refills are approved or denied by our Physicians during normal business hours, Monday through Friday.  Per office policy, refills will not be granted if you have not been seen within the past year (or sooner depending on the condition).

## 2025-02-06 NOTE — PROGRESS NOTES
[facility] Dermatology Note  Encounter Date: Feb 6, 2025  Office Visit     Reviewed patients past medical history and pertinent chart review prior to patients visit today.     Dermatology Problem List:  # Melanoma, Breslow depth: 0.5 mm, right upper back, s/p Mohs 2/12/2024   # Melanoma in situ, right temple x1, Mohs 10/27/2022  # ISK - cryo  # Inflamed acrochordon - cryo  ____________________________________________    Assessment & Plan:     # Personal history of malignant melanoma  # Multiple nevi, trunk and extremities  # Solar lentigines  - No signs of recurrence. Continued observation recommended.   - Nevi demonstrate no concerning features on dermoscopy. We discussed the importance of self exams at home.   - ABCDEs: Counseled ABCDEs of melanoma: Asymmetry, Border (irregularity), Color (not uniform, changes in color), Diameter (greater than 6 mm which is about the size of a pencil eraser), and Evolving (any changes in preexisting moles).  - Sun protection: Counseled SPF 30+ sunscreen, UPF clothing, sun avoidance, tanning bed avoidance.    # Cherry angiomas  # Seborrheic keratoses  - We discussed the benign nature of the skin lesions. No treatment required. Continued observation recommended. Follow up with any concerns.      Follow-up:  6 months for follow up full body skin exam, as needed for new or changing lesions or new concerns    All risks, benefits and alternatives were discussed with patient.  Patient is in agreement and understands the assessment and plan.  All questions were answered.  Amada Wood PA-C  [facility] Dermatology    ____________________________________________    CC: Skin Check (FBSC/Concerns: none)    HPI:  Ms. Yola Amin is a(n) 64 year old female who presents today as a return patient for a full body skin cancer screening. The patient has a history of malignant melanoma. No specific cutaneous concerns. The patient reports trying to be diligent with  photoprotection.      Physical Exam:  Vitals: LMP 02/01/2015   LYMPH: No cervical or axillary lymphadenopathy.   SKIN: Total skin excluding the genitalia areas was performed. The exam included the scalp, face, neck, bilateral arms, chest, back, abdomen, bilateral legs, digits, mons pubis, buttocks, and nails.   - Hernandez II.  - The right upper back and right temple demonstrates a well healed scar with no nodularity, repigmentation, or pain to palpation.   - Multiple tan/brown macules and papules scattered throughout exam, consistent with benign nevi. No concerning features on dermoscopy.   - Scattered tan, homogenous macules scattered on sun exposed skin, consistent with solar lentigines.   - Scattered waxy, stuck on appearing papules and patches, consistent with seborrheic keratoses.  - Several 1-2 mm red dome shaped symmetric papules, consistent with cherry angiomas.     - No other lesions of concern on areas examined.     Medications:  Current Outpatient Medications   Medication Sig Dispense Refill    escitalopram (LEXAPRO) 10 MG tablet       fexofenadine-pseudoePHEDrine (ALLEGRA-D 24) 180-240 MG per 24 hr tablet Take 1 tablet by mouth daily 14 tablet 1    Glucosamine-Chondroitin (GLUCOSAMINE CHONDR COMPLEX PO) Take 1 capsule by mouth daily       lisinopril (ZESTRIL) 10 MG tablet TAKE 1 TABLET(10 MG) BY MOUTH DAILY 90 tablet 0    Multiple Vitamin (MULTIVITAMIN) per tablet Take 1 tablet by mouth daily.      simvastatin (ZOCOR) 10 MG tablet TAKE 1 TABLET(10 MG) BY MOUTH AT BEDTIME 90 tablet 0    escitalopram (LEXAPRO) 20 MG tablet  (Patient not taking: Reported on 2/6/2025)      escitalopram (LEXAPRO) 5 MG tablet  (Patient not taking: Reported on 2/6/2025)      hydrOXYzine HCl (ATARAX) 10 MG tablet TAKE 1 TO 2 TABLETS BY MOUTH UP TO THREE TIMES DAILY AS NEEDED FOR ANXIETY      propranolol (INDERAL) 10 MG tablet TAKE 1 TO 2 TABLETS BY MOUTH TWICE DAILY AS NEEDED FOR ANXIETY       No current facility-administered  medications for this visit.      Past Medical History:   Patient Active Problem List   Diagnosis    Allergic rhinitis    S/P bilateral breast reduction    S/P Mohs surgery for basal cell carcinoma    Normal hysteroscopy     Past Medical History:   Diagnosis Date    Allergic rhinitis 2013    Hearing problem early 1990s    Hearing loss for high-pitched tones    Malignant melanoma (H)     Reduced vision 2017    visual migraines    Seasonal allergies     Tinnitus as long as I can remember    constant, but ignorable most of the time       CC Referred Self, MD  No address on file on close of this encounter.

## 2025-03-07 PROBLEM — Z98.890 HISTORY OF MOHS MICROGRAPHIC SURGERY FOR SKIN CANCER: Status: ACTIVE | Noted: 2022-12-22

## 2025-03-07 PROBLEM — Z85.828 HISTORY OF MOHS MICROGRAPHIC SURGERY FOR SKIN CANCER: Status: ACTIVE | Noted: 2022-12-22

## 2025-03-09 ENCOUNTER — MYC REFILL (OUTPATIENT)
Dept: FAMILY MEDICINE | Facility: CLINIC | Age: 65
End: 2025-03-09
Payer: COMMERCIAL

## 2025-03-09 DIAGNOSIS — I10 ESSENTIAL HYPERTENSION: ICD-10-CM

## 2025-03-09 RX ORDER — LISINOPRIL 10 MG/1
10 TABLET ORAL DAILY
Qty: 90 TABLET | Refills: 0 | Status: CANCELLED | OUTPATIENT
Start: 2025-03-09

## 2025-03-12 ENCOUNTER — ANCILLARY PROCEDURE (OUTPATIENT)
Dept: MAMMOGRAPHY | Facility: CLINIC | Age: 65
End: 2025-03-12
Attending: INTERNAL MEDICINE
Payer: COMMERCIAL

## 2025-03-12 DIAGNOSIS — Z12.31 VISIT FOR SCREENING MAMMOGRAM: ICD-10-CM

## 2025-03-12 DIAGNOSIS — I10 ESSENTIAL HYPERTENSION: ICD-10-CM

## 2025-03-12 DIAGNOSIS — Z00.00 ROUTINE GENERAL MEDICAL EXAMINATION AT A HEALTH CARE FACILITY: ICD-10-CM

## 2025-03-12 PROCEDURE — 77067 SCR MAMMO BI INCL CAD: CPT | Performed by: RADIOLOGY

## 2025-03-12 PROCEDURE — 77063 BREAST TOMOSYNTHESIS BI: CPT | Performed by: RADIOLOGY

## 2025-03-12 RX ORDER — LISINOPRIL 10 MG/1
10 TABLET ORAL DAILY
Qty: 90 TABLET | Refills: 0 | Status: SHIPPED | OUTPATIENT
Start: 2025-03-12

## 2025-03-24 ENCOUNTER — LAB (OUTPATIENT)
Dept: LAB | Facility: CLINIC | Age: 65
End: 2025-03-24
Payer: COMMERCIAL

## 2025-03-24 DIAGNOSIS — Z23 ENCOUNTER FOR IMMUNIZATION: ICD-10-CM

## 2025-03-24 DIAGNOSIS — I10 ESSENTIAL HYPERTENSION: ICD-10-CM

## 2025-03-24 DIAGNOSIS — F41.1 GENERALIZED ANXIETY DISORDER: ICD-10-CM

## 2025-03-24 DIAGNOSIS — Z98.890 HISTORY OF MOHS MICROGRAPHIC SURGERY FOR SKIN CANCER: ICD-10-CM

## 2025-03-24 DIAGNOSIS — Z85.828 HISTORY OF MOHS MICROGRAPHIC SURGERY FOR SKIN CANCER: ICD-10-CM

## 2025-03-24 DIAGNOSIS — Z00.00 ROUTINE GENERAL MEDICAL EXAMINATION AT A HEALTH CARE FACILITY: ICD-10-CM

## 2025-03-24 DIAGNOSIS — E78.5 DYSLIPIDEMIA: ICD-10-CM

## 2025-03-24 DIAGNOSIS — M85.80 AGE-RELATED BONE LOSS: ICD-10-CM

## 2025-03-24 LAB
ALBUMIN SERPL BCG-MCNC: 4.3 G/DL (ref 3.5–5.2)
ALP SERPL-CCNC: 58 U/L (ref 40–150)
ALT SERPL W P-5'-P-CCNC: 35 U/L (ref 0–50)
ANION GAP SERPL CALCULATED.3IONS-SCNC: 10 MMOL/L (ref 7–15)
AST SERPL W P-5'-P-CCNC: 25 U/L (ref 0–45)
BILIRUB SERPL-MCNC: 0.3 MG/DL
BUN SERPL-MCNC: 15.1 MG/DL (ref 8–23)
CALCIUM SERPL-MCNC: 9.7 MG/DL (ref 8.8–10.4)
CHLORIDE SERPL-SCNC: 102 MMOL/L (ref 98–107)
CHOLEST SERPL-MCNC: 203 MG/DL
CREAT SERPL-MCNC: 0.83 MG/DL (ref 0.51–0.95)
EGFRCR SERPLBLD CKD-EPI 2021: 79 ML/MIN/1.73M2
ERYTHROCYTE [DISTWIDTH] IN BLOOD BY AUTOMATED COUNT: 13.7 % (ref 10–15)
FASTING STATUS PATIENT QL REPORTED: YES
FASTING STATUS PATIENT QL REPORTED: YES
GLUCOSE SERPL-MCNC: 94 MG/DL (ref 70–99)
HCO3 SERPL-SCNC: 28 MMOL/L (ref 22–29)
HCT VFR BLD AUTO: 42.4 % (ref 35–47)
HDLC SERPL-MCNC: 58 MG/DL
HGB BLD-MCNC: 14.1 G/DL (ref 11.7–15.7)
LDLC SERPL CALC-MCNC: 119 MG/DL
MCH RBC QN AUTO: 28 PG (ref 26.5–33)
MCHC RBC AUTO-ENTMCNC: 33.3 G/DL (ref 31.5–36.5)
MCV RBC AUTO: 84 FL (ref 78–100)
NONHDLC SERPL-MCNC: 145 MG/DL
PLATELET # BLD AUTO: 266 10E3/UL (ref 150–450)
POTASSIUM SERPL-SCNC: 4.5 MMOL/L (ref 3.4–5.3)
PROT SERPL-MCNC: 7.4 G/DL (ref 6.4–8.3)
RBC # BLD AUTO: 5.03 10E6/UL (ref 3.8–5.2)
SODIUM SERPL-SCNC: 140 MMOL/L (ref 135–145)
TRIGL SERPL-MCNC: 132 MG/DL
TSH SERPL DL<=0.005 MIU/L-ACNC: 1.39 UIU/ML (ref 0.3–4.2)
VIT D+METAB SERPL-MCNC: 38 NG/ML (ref 20–50)
WBC # BLD AUTO: 8.5 10E3/UL (ref 4–11)

## 2025-03-24 PROCEDURE — 84443 ASSAY THYROID STIM HORMONE: CPT

## 2025-03-24 PROCEDURE — 86735 MUMPS ANTIBODY: CPT

## 2025-03-24 PROCEDURE — 82306 VITAMIN D 25 HYDROXY: CPT

## 2025-03-24 PROCEDURE — 80061 LIPID PANEL: CPT

## 2025-03-24 PROCEDURE — 86762 RUBELLA ANTIBODY: CPT

## 2025-03-24 PROCEDURE — 36415 COLL VENOUS BLD VENIPUNCTURE: CPT

## 2025-03-24 PROCEDURE — 86765 RUBEOLA ANTIBODY: CPT

## 2025-03-24 PROCEDURE — 85027 COMPLETE CBC AUTOMATED: CPT

## 2025-03-24 PROCEDURE — 80053 COMPREHEN METABOLIC PANEL: CPT

## 2025-03-25 LAB
MEV IGG SER IA-ACNC: >300 AU/ML
MEV IGG SER IA-ACNC: POSITIVE
MUMPS ANTIBODY IGG INSTRUMENT VALUE: 183 AU/ML
MUV IGG SER QL IA: POSITIVE
RUBV IGG SERPL QL IA: 23 INDEX
RUBV IGG SERPL QL IA: POSITIVE

## 2025-05-28 ENCOUNTER — VIRTUAL VISIT (OUTPATIENT)
Dept: FAMILY MEDICINE | Facility: CLINIC | Age: 65
End: 2025-05-28
Payer: COMMERCIAL

## 2025-05-28 DIAGNOSIS — E66.813 CLASS 3 OBESITY (H): ICD-10-CM

## 2025-05-28 DIAGNOSIS — D03.39 MELANOMA IN SITU OF OTHER PARTS OF FACE (H): ICD-10-CM

## 2025-05-28 DIAGNOSIS — S40.861A TICK BITE OF RIGHT UPPER ARM, INITIAL ENCOUNTER: Primary | ICD-10-CM

## 2025-05-28 DIAGNOSIS — D03.30 MELANOMA IN SITU OF FACE (H): ICD-10-CM
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
## 2025-05-28 DIAGNOSIS — C43.59 MELANOMA OF BACK (H): ICD-10-CM

## 2025-05-28 PROCEDURE — 98006 SYNCH AUDIO-VIDEO EST MOD 30: CPT | Performed by: INTERNAL MEDICINE

## 2025-05-28 RX ORDER — DOXYCYCLINE HYCLATE 100 MG
100 TABLET ORAL 2 TIMES DAILY
Qty: 14 TABLET | Refills: 0 | Status: SHIPPED | OUTPATIENT
Start: 2025-05-28

## 2025-05-28 NOTE — PROGRESS NOTES
Yola is a 65 year old who is being evaluated via a billable video visit.    How would you like to obtain your AVS? MyChart  If the video visit is dropped, the invitation should be resent by: Text to cell phone: [phone]  Will anyone else be joining your video visit? No        Assessment and Plan  1. Tick bite of right upper arm, initial encounter (Primary)  New problem, patient is here for acute concerns of most likely she could have had Lyme's given the rash headaches and fatigue.  -Video exam showing rounded rash on the right axilla which is erythematous.  Patient states that she noticed rash on the right arm pit 1 week back but this was all preceded by headache.    - Will consider treating this empirically as tick bite given patient's history that she has been trolling mostly in the woods recently, will check Lyme's titer before she starts off on doxycycline with a lab only appointment.  Patient understands the plan  - doxycycline hyclate (VIBRA-TABS) 100 MG tablet; Take 1 tablet (100 mg) by mouth 2 times daily. Do not take within 2 hours of antacids or calcium.  Dispense: 14 tablet; Refill: 0  - LYME DISEASE TOTAL ANTIBODIES WITH REFLEX TO CONFIRMATION; Future    2. Melanoma in situ of other parts of face (H)  3. Melanoma in situ of face (H)  4. Melanoma of back (H)  Chronic problem, no concerns at this time.  Continue to follow dermatology recommendations.    5. Class 3 obesity (H)  Chronic problem, patient working on diet and lifestyle modifications.  Continue the same.       The longitudinal plan of care for the diagnosis(es)/condition(s) as documented were addressed during this visit. Due to the added complexity in care, I will continue to support Yola in the subsequent management and with ongoing continuity of care.      Please note that this note consists of symbols derived from keyboarding, dictation and/or voice recognition software. As a result, there may be errors in the script that have gone  undetected. Please consider this when interpreting information found in this chart.    Patient Instructions   As discussed please make a lab only appointment for checking the Lyme titer and start him on doxycycline course as discussed.    Follow-up with dermatology on the next steps regarding your melanoma concerns if any.      Return in about 9 months (around 3/7/2026), or if symptoms worsen or fail to improve, for Preventative Visit.    Queenie Carcamo MD  [facility] SATYA Aceves is a 65 year old, presenting for the following health issues:  Rash        3/7/2025     1:37 PM   Additional Questions   Roomed by [facility]     History of Present Illness       Reason for visit:  Concern about Lyme disease  Symptoms include:  Rash, headaches, fatigue  Symptom intensity:  Moderate  Symptom progression:  Staying the same  Had these symptoms before:  No  What makes it worse:  No  What makes it better:  No   She is taking medications regularly.        Last seen patient in March 2025 for annual physical at that time, she is here for acute concerns.       No Known Allergies     Past Medical History:   Diagnosis Date    Allergic rhinitis 2013    Hearing problem early 1990s    Hearing loss for high-pitched tones    Malignant melanoma (H)     Reduced vision 2017    visual migraines    Seasonal allergies     Tinnitus as long as I can remember    constant, but ignorable most of the time       Past Surgical History:   Procedure Laterality Date    BIOPSY  October 2022 & Dec 2022    Dec 22: skin from right temple following Moh's procedure; Oct 22: mole on forehead    bone marrow donation  1988    COLONOSCOPY  Spring 2017??    CYSTECTOMY OVARIAN BENIGN      left    ENDOCERVICAL CURETTAGE      MAMMOPLASTY REDUCTION BILATERAL Bilateral 03/22/2018    Procedure: MAMMOPLASTY REDUCTION BILATERAL;  Bilateral Breast Reduction;  Surgeon: AKIRA Vizcarra MD;  Location:  OR       Family History    Problem Relation Age of Onset    Diabetes Mother     Hypertension Mother     Rheumatologic Disease Mother     Obesity Mother     Cancer Father         Prostate cancer    Prostate Cancer Father     Cancer Maternal Grandmother         liver or pancreatic (not sure which)    Liver Disease Maternal Grandmother     Other Cancer Maternal Grandmother         liver cancer    Cerebrovascular Disease Maternal Grandfather     Cancer Paternal Grandmother     Cancer Paternal Grandfather         Prostate cancer    No Known Problems Sister     No Known Problems Sister     Anxiety Disorder Sister     Diabetes Sister     Obesity Sister     Obesity Sister     Melanoma No family hx of     Skin Cancer No family hx of        Social History     Tobacco Use    Smoking status: Never    Smokeless tobacco: Never   Substance Use Topics    Alcohol use: Yes     Alcohol/week: 3.0 standard drinks of alcohol     Comment: 6-7 drinks per week        Current Outpatient Medications   Medication Sig Dispense Refill    doxycycline hyclate (VIBRA-TABS) 100 MG tablet Take 1 tablet (100 mg) by mouth 2 times daily. Do not take within 2 hours of antacids or calcium. 14 tablet 0    escitalopram (LEXAPRO) 10 MG tablet       fexofenadine-pseudoePHEDrine (ALLEGRA-D 24) 180-240 MG per 24 hr tablet Take 1 tablet by mouth daily 14 tablet 1    Glucosamine-Chondroitin (GLUCOSAMINE CHONDR COMPLEX PO) Take 1 capsule by mouth daily       lisinopril (ZESTRIL) 10 MG tablet Take 1 tablet (10 mg) by mouth daily. 90 tablet 0    Multiple Vitamin (MULTIVITAMIN) per tablet Take 1 tablet by mouth daily.      simvastatin (ZOCOR) 20 MG tablet Take 1 tablet (20 mg) by mouth at bedtime. 90 tablet 3     No current facility-administered medications for this visit.          Review of Systems  Constitutional, HEENT, cardiovascular, pulmonary, GI, , musculoskeletal, neuro, skin, endocrine and psych systems are negative, except as otherwise noted.      Objective           Vitals:  No  vitals were obtained today due to virtual visit.    Physical Exam   GENERAL: alert and no distress  EYES: Eyes grossly normal to inspection.  No discharge or erythema, or obvious scleral/conjunctival abnormalities.  RESP: No audible wheeze, cough, or visible cyanosis.    SKIN: Visible skin clear. No significant rash, abnormal pigmentation or lesions.  NEURO: Cranial nerves grossly intact.  Mentation and speech appropriate for age.  PSYCH: Appropriate affect, tone, and pace of words      Video-Visit Details    Type of service:  Video Visit   Originating Location (pt. Location): Home    Distant Location (provider location):  On-site  Platform used for Video Visit: Terrell  Signed Electronically by: Queenie Carcamo MD

## 2025-05-28 NOTE — PATIENT INSTRUCTIONS
As discussed please make a lab only appointment for checking the Lyme titer and start him on doxycycline course as discussed.    Follow-up with dermatology on the next steps regarding your melanoma concerns if any.

## 2025-06-08 DIAGNOSIS — I10 ESSENTIAL HYPERTENSION: ICD-10-CM

## 2025-06-09 ENCOUNTER — RESULTS FOLLOW-UP (OUTPATIENT)
Dept: FAMILY MEDICINE | Facility: CLINIC | Age: 65
End: 2025-06-09

## 2025-06-09 ENCOUNTER — MYC MEDICAL ADVICE (OUTPATIENT)
Dept: FAMILY MEDICINE | Facility: CLINIC | Age: 65
End: 2025-06-09
Payer: COMMERCIAL

## 2025-06-09 RX ORDER — LISINOPRIL 10 MG/1
10 TABLET ORAL DAILY
Qty: 90 TABLET | Refills: 0 | Status: SHIPPED | OUTPATIENT
Start: 2025-06-09

## 2025-06-09 NOTE — TELEPHONE ENCOUNTER
Duplicate request. Please see Refill encounter from 6/8/2025.     Nora Stringer RN    Lab did not release original order

## 2025-06-09 NOTE — TELEPHONE ENCOUNTER
Pt Sent [employer] 6/9/2025 requesting refill of lisinopril. Please refill if appropriate.     Nora Stringer RN

## 2025-06-09 NOTE — TELEPHONE ENCOUNTER
Prescription approved per [facility] Refill Protocol.  Denisse Agrawal RN  [facility]

## 2025-07-07 NOTE — PROGRESS NOTES
[facility] Dermatology Note  Encounter Date: Jul 10, 2025  Office Visit     Reviewed patient's past medical history and pertinent chart review prior to patient's visit today.     Dermatology Problem List:  # NUB, right posterior shoulder, shave biopsy 7/10/2025     # Melanoma, Breslow depth: 0.5 mm, right upper back, s/p Mohs 2/12/2024   # Melanoma in situ, right temple x1, Mohs 10/27/2022  # ISK - cryo  # Inflamed acrochordon - cryo  # Intertrigo  - hydrocortisone 2.5% cream, ketoconazole 2% cream, OTC zeasorb powder    ____________________________________________    Assessment & Plan:     # Neoplasm of uncertain behavior:  right posterior shoulder  DDx includes nevus vs atypical nevus vs melanoma. Shave biopsy today.    Procedure Note: Biopsy by shave technique  The risks and benefits of the procedure were described to the patient. These include but are not limited to bleeding, infection, scar, incomplete removal, and non-diagnostic biopsy. Verbal informed consent was obtained. The above site(s) was cleansed with an alcohol pad and injected with 1% lidocaine with epinephrine. Once anesthesia was obtained, a biopsy(ies) was performed with Gilette blade. The tissue(s) was placed in a labeled container(s) with formalin and sent to pathology. Hemostasis was achieved with aluminum chloride. Vaseline and a bandage were applied to the wound(s). The patient tolerated the procedure well and was given post biopsy care instructions.    # Intertrigo  - No fluorescence with Wood's lamp   - I prescribed hydrocortisone 2.5% cream and ketoconazole 2% cream to be mixed in a 1:1 ratio and applied twice daily for up to two weeks for flares.  We discussed the importance of keeping areas dry.  I recommended over the counter anti-fungal powders daily for maintenance, such as Zeasorb excess moisture.     # Personal history of malignant melanoma  # Multiple nevi, trunk and extremities  # Solar lentigines  - No signs  of recurrence. Continued observation recommended.   - Nevi demonstrate no concerning features on dermoscopy. We discussed the importance of self exams at home.   - ABCDEs: Counseled ABCDEs of melanoma: Asymmetry, Border (irregularity), Color (not uniform, changes in color), Diameter (greater than 6 mm which is about the size of a pencil eraser), and Evolving (any changes in preexisting moles).  - Sun protection: Counseled SPF 30+ sunscreen, UPF clothing, sun avoidance, tanning bed avoidance.    # Cherry angiomas  # Seborrheic keratoses  - We discussed the benign nature of the skin lesions. No treatment required. Continued observation recommended. Follow up with any concerns.      Follow-up:  6 months for follow up full body skin exam, as needed for new or changing lesions or new concerns    All risks, benefits and alternatives were discussed with patient.  Patient is in agreement and understands the assessment and plan.  All questions were answered.  Amada Wood PA-C  [facility] Dermatology    ____________________________________________    CC: Skin Check (1.  FBSC/2.  Rash under R arm over the past month)    HPI:  Ms. Yola Amin is a(n) 65 year old female who presents today as a return patient for a full body skin cancer screening. The patient has a history of malignant melanoma. Today, the patient reports redness involving the right underarm.  This has been ongoing for 1 month.  The intensity of the redness comes and goes.  She notes occasional itching.  No new deodorants.  No prior history of similar rash.  No history of eczema psoriasis.  No other specific cutaneous concerns. The patient reports trying to be diligent with photoprotection.      Physical Exam:  Vitals: LMP 02/01/2015   LYMPH: No cervical or axillary lymphadenopathy.   SKIN: Total skin excluding the genitalia areas was performed. The exam included the scalp, face, neck, bilateral arms, chest, back, abdomen, bilateral legs, digits,  mons pubis, buttocks, and nails.   - Hernandez II.  - Involving the right posterior shoulder is a 0.4 x 0.4 cm tan patch with gray granules.   - Right axilla demonstrates a moist, pink, well demarcated patch, no fluorescence with Wood's lamp, consistent with intertrigo.  - The right temple and right upper back demonstrates a well healed scar with no nodularity, repigmentation, or pain to palpation.   - Multiple tan/brown macules and papules scattered throughout exam, consistent with benign nevi. No concerning features on dermoscopy.   - Scattered tan, homogenous macules scattered on sun exposed skin, consistent with solar lentigines.   - Scattered waxy, stuck on appearing papules and patches, consistent with seborrheic keratoses.  - Several 1-2 mm red dome shaped symmetric papules, consistent with cherry angiomas.     - No other lesions of concern on areas examined.     Medications:  Current Outpatient Medications   Medication Sig Dispense Refill    fexofenadine-pseudoePHEDrine (ALLEGRA-D 24) 180-240 MG per 24 hr tablet Take 1 tablet by mouth daily 14 tablet 1    Glucosamine-Chondroitin (GLUCOSAMINE CHONDR COMPLEX PO) Take 1 capsule by mouth daily       lisinopril (ZESTRIL) 10 MG tablet TAKE 1 TABLET(10 MG) BY MOUTH DAILY 90 tablet 0    Multiple Vitamin (MULTIVITAMIN) per tablet Take 1 tablet by mouth daily.      simvastatin (ZOCOR) 20 MG tablet Take 1 tablet (20 mg) by mouth at bedtime. 90 tablet 3    doxycycline hyclate (VIBRA-TABS) 100 MG tablet Take 1 tablet (100 mg) by mouth 2 times daily. Do not take within 2 hours of antacids or calcium. 14 tablet 0    escitalopram (LEXAPRO) 10 MG tablet        No current facility-administered medications for this visit.      Past Medical History:   Patient Active Problem List   Diagnosis    Allergic rhinitis    S/P bilateral breast reduction    History of Mohs micrographic surgery for skin cancer    Normal hysteroscopy    Melanoma in situ of face (H)    Melanoma of back  (H)    Melanoma in situ of other parts of face (H)    Class 3 obesity (H)     Past Medical History:   Diagnosis Date    Allergic rhinitis 2013    Hearing problem early 1990s    Hearing loss for high-pitched tones    Malignant melanoma (H)     Reduced vision 2017    visual migraines    Seasonal allergies     Tinnitus as long as I can remember    constant, but ignorable most of the time       CC Referred Self, MD  No address on file on close of this encounter.

## 2025-07-10 ENCOUNTER — OFFICE VISIT (OUTPATIENT)
Dept: DERMATOLOGY | Facility: CLINIC | Age: 65
End: 2025-07-10
Payer: COMMERCIAL

## 2025-07-10 DIAGNOSIS — D49.2 NEOPLASM OF UNSPECIFIED BEHAVIOR OF BONE, SOFT TISSUE, AND SKIN: ICD-10-CM

## 2025-07-10 DIAGNOSIS — L30.4 INTERTRIGO: ICD-10-CM

## 2025-07-10 DIAGNOSIS — D18.01 CHERRY ANGIOMA: ICD-10-CM

## 2025-07-10 DIAGNOSIS — Z12.83 ENCOUNTER FOR SCREENING FOR MALIGNANT NEOPLASM OF SKIN: Primary | ICD-10-CM

## 2025-07-10 DIAGNOSIS — L82.1 SEBORRHEIC KERATOSES: ICD-10-CM

## 2025-07-10 DIAGNOSIS — D22.9 MULTIPLE NEVI: ICD-10-CM

## 2025-07-10 DIAGNOSIS — L81.4 LENTIGINES: ICD-10-CM

## 2025-07-10 DIAGNOSIS — Z85.820 HISTORY OF MALIGNANT MELANOMA OF SKIN: ICD-10-CM

## 2025-07-10 RX ORDER — HYDROCORTISONE 25 MG/G
CREAM TOPICAL 2 TIMES DAILY PRN
Qty: 30 G | Refills: 3 | Status: SHIPPED | OUTPATIENT
Start: 2025-07-10

## 2025-07-10 RX ORDER — KETOCONAZOLE 20 MG/G
CREAM TOPICAL 2 TIMES DAILY PRN
Qty: 30 G | Refills: 3 | Status: SHIPPED | OUTPATIENT
Start: 2025-07-10

## 2025-07-10 NOTE — LETTER
7/10/2025      Yola Amin  [address]  [place] [address]      Dear Colleague,    Thank you for referring your patient, Yola Amin, to the [facility]. Please see a copy of my visit note below.    [facility] D[facility] Note  Encounter Date: Jul 10, 2025  Office Visit     Reviewed patient's past medical history and pertinent chart review prior to patient's visit today.     Dermatology Problem List:  # NUB, right posterior shoulder, shave biopsy 7/10/2025     # Melanoma, Breslow depth: 0.5 mm, right upper back, s/p Mohs 2/12/2024   # Melanoma in situ, right temple x1, Mohs 10/27/2022  # ISK - cryo  # Inflamed acrochordon - cryo  # Intertrigo  - hydrocortisone 2.5% cream, ketoconazole 2% cream, OTC zeasorb powder    ____________________________________________    Assessment & Plan:     # Neoplasm of uncertain behavior:  right posterior shoulder  DDx includes nevus vs atypical nevus vs melanoma. Shave biopsy today.    Procedure Note: Biopsy by shave technique  The risks and benefits of the procedure were described to the patient. These include but are not limited to bleeding, infection, scar, incomplete removal, and non-diagnostic biopsy. Verbal informed consent was obtained. The above site(s) was cleansed with an alcohol pad and injected with 1% lidocaine with epinephrine. Once anesthesia was obtained, a biopsy(ies) was performed with Gilette blade. The tissue(s) was placed in a labeled container(s) with formalin and sent to pathology. Hemostasis was achieved with aluminum chloride. Vaseline and a bandage were applied to the wound(s). The patient tolerated the procedure well and was given post biopsy care instructions.    # Intertrigo  - No fluorescence with Wood's lamp   - I prescribed hydrocortisone 2.5% cream and ketoconazole 2% cream to be mixed in a 1:1 ratio and applied twice daily for up to two weeks for flares.  We discussed the importance of  keeping areas dry.  I recommended over the counter anti-fungal powders daily for maintenance, such as Zeasorb excess moisture.     # Personal history of malignant melanoma  # Multiple nevi, trunk and extremities  # Solar lentigines  - No signs of recurrence. Continued observation recommended.   - Nevi demonstrate no concerning features on dermoscopy. We discussed the importance of self exams at home.   - ABCDEs: Counseled ABCDEs of melanoma: Asymmetry, Border (irregularity), Color (not uniform, changes in color), Diameter (greater than 6 mm which is about the size of a pencil eraser), and Evolving (any changes in preexisting moles).  - Sun protection: Counseled SPF 30+ sunscreen, UPF clothing, sun avoidance, tanning bed avoidance.    # Cherry angiomas  # Seborrheic keratoses  - We discussed the benign nature of the skin lesions. No treatment required. Continued observation recommended. Follow up with any concerns.      Follow-up:  6 months for follow up full body skin exam, as needed for new or changing lesions or new concerns    All risks, benefits and alternatives were discussed with patient.  Patient is in agreement and understands the assessment and plan.  All questions were answered.  Amada Wood PA-C  [facility]    ____________________________________________    CC: Skin Check (1.  FBSC/2.  Rash under R arm over the past month)    HPI:  Ms. Yola Amin is a(n) 65 year old female who presents today as a return patient for a full body skin cancer screening. The patient has a history of malignant melanoma. Today, the patient reports redness involving the right underarm.  This has been ongoing for 1 month.  The intensity of the redness comes and goes.  She notes occasional itching.  No new deodorants.  No prior history of similar rash.  No history of eczema psoriasis.  No other specific cutaneous concerns. The patient reports trying to be diligent with photoprotection.      Physical  Exam:  Vitals: LMP 02/01/2015   LYMPH: No cervical or axillary lymphadenopathy.   SKIN: Total skin excluding the genitalia areas was performed. The exam included the scalp, face, neck, bilateral arms, chest, back, abdomen, bilateral legs, digits, mons pubis, buttocks, and nails.   - Hernandez II.  - Involving the right posterior shoulder is a 0.4 x 0.4 cm tan patch with gray granules.   - Right axilla demonstrates a moist, pink, well demarcated patch, no fluorescence with Wood's lamp, consistent with intertrigo.  - The right temple and right upper back demonstrates a well healed scar with no nodularity, repigmentation, or pain to palpation.   - Multiple tan/brown macules and papules scattered throughout exam, consistent with benign nevi. No concerning features on dermoscopy.   - Scattered tan, homogenous macules scattered on sun exposed skin, consistent with solar lentigines.   - Scattered waxy, stuck on appearing papules and patches, consistent with seborrheic keratoses.  - Several 1-2 mm red dome shaped symmetric papules, consistent with cherry angiomas.     - No other lesions of concern on areas examined.     Medications:  Current Outpatient Medications   Medication Sig Dispense Refill     fexofenadine-pseudoePHEDrine (ALLEGRA-D 24) 180-240 MG per 24 hr tablet Take 1 tablet by mouth daily 14 tablet 1     Glucosamine-Chondroitin (GLUCOSAMINE CHONDR COMPLEX PO) Take 1 capsule by mouth daily        lisinopril (ZESTRIL) 10 MG tablet TAKE 1 TABLET(10 MG) BY MOUTH DAILY 90 tablet 0     Multiple Vitamin (MULTIVITAMIN) per tablet Take 1 tablet by mouth daily.       simvastatin (ZOCOR) 20 MG tablet Take 1 tablet (20 mg) by mouth at bedtime. 90 tablet 3     doxycycline hyclate (VIBRA-TABS) 100 MG tablet Take 1 tablet (100 mg) by mouth 2 times daily. Do not take within 2 hours of antacids or calcium. 14 tablet 0     escitalopram (LEXAPRO) 10 MG tablet        No current facility-administered medications for this visit.       Past Medical History:   Patient Active Problem List   Diagnosis     Allergic rhinitis     S/P bilateral breast reduction     History of Mohs micrographic surgery for skin cancer     Normal hysteroscopy     Melanoma in situ of face (H)     Melanoma of back (H)     Melanoma in situ of other parts of face (H)     Class 3 obesity (H)     Past Medical History:   Diagnosis Date     Allergic rhinitis 2013     Hearing problem early 1990s    Hearing loss for high-pitched tones     Malignant melanoma (H)      Reduced vision 2017    visual migraines     Seasonal allergies      Tinnitus as long as I can remember    constant, but ignorable most of the time       CC Referred Self, MD  No address on file on close of this encounter.    Again, thank you for allowing me to participate in the care of your patient.        Sincerely,        Amada Wood PA-C    Electronically signed

## 2025-07-10 NOTE — PATIENT INSTRUCTIONS
Proper skin care from [facility]:    -Eliminate harsh soaps as they strip the natural oils from the skin, often resulting in dry itchy skin ( i.e. Dial, Zest, [place] Spring)  -Use mild soaps such as Cetaphil or Dove Sensitive Skin in the shower. You do not need to use soap on arms, legs, and trunk every time you shower unless visibly soiled.   -Avoid hot or cold showers.  -After showering, lightly dry off and apply moisturizing within 2-3 minutes. This will help trap moisture in the skin.   -Aggressive use of a moisturizer at least 1-2 times a day to the entire body (including -Vanicream, Cetaphil, Aquaphor or Cerave) and moisturize hands after every washing.  -We recommend using moisturizers that come in a tub that needs to be scooped out, not a pump. This has more of an oil base. It will hold moisture in your skin much better than a water base moisturizer. The above recommended are non-pore clogging.      Wear a sunscreen with at least SPF 30 on your face, ears, neck and V of the chest daily. Wear sunscreen on other areas of the body if those areas are exposed to the sun throughout the day. Sunscreens can contain physical and/or chemical blockers. Physical blockers are less likely to clog pores, these include zinc oxide and titanium dioxide. Reapply every two hour and after swimming.     Sunscreen examples: https://www.ewg.org/sunscreen/    UV radiation  UVA radiation remains constant throughout the day and throughout the year. It is a longer wavelength than UVB and therefore penetrates deeper into the skin leading to immediate and delayed tanning, photoaging, and skin cancer. 70-80% of UVA and UVB radiation occurs between the hours of 10am-2pm.  UVB radiation  UVB radiation causes the most harmful effects and is more significant during the summer months. However, snow and ice can reflect UVB radiation leading to skin damage during the winter months as well. UVB radiation is responsible for tanning,  burning, inflammation, delayed erythema (pinkness), pigmentation (brown spots), and skin cancer.     I recommend self monthly full body exams and yearly full body exams with a dermatology provider. If you develop a new or changing lesion please follow up for examination. Most skin cancers are pink and scaly or pink and pearly. However, we do see blue/brown/black skin cancers.  Consider the ABCDEs of melanoma when giving yourself your monthly full body exam ( don't forget the groin, buttocks, feet, toes, etc). A-asymmetry, B-borders, C-color, D-diameter, E-elevation or evolving. If you see any of these changes please follow up in clinic. If you cannot see your back I recommend purchasing a hand held mirror to use with a larger wall mirror.       Checking for Skin Cancer  You can find cancer early by checking your skin each month. There are 3 kinds of skin cancer. They are melanoma, basal cell carcinoma, and squamous cell carcinoma. Doing monthly skin checks is the best way to find new marks or skin changes. Follow the instructions below for checking your skin.   The ABCDEs of checking moles for melanoma   Check your moles or growths for signs of melanoma using ABCDE:   Asymmetry: the sides of the mole or growth don t match  Border: the edges are ragged, notched, or blurred  Color: the color within the mole or growth varies  Diameter: the mole or growth is larger than 6 mm (size of a pencil eraser)  Evolving: the size, shape, or color of the mole or growth is changing (evolving is not shown in the images below)    Checking for other types of skin cancer  Basal cell carcinoma or squamous cell carcinoma have symptoms such as:     A spot or mole that looks different from all other marks on your skin  Changes in how an area feels, such as itching, tenderness, or pain  Changes in the skin's surface, such as oozing, bleeding, or scaliness  A sore that does not heal  New swelling or redness beyond the border of a  mole    Who s at risk?  Anyone can get skin cancer. But you are at greater risk if you have:   Fair skin, light-colored hair, or light-colored eyes  Many moles or abnormal moles on your skin  A history of sunburns from sunlight or tanning beds  A family history of skin cancer  A history of exposure to radiation or chemicals  A weakened immune system  If you have had skin cancer in the past, you are at risk for recurring skin cancer.   How to check your skin  Do your monthly skin checkups in front of a full-length mirror. Check all parts of your body, including your:   Head (ears, face, neck, and scalp)  Torso (front, back, and sides)  Arms (tops, undersides, upper, and lower armpits)  Hands (palms, backs, and fingers, including under the nails)  Buttocks and genitals  Legs (front, back, and sides)  Feet (tops, soles, toes, including under the nails, and between toes)  If you have a lot of moles, take digital photos of them each month. Make sure to take photos both up close and from a distance. These can help you see if any moles change over time.   Most skin changes are not cancer. But if you see any changes in your skin, call your doctor right away. Only he or she can diagnose a problem. If you have skin cancer, seeing your doctor can be the first step toward getting the treatment that could save your life.   [employer] last reviewed this educational content on 4/1/2019 2000-2020 [employer]. [address] [place] [address], [place] [address]. All rights reserved. This information is not intended as a substitute for professional medical care. Always follow your healthcare professional's instructions.       When should I call my doctor?  If you are worsening or not improving, please, contact us or seek urgent care as noted below.     Who should I call with questions (adults)?    [facility] [phone]  For urgent needs outside of business hours call the [facility] at  [phone] and ask for the dermatology resident on call to be paged  If this is a medical emergency and you are unable to reach an ER, Call [phone]      If you need a prescription refill, please contact your pharmacy. Refills are approved or denied by our Physicians during normal business hours, Monday through Friday.  Per office policy, refills will not be granted if you have not been seen within the past year (or sooner depending on the condition).

## 2025-07-20 LAB
PATH REPORT.COMMENTS IMP SPEC: NORMAL
PATH REPORT.COMMENTS IMP SPEC: NORMAL
PATH REPORT.FINAL DX SPEC: NORMAL
PATH REPORT.GROSS SPEC: NORMAL
PATH REPORT.MICROSCOPIC SPEC OTHER STN: NORMAL
PATH REPORT.RELEVANT HX SPEC: NORMAL

## (undated) DEVICE — GLOVE PROTEXIS W/NEU-THERA 7.0  2D73TE70

## (undated) DEVICE — LINEN TOWEL PACK X5 5464

## (undated) DEVICE — PREP CHLORAPREP 26ML TINTED ORANGE  260815

## (undated) DEVICE — SU MONOCRYL 3-0 PS-1 27" Y936H

## (undated) DEVICE — STPL SKIN 35W 059037

## (undated) DEVICE — SU SILK 2-0 TIE 12X30" A305H

## (undated) DEVICE — PACK MINOR CUSTOM ASC

## (undated) DEVICE — LINEN TOWEL PACK X6 WHITE 5487

## (undated) DEVICE — DRSG KERLIX 4 1/2"X4YDS ROLL 6730

## (undated) DEVICE — BLADE KNIFE SURG 10 371110

## (undated) DEVICE — DRAPE U SPLIT 74X120" 29440

## (undated) DEVICE — PAD ARMBOARD FOAM EGGCRATE 50676-378

## (undated) DEVICE — SYR BULB IRRIG 50ML LATEX FREE 0035280

## (undated) DEVICE — DRAPE IOBAN INCISE 23X17" 6650EZ

## (undated) DEVICE — PAD CHUX UNDERPAD 30X30"

## (undated) DEVICE — SPONGE LAP 18X18" X8435

## (undated) DEVICE — BNDG ELASTIC 6" DBL LENGTH UNSTERILE 6611-16

## (undated) DEVICE — SOL NACL 0.9% IRRIG 1000ML BOTTLE 2F7124

## (undated) DEVICE — ESU PENCIL SMOKE EVAC W/ROCKER SWITCH 0703-047-000

## (undated) DEVICE — ESU GROUND PAD ADULT W/CORD E7507

## (undated) DEVICE — Device

## (undated) DEVICE — SU MONOCRYL 2-0 SH 27" UND Y417H

## (undated) DEVICE — SOL WATER IRRIG 500ML BOTTLE 2F7113

## (undated) DEVICE — SUCTION MANIFOLD NEPTUNE 2 SYS 1 PORT 702-025-000

## (undated) DEVICE — GOWN IMPERVIOUS LEVEL 4 BLUE

## (undated) DEVICE — SU PLAIN FAST ABSORB 5-0 PC-1 18" 1915G

## (undated) DEVICE — ESU ELEC BLADE HEX-LOCKING 2.5" E1450X

## (undated) DEVICE — DRSG ABDOMINAL 07 1/2X8" 7197D

## (undated) DEVICE — SUCTION TIP YANKAUER W/O VENT K86

## (undated) RX ORDER — LIDOCAINE HYDROCHLORIDE AND EPINEPHRINE 10; 10 MG/ML; UG/ML
INJECTION, SOLUTION INFILTRATION; PERINEURAL
Status: DISPENSED
Start: 2022-12-21

## (undated) RX ORDER — PROPOFOL 10 MG/ML
INJECTION, EMULSION INTRAVENOUS
Status: DISPENSED
Start: 2018-03-22

## (undated) RX ORDER — ONDANSETRON 2 MG/ML
INJECTION INTRAMUSCULAR; INTRAVENOUS
Status: DISPENSED
Start: 2018-03-22

## (undated) RX ORDER — PHENYLEPHRINE HCL IN 0.9% NACL 1 MG/10 ML
SYRINGE (ML) INTRAVENOUS
Status: DISPENSED
Start: 2018-03-22

## (undated) RX ORDER — ACETAMINOPHEN 325 MG/1
TABLET ORAL
Status: DISPENSED
Start: 2018-03-22

## (undated) RX ORDER — GABAPENTIN 300 MG/1
CAPSULE ORAL
Status: DISPENSED
Start: 2018-03-22

## (undated) RX ORDER — FENTANYL CITRATE 50 UG/ML
INJECTION, SOLUTION INTRAMUSCULAR; INTRAVENOUS
Status: DISPENSED
Start: 2018-03-22

## (undated) RX ORDER — HYDROMORPHONE HYDROCHLORIDE 1 MG/ML
INJECTION, SOLUTION INTRAMUSCULAR; INTRAVENOUS; SUBCUTANEOUS
Status: DISPENSED
Start: 2018-03-22

## (undated) RX ORDER — EPHEDRINE SULFATE 50 MG/ML
INJECTION, SOLUTION INTRAMUSCULAR; INTRAVENOUS; SUBCUTANEOUS
Status: DISPENSED
Start: 2018-03-22

## (undated) RX ORDER — GLYCOPYRROLATE 0.2 MG/ML
INJECTION INTRAMUSCULAR; INTRAVENOUS
Status: DISPENSED
Start: 2018-03-22

## (undated) RX ORDER — CEFAZOLIN SODIUM 1 G/3ML
INJECTION, POWDER, FOR SOLUTION INTRAMUSCULAR; INTRAVENOUS
Status: DISPENSED
Start: 2018-03-22

## (undated) RX ORDER — DEXAMETHASONE SODIUM PHOSPHATE 4 MG/ML
INJECTION, SOLUTION INTRA-ARTICULAR; INTRALESIONAL; INTRAMUSCULAR; INTRAVENOUS; SOFT TISSUE
Status: DISPENSED
Start: 2018-03-22

## (undated) RX ORDER — OXYCODONE HYDROCHLORIDE 5 MG/1
TABLET ORAL
Status: DISPENSED
Start: 2018-03-22